# Patient Record
Sex: MALE | Race: BLACK OR AFRICAN AMERICAN | Employment: OTHER | URBAN - NONMETROPOLITAN AREA
[De-identification: names, ages, dates, MRNs, and addresses within clinical notes are randomized per-mention and may not be internally consistent; named-entity substitution may affect disease eponyms.]

---

## 2018-06-12 ENCOUNTER — HOSPITAL ENCOUNTER (INPATIENT)
Age: 63
LOS: 7 days | Discharge: NURSING, REHAB OR LONG-TERM CARE | DRG: 071 | End: 2018-06-19
Attending: FAMILY MEDICINE | Admitting: INTERNAL MEDICINE
Payer: COMMERCIAL

## 2018-06-12 ENCOUNTER — APPOINTMENT (OUTPATIENT)
Dept: CT IMAGING | Age: 63
DRG: 071 | End: 2018-06-12
Payer: COMMERCIAL

## 2018-06-12 ENCOUNTER — APPOINTMENT (OUTPATIENT)
Dept: ULTRASOUND IMAGING | Age: 63
DRG: 071 | End: 2018-06-12
Payer: COMMERCIAL

## 2018-06-12 ENCOUNTER — APPOINTMENT (OUTPATIENT)
Dept: GENERAL RADIOLOGY | Age: 63
DRG: 071 | End: 2018-06-12
Payer: COMMERCIAL

## 2018-06-12 DIAGNOSIS — E87.6 HYPOKALEMIA: ICD-10-CM

## 2018-06-12 DIAGNOSIS — E87.1 HYPONATREMIA: ICD-10-CM

## 2018-06-12 DIAGNOSIS — R41.82 ALTERED MENTAL STATUS, UNSPECIFIED ALTERED MENTAL STATUS TYPE: Primary | ICD-10-CM

## 2018-06-12 DIAGNOSIS — Q04.0: ICD-10-CM

## 2018-06-12 DIAGNOSIS — R53.81 PHYSICAL DEBILITY: ICD-10-CM

## 2018-06-12 DIAGNOSIS — E83.42 HYPOMAGNESEMIA: ICD-10-CM

## 2018-06-12 DIAGNOSIS — E46 MALNUTRITION, UNSPECIFIED TYPE (HCC): ICD-10-CM

## 2018-06-12 PROBLEM — G93.40 ENCEPHALOPATHY ACUTE: Status: ACTIVE | Noted: 2018-06-12

## 2018-06-12 LAB
ADENOVIRUS F 40 41 PCR: NOT DETECTED
ALBUMIN SERPL-MCNC: 3.6 G/DL (ref 3.5–5.1)
ALP BLD-CCNC: 49 U/L (ref 38–126)
ALT SERPL-CCNC: 61 U/L (ref 11–66)
AMMONIA: 31 UMOL/L (ref 11–60)
AMPHETAMINE+METHAMPHETAMINE URINE SCREEN: NEGATIVE
ANION GAP SERPL CALCULATED.3IONS-SCNC: 15 MEQ/L (ref 8–16)
ANION GAP SERPL CALCULATED.3IONS-SCNC: 17 MEQ/L (ref 8–16)
AST SERPL-CCNC: 134 U/L (ref 5–40)
ASTROVIRUS PCR: NOT DETECTED
BARBITURATE QUANTITATIVE URINE: NEGATIVE
BASOPHILIA: SLIGHT
BASOPHILS # BLD: 0.9 %
BASOPHILS ABSOLUTE: 0 THOU/MM3 (ref 0–0.1)
BENZODIAZEPINE QUANTITATIVE URINE: NEGATIVE
BILIRUB SERPL-MCNC: 1.3 MG/DL (ref 0.3–1.2)
BILIRUBIN DIRECT: 0.6 MG/DL (ref 0–0.3)
BILIRUBIN URINE: NEGATIVE
BLOOD, URINE: NEGATIVE
BUN BLDV-MCNC: 5 MG/DL (ref 7–22)
BUN BLDV-MCNC: 5 MG/DL (ref 7–22)
CALCIUM SERPL-MCNC: 8.7 MG/DL (ref 8.5–10.5)
CALCIUM SERPL-MCNC: 8.7 MG/DL (ref 8.5–10.5)
CAMPYLOBACTER PCR: NOT DETECTED
CANNABINOID QUANTITATIVE URINE: NEGATIVE
CHARACTER, URINE: CLEAR
CHLORIDE BLD-SCNC: 88 MEQ/L (ref 98–111)
CHLORIDE BLD-SCNC: 90 MEQ/L (ref 98–111)
CLOSTRIDIUM DIFFICILE, PCR: NOT DETECTED
CO2: 21 MEQ/L (ref 23–33)
CO2: 23 MEQ/L (ref 23–33)
COCAINE METABOLITE QUANTITATIVE URINE: POSITIVE
COLOR: YELLOW
CREAT SERPL-MCNC: 0.7 MG/DL (ref 0.4–1.2)
CREAT SERPL-MCNC: 0.7 MG/DL (ref 0.4–1.2)
CRYPTOSPORIDIUM PCR: NOT DETECTED
CYCLOSPORA CAYETANENSIS PCR: NOT DETECTED
DIFFERENTIAL TYPE: ABNORMAL
E COLI 0157 PCR: NORMAL
E COLI ENTEROAGGREGATIVE PCR: NOT DETECTED
E COLI ENTEROPATHOGENIC PCR: NOT DETECTED
E COLI ENTEROTOXIGENIC PCR: NOT DETECTED
E COLI SHIGA LIKE TOXIN PCR: NOT DETECTED
E COLI SHIGELLA/ENTEROINVASIVE PCR: NOT DETECTED
E HISTOLYTICA GI FILM ARRAY: NOT DETECTED
EKG ATRIAL RATE: 91 BPM
EKG P AXIS: 84 DEGREES
EKG P-R INTERVAL: 140 MS
EKG Q-T INTERVAL: 350 MS
EKG QRS DURATION: 84 MS
EKG QTC CALCULATION (BAZETT): 430 MS
EKG R AXIS: 67 DEGREES
EKG T AXIS: 64 DEGREES
EKG VENTRICULAR RATE: 91 BPM
EOSINOPHIL # BLD: 1.2 %
EOSINOPHILS ABSOLUTE: 0 THOU/MM3 (ref 0–0.4)
ETHYL ALCOHOL, SERUM: < 0.01 %
FERRITIN: 1973 NG/ML (ref 22–322)
FOLATE: > 20 NG/ML (ref 4.8–24.2)
GFR SERPL CREATININE-BSD FRML MDRD: > 90 ML/MIN/1.73M2
GFR SERPL CREATININE-BSD FRML MDRD: > 90 ML/MIN/1.73M2
GIARDIA LAMBLIA PCR: NOT DETECTED
GLUCOSE BLD-MCNC: 100 MG/DL (ref 70–108)
GLUCOSE BLD-MCNC: 138 MG/DL (ref 70–108)
GLUCOSE URINE: NEGATIVE MG/DL
HCT VFR BLD CALC: 30.2 % (ref 42–52)
HEMOGLOBIN: 10.3 GM/DL (ref 14–18)
HEPATITIS B CORE IGM ANTIBODY: NEGATIVE
HEPATITIS B SURFACE ANTIGEN: NEGATIVE
HEPATITIS C ANTIBODY: ABNORMAL
INR BLD: 1.19 (ref 0.85–1.13)
IRON: 121 UG/DL (ref 65–195)
KETONES, URINE: NEGATIVE
LEUKOCYTE ESTERASE, URINE: NEGATIVE
LIPASE: 75.7 U/L (ref 5.6–51.3)
LYMPHOCYTES # BLD: 48.8 %
LYMPHOCYTES ABSOLUTE: 1.2 THOU/MM3 (ref 1–4.8)
MACROCYTES: ABNORMAL
MAGNESIUM: 1.3 MG/DL (ref 1.6–2.4)
MCH RBC QN AUTO: 35.7 PG (ref 27–31)
MCHC RBC AUTO-ENTMCNC: 34 GM/DL (ref 33–37)
MCV RBC AUTO: 105.1 FL (ref 80–94)
MONOCYTES # BLD: 15.3 %
MONOCYTES ABSOLUTE: 0.4 THOU/MM3 (ref 0.4–1.3)
NITRITE, URINE: NEGATIVE
NOROVIRUS GI GII PCR: NOT DETECTED
NUCLEATED RED BLOOD CELLS: 1 /100 WBC
OPIATES, URINE: NEGATIVE
OSMOLALITY CALCULATION: 250.7 MOSMOL/KG (ref 275–300)
OSMOLALITY CALCULATION: 256.5 MOSMOL/KG (ref 275–300)
OVALOCYTES: ABNORMAL
OXYCODONE: NEGATIVE
PATHOLOGIST REVIEW: ABNORMAL
PDW BLD-RTO: 13.5 % (ref 11.5–14.5)
PH UA: 7
PHENCYCLIDINE QUANTITATIVE URINE: NEGATIVE
PLATELET # BLD: 59 THOU/MM3 (ref 130–400)
PLATELET ESTIMATE: ABNORMAL
PLESIOMONAS SHIGELLOIDES PCR: NOT DETECTED
PMV BLD AUTO: 11.3 FL (ref 7.4–10.4)
POIKILOCYTES: SLIGHT
POTASSIUM SERPL-SCNC: 3.3 MEQ/L (ref 3.5–5.2)
POTASSIUM SERPL-SCNC: 4.1 MEQ/L (ref 3.5–5.2)
PRO-BNP: 250.9 PG/ML (ref 0–900)
PROCALCITONIN: 0.14 NG/ML (ref 0.01–0.09)
PROTEIN UA: NEGATIVE
RBC # BLD: 2.87 MILL/MM3 (ref 4.7–6.1)
ROTAVIRUS A PCR: NOT DETECTED
SALMONELLA PCR: NOT DETECTED
SAPOVIRUS PCR: NOT DETECTED
SCAN OF BLOOD SMEAR: NORMAL
SEG NEUTROPHILS: 33.8 %
SEGMENTED NEUTROPHILS ABSOLUTE COUNT: 0.8 THOU/MM3 (ref 1.8–7.7)
SODIUM BLD-SCNC: 126 MEQ/L (ref 135–145)
SODIUM BLD-SCNC: 128 MEQ/L (ref 135–145)
SPECIFIC GRAVITY, URINE: 1.01 (ref 1–1.03)
TOTAL PROTEIN: 7.5 G/DL (ref 6.1–8)
TROPONIN T: < 0.01 NG/ML
TROPONIN T: < 0.01 NG/ML
TSH SERPL DL<=0.05 MIU/L-ACNC: 1.38 UIU/ML (ref 0.4–4.2)
UROBILINOGEN, URINE: 1 EU/DL
VIBRIO CHOLERAE PCR: NOT DETECTED
VIBRIO PCR: NOT DETECTED
VITAMIN B-12: 1335 PG/ML (ref 211–911)
WBC # BLD: 2.4 THOU/MM3 (ref 4.8–10.8)
YERSINIA ENTEROCOLITICA PCR: NOT DETECTED

## 2018-06-12 PROCEDURE — 76705 ECHO EXAM OF ABDOMEN: CPT

## 2018-06-12 PROCEDURE — 83735 ASSAY OF MAGNESIUM: CPT

## 2018-06-12 PROCEDURE — 82607 VITAMIN B-12: CPT

## 2018-06-12 PROCEDURE — 2580000003 HC RX 258: Performed by: INTERNAL MEDICINE

## 2018-06-12 PROCEDURE — C9113 INJ PANTOPRAZOLE SODIUM, VIA: HCPCS | Performed by: INTERNAL MEDICINE

## 2018-06-12 PROCEDURE — 87389 HIV-1 AG W/HIV-1&-2 AB AG IA: CPT

## 2018-06-12 PROCEDURE — 82248 BILIRUBIN DIRECT: CPT

## 2018-06-12 PROCEDURE — 82140 ASSAY OF AMMONIA: CPT

## 2018-06-12 PROCEDURE — 87507 IADNA-DNA/RNA PROBE TQ 12-25: CPT

## 2018-06-12 PROCEDURE — 70450 CT HEAD/BRAIN W/O DYE: CPT

## 2018-06-12 PROCEDURE — 71046 X-RAY EXAM CHEST 2 VIEWS: CPT

## 2018-06-12 PROCEDURE — 81003 URINALYSIS AUTO W/O SCOPE: CPT

## 2018-06-12 PROCEDURE — 84443 ASSAY THYROID STIM HORMONE: CPT

## 2018-06-12 PROCEDURE — 99223 1ST HOSP IP/OBS HIGH 75: CPT | Performed by: INTERNAL MEDICINE

## 2018-06-12 PROCEDURE — 93010 ELECTROCARDIOGRAM REPORT: CPT | Performed by: INTERNAL MEDICINE

## 2018-06-12 PROCEDURE — 36415 COLL VENOUS BLD VENIPUNCTURE: CPT

## 2018-06-12 PROCEDURE — 86592 SYPHILIS TEST NON-TREP QUAL: CPT

## 2018-06-12 PROCEDURE — 93005 ELECTROCARDIOGRAM TRACING: CPT | Performed by: FAMILY MEDICINE

## 2018-06-12 PROCEDURE — 82746 ASSAY OF FOLIC ACID SERUM: CPT

## 2018-06-12 PROCEDURE — 86705 HEP B CORE ANTIBODY IGM: CPT

## 2018-06-12 PROCEDURE — 80048 BASIC METABOLIC PNL TOTAL CA: CPT

## 2018-06-12 PROCEDURE — 99285 EMERGENCY DEPT VISIT HI MDM: CPT

## 2018-06-12 PROCEDURE — 80307 DRUG TEST PRSMV CHEM ANLYZR: CPT

## 2018-06-12 PROCEDURE — 96365 THER/PROPH/DIAG IV INF INIT: CPT

## 2018-06-12 PROCEDURE — 87340 HEPATITIS B SURFACE AG IA: CPT

## 2018-06-12 PROCEDURE — 6360000002 HC RX W HCPCS: Performed by: INTERNAL MEDICINE

## 2018-06-12 PROCEDURE — 6360000002 HC RX W HCPCS: Performed by: FAMILY MEDICINE

## 2018-06-12 PROCEDURE — 1200000003 HC TELEMETRY R&B

## 2018-06-12 PROCEDURE — 83690 ASSAY OF LIPASE: CPT

## 2018-06-12 PROCEDURE — 87522 HEPATITIS C REVRS TRNSCRPJ: CPT

## 2018-06-12 PROCEDURE — 85610 PROTHROMBIN TIME: CPT

## 2018-06-12 PROCEDURE — 84484 ASSAY OF TROPONIN QUANT: CPT

## 2018-06-12 PROCEDURE — 6370000000 HC RX 637 (ALT 250 FOR IP): Performed by: FAMILY MEDICINE

## 2018-06-12 PROCEDURE — 2580000003 HC RX 258: Performed by: FAMILY MEDICINE

## 2018-06-12 PROCEDURE — 80053 COMPREHEN METABOLIC PANEL: CPT

## 2018-06-12 PROCEDURE — 84145 PROCALCITONIN (PCT): CPT

## 2018-06-12 PROCEDURE — 83880 ASSAY OF NATRIURETIC PEPTIDE: CPT

## 2018-06-12 PROCEDURE — G0480 DRUG TEST DEF 1-7 CLASSES: HCPCS

## 2018-06-12 PROCEDURE — 84466 ASSAY OF TRANSFERRIN: CPT

## 2018-06-12 PROCEDURE — 82728 ASSAY OF FERRITIN: CPT

## 2018-06-12 PROCEDURE — 83540 ASSAY OF IRON: CPT

## 2018-06-12 PROCEDURE — 85025 COMPLETE CBC W/AUTO DIFF WBC: CPT

## 2018-06-12 PROCEDURE — 86803 HEPATITIS C AB TEST: CPT

## 2018-06-12 RX ORDER — POTASSIUM CHLORIDE 20 MEQ/1
20 TABLET, EXTENDED RELEASE ORAL ONCE
Status: COMPLETED | OUTPATIENT
Start: 2018-06-12 | End: 2018-06-12

## 2018-06-12 RX ORDER — AMLODIPINE BESYLATE 10 MG/1
10 TABLET ORAL DAILY
Status: ON HOLD | COMMUNITY
End: 2018-06-19 | Stop reason: HOSPADM

## 2018-06-12 RX ORDER — PANTOPRAZOLE SODIUM 40 MG/10ML
40 INJECTION, POWDER, LYOPHILIZED, FOR SOLUTION INTRAVENOUS DAILY
Status: DISCONTINUED | OUTPATIENT
Start: 2018-06-12 | End: 2018-06-13

## 2018-06-12 RX ORDER — FOLIC ACID 1 MG/1
1 TABLET ORAL DAILY
Status: DISCONTINUED | OUTPATIENT
Start: 2018-06-12 | End: 2018-06-19 | Stop reason: HOSPADM

## 2018-06-12 RX ORDER — MAGNESIUM SULFATE IN WATER 40 MG/ML
2 INJECTION, SOLUTION INTRAVENOUS ONCE
Status: COMPLETED | OUTPATIENT
Start: 2018-06-12 | End: 2018-06-12

## 2018-06-12 RX ORDER — THIAMINE MONONITRATE (VIT B1) 100 MG
100 TABLET ORAL ONCE
Status: COMPLETED | OUTPATIENT
Start: 2018-06-12 | End: 2018-06-12

## 2018-06-12 RX ORDER — THIAMINE MONONITRATE (VIT B1) 100 MG
100 TABLET ORAL DAILY
Status: DISCONTINUED | OUTPATIENT
Start: 2018-06-13 | End: 2018-06-19 | Stop reason: HOSPADM

## 2018-06-12 RX ORDER — THIAMINE MONONITRATE (VIT B1) 100 MG
100 TABLET ORAL DAILY
Status: DISCONTINUED | OUTPATIENT
Start: 2018-06-12 | End: 2018-06-12

## 2018-06-12 RX ORDER — SODIUM CHLORIDE 9 MG/ML
INJECTION, SOLUTION INTRAVENOUS CONTINUOUS
Status: DISCONTINUED | OUTPATIENT
Start: 2018-06-12 | End: 2018-06-13

## 2018-06-12 RX ORDER — 0.9 % SODIUM CHLORIDE 0.9 %
1000 INTRAVENOUS SOLUTION INTRAVENOUS ONCE
Status: COMPLETED | OUTPATIENT
Start: 2018-06-12 | End: 2018-06-12

## 2018-06-12 RX ADMIN — PANTOPRAZOLE SODIUM 40 MG: 40 INJECTION, POWDER, FOR SOLUTION INTRAVENOUS at 20:03

## 2018-06-12 RX ADMIN — MAGNESIUM SULFATE HEPTAHYDRATE 2 G: 40 INJECTION, SOLUTION INTRAVENOUS at 14:21

## 2018-06-12 RX ADMIN — SODIUM CHLORIDE: 9 INJECTION, SOLUTION INTRAVENOUS at 16:48

## 2018-06-12 RX ADMIN — FOLIC ACID 1 MG: 1 TABLET ORAL at 11:17

## 2018-06-12 RX ADMIN — Medication 100 MG: at 11:17

## 2018-06-12 RX ADMIN — SODIUM CHLORIDE 1000 ML: 9 INJECTION, SOLUTION INTRAVENOUS at 11:11

## 2018-06-12 RX ADMIN — POTASSIUM CHLORIDE 20 MEQ: 1500 TABLET, EXTENDED RELEASE ORAL at 14:21

## 2018-06-12 ASSESSMENT — PAIN SCALES - GENERAL
PAINLEVEL_OUTOF10: 8
PAINLEVEL_OUTOF10: 10

## 2018-06-12 ASSESSMENT — PAIN DESCRIPTION - ORIENTATION
ORIENTATION: LEFT
ORIENTATION: LEFT

## 2018-06-12 ASSESSMENT — ENCOUNTER SYMPTOMS
WHEEZING: 0
NAUSEA: 0
DIARRHEA: 1
SORE THROAT: 0
RHINORRHEA: 0
EYE REDNESS: 0
EYE DISCHARGE: 0
SHORTNESS OF BREATH: 0
VOMITING: 0
ABDOMINAL PAIN: 0
BACK PAIN: 0
COUGH: 0

## 2018-06-12 ASSESSMENT — PAIN DESCRIPTION - DESCRIPTORS
DESCRIPTORS: ACHING
DESCRIPTORS: ACHING

## 2018-06-12 ASSESSMENT — PAIN DESCRIPTION - LOCATION
LOCATION: SHOULDER
LOCATION: SHOULDER

## 2018-06-12 ASSESSMENT — PAIN DESCRIPTION - PAIN TYPE
TYPE: CHRONIC PAIN
TYPE: CHRONIC PAIN

## 2018-06-12 ASSESSMENT — PAIN DESCRIPTION - FREQUENCY
FREQUENCY: CONTINUOUS
FREQUENCY: CONTINUOUS

## 2018-06-12 NOTE — ED NOTES
Ambulated with pt to restroom for stool sample. Pt was unable to provide one at this time.       Damián Peralta RN  06/12/18 9778

## 2018-06-12 NOTE — H&P
Conjunctivae/corneas clear. Neck: Supple, with full range of motion. No jugular venous distention. Trachea midline. Respiratory:  Normal respiratory effort. Clear to auscultation, bilaterally without Rales/Wheezes/Rhonchi. Cardiovascular:  Regular rate and rhythm with normal S1/S2 without murmurs, rubs or gallops. Abdomen: Soft, non-tender, non-distended with normal bowel sounds. Musculoskeletal:  No clubbing, cyanosis or edema bilaterally. Full range of motion without deformity. Skin: Skin color, texture, turgor normal.  No rashes or lesions. Neurologic:  Neurovascularly intact without any focal sensory/motor deficits. Cranial nerves: II-XII intact, grossly non-focal.  Psychiatric:  Alert and oriented, thought content appropriate, normal insight  Capillary Refill: Brisk,< 3 seconds   Peripheral Pulses: +2 palpable, equal bilaterally       Labs:     Recent Labs      06/12/18   1055   WBC  2.4*   HGB  10.3*   HCT  30.2*   PLT  59*     Recent Labs      06/12/18   1055   NA  126*   K  3.3*   CL  88*   CO2  21*   BUN  5*   CREATININE  0.7   CALCIUM  8.7     Recent Labs      06/12/18   1055   AST  134*   ALT  61   BILIDIR  0.6*   BILITOT  1.3*   ALKPHOS  49     No results for input(s): INR in the last 72 hours. No results for input(s): Penelope Luis in the last 72 hours. Urinalysis:    No results found for: Nadia Nazario, BACTERIA, [facility], [address], [facility]    Radiology:     CXR: I have reviewed the CXR with the following interpretation:  Normal   EKG:  I have reviewed the EKG with the following interpretation:  Sinus duarte    CT HEAD WO CONTRAST   Final Result   1. No mass effect or acute hemorrhage. 2. Chronic periventricular small vessel ischemic changes and cerebral atrophy. **This report has been created using voice recognition software. It may contain minor errors which are inherent in voice recognition technology. **      Final report electronically signed by Dr. Jennings Fitting

## 2018-06-12 NOTE — ED PROVIDER NOTES
pain, joint swelling, myalgias and neck pain. Skin: Negative for pallor and rash. Allergic/Immunologic: Negative for environmental allergies. Neurological: Negative for dizziness, syncope, weakness, light-headedness and headaches. Hematological: Negative for adenopathy. Psychiatric/Behavioral: Positive for confusion. Negative for agitation, dysphoric mood and suicidal ideas. The patient is not nervous/anxious. PAST MEDICAL HISTORY    has a past medical history of Asthma; Cerebral artery occlusion with cerebral infarction ([facility] [address] Chronic kidney disease; Diarrhea; Hemodialysis patient ([facility] [address] and Hypertension. SURGICAL HISTORY      has a past surgical history that includes Colonoscopy. CURRENT MEDICATIONS       Current Discharge Medication List      CONTINUE these medications which have NOT CHANGED    Details   amLODIPine (NORVASC) 10 MG tablet Take 10 mg by mouth daily             ALLERGIES     is allergic to sulfa antibiotics. FAMILY HISTORY     indicated that his mother is alive. He indicated that his father is . He indicated that only one of his two brothers is alive. family history includes Cancer in his brother; Heart Disease in his brother and father; Other in his mother. SOCIAL HISTORY      reports that he has been smoking. He has a 22.00 pack-year smoking history. He has never used smokeless tobacco. He reports that he drinks about 2.4 oz of alcohol per week . He reports that he does not use drugs. PHYSICAL EXAM     INITIAL VITALS:  height is 5' 8\" (1.727 m) and weight is 122 lb 12.8 oz (55.7 kg). His oral temperature is 98.4 °F (36.9 °C). His blood pressure is 128/77 and his pulse is 72. His respiration is 18 and oxygen saturation is 99%. Physical Exam   Constitutional: He is oriented to person, place, and time. He appears well-developed and well-nourished. HENT:   Head: Normocephalic and atraumatic.    Right Ear: External ear normal.   Left Ear: External ear impression of normal sinus rhythm. RADIOLOGY: non-plain film images(s) such as CT, Ultrasound and MRI are read by the radiologist.    US LIVER   Final Result   Liver ultrasound was essentially normal. Incidental note is made of cholelithiasis and gallbladder sludge. **This report has been created using voice recognition software. It may contain minor errors which are inherent in voice recognition technology. **      Final report electronically signed by Dr. Wendi Sanchez on 6/12/2018 4:03 PM      CT HEAD WO CONTRAST   Final Result   1. No mass effect or acute hemorrhage. 2. Chronic periventricular small vessel ischemic changes and cerebral atrophy. **This report has been created using voice recognition software. It may contain minor errors which are inherent in voice recognition technology. **      Final report electronically signed by Dr. Belén Ferreira on 6/12/2018 11:15 AM      XR CHEST STANDARD (2 VW)   Final Result      No acute intrathoracic process. **This report has been created using voice recognition software. It may contain minor errors which are inherent in voice recognition technology. **      Final report electronically signed by Dr. Belén Ferreira on 6/12/2018 11:10 AM          LABS:   Labs Reviewed   CBC WITH AUTO DIFFERENTIAL - Abnormal; Notable for the following:        Result Value    WBC 2.4 (*)     RBC 2.87 (*)     Hemoglobin 10.3 (*)     Hematocrit 30.2 (*)     .1 (*)     MCH 35.7 (*)     Platelets 59 (*)     MPV 11.3 (*)     Segs Absolute 0.8 (*)     All other components within normal limits   BASIC METABOLIC PANEL - Abnormal; Notable for the following:     Sodium 126 (*)     Potassium 3.3 (*)     Chloride 88 (*)     CO2 21 (*)     BUN 5 (*)     All other components within normal limits   HEPATIC FUNCTION PANEL - Abnormal; Notable for the following:      Total Bilirubin 1.3 (*)     Bilirubin, Direct 0.6 (*)      (*)     All other components

## 2018-06-13 ENCOUNTER — APPOINTMENT (OUTPATIENT)
Dept: MRI IMAGING | Age: 63
DRG: 071 | End: 2018-06-13
Payer: COMMERCIAL

## 2018-06-13 ENCOUNTER — APPOINTMENT (OUTPATIENT)
Dept: GENERAL RADIOLOGY | Age: 63
DRG: 071 | End: 2018-06-13
Payer: COMMERCIAL

## 2018-06-13 PROBLEM — R25.1 TREMOR DUE TO SUBSTANCE ABUSE: Status: ACTIVE | Noted: 2018-06-13

## 2018-06-13 PROBLEM — G93.41 METABOLIC ENCEPHALOPATHY: Status: ACTIVE | Noted: 2018-06-13

## 2018-06-13 LAB
ANION GAP SERPL CALCULATED.3IONS-SCNC: 13 MEQ/L (ref 8–16)
ANION GAP SERPL CALCULATED.3IONS-SCNC: 14 MEQ/L (ref 8–16)
ANION GAP SERPL CALCULATED.3IONS-SCNC: 15 MEQ/L (ref 8–16)
ANION GAP SERPL CALCULATED.3IONS-SCNC: 16 MEQ/L (ref 8–16)
BUN BLDV-MCNC: 6 MG/DL (ref 7–22)
BUN BLDV-MCNC: 6 MG/DL (ref 7–22)
BUN BLDV-MCNC: 7 MG/DL (ref 7–22)
BUN BLDV-MCNC: 7 MG/DL (ref 7–22)
CALCIUM SERPL-MCNC: 8.3 MG/DL (ref 8.5–10.5)
CALCIUM SERPL-MCNC: 8.4 MG/DL (ref 8.5–10.5)
CALCIUM SERPL-MCNC: 8.4 MG/DL (ref 8.5–10.5)
CALCIUM SERPL-MCNC: 8.6 MG/DL (ref 8.5–10.5)
CHLORIDE BLD-SCNC: 96 MEQ/L (ref 98–111)
CHLORIDE BLD-SCNC: 96 MEQ/L (ref 98–111)
CHLORIDE BLD-SCNC: 97 MEQ/L (ref 98–111)
CHLORIDE BLD-SCNC: 99 MEQ/L (ref 98–111)
CHOLESTEROL, TOTAL: 91 MG/DL (ref 100–199)
CO2: 20 MEQ/L (ref 23–33)
CO2: 20 MEQ/L (ref 23–33)
CO2: 23 MEQ/L (ref 23–33)
CO2: 24 MEQ/L (ref 23–33)
CORTISOL COLLECTION INFO: NORMAL
CORTISOL: 6.23 UG/DL
CREAT SERPL-MCNC: 0.7 MG/DL (ref 0.4–1.2)
CREAT SERPL-MCNC: 0.8 MG/DL (ref 0.4–1.2)
GFR SERPL CREATININE-BSD FRML MDRD: > 90 ML/MIN/1.73M2
GLUCOSE BLD-MCNC: 124 MG/DL (ref 70–108)
GLUCOSE BLD-MCNC: 84 MG/DL (ref 70–108)
GLUCOSE BLD-MCNC: 93 MG/DL (ref 70–108)
GLUCOSE BLD-MCNC: 94 MG/DL (ref 70–108)
HCT VFR BLD CALC: 30 % (ref 42–52)
HDLC SERPL-MCNC: 33 MG/DL
HEMOGLOBIN: 10 GM/DL (ref 14–18)
LDL CHOLESTEROL CALCULATED: 45 MG/DL
LV EF: 38 %
LVEF MODALITY: NORMAL
MAGNESIUM: 1.6 MG/DL (ref 1.6–2.4)
MCH RBC QN AUTO: 35.3 PG (ref 27–31)
MCHC RBC AUTO-ENTMCNC: 33.5 GM/DL (ref 33–37)
MCV RBC AUTO: 105.3 FL (ref 80–94)
OSMOLALITY CALCULATION: 264.1 MOSMOL/KG (ref 275–300)
PDW BLD-RTO: 14.2 % (ref 11.5–14.5)
PLATELET # BLD: 69 THOU/MM3 (ref 130–400)
PMV BLD AUTO: 11.1 FL (ref 7.4–10.4)
POTASSIUM SERPL-SCNC: 3.5 MEQ/L (ref 3.5–5.2)
POTASSIUM SERPL-SCNC: 3.5 MEQ/L (ref 3.5–5.2)
POTASSIUM SERPL-SCNC: 3.9 MEQ/L (ref 3.5–5.2)
POTASSIUM SERPL-SCNC: 4.3 MEQ/L (ref 3.5–5.2)
RBC # BLD: 2.85 MILL/MM3 (ref 4.7–6.1)
RPR: NONREACTIVE
SODIUM BLD-SCNC: 132 MEQ/L (ref 135–145)
SODIUM BLD-SCNC: 133 MEQ/L (ref 135–145)
SODIUM BLD-SCNC: 134 MEQ/L (ref 135–145)
SODIUM BLD-SCNC: 134 MEQ/L (ref 135–145)
TRIGL SERPL-MCNC: 64 MG/DL (ref 0–199)
TROPONIN T: < 0.01 NG/ML
VITAMIN B-12: 1259 PG/ML (ref 211–911)
VITAMIN D 25-HYDROXY: 39 NG/ML (ref 30–100)
WBC # BLD: 2.5 THOU/MM3 (ref 4.8–10.8)

## 2018-06-13 PROCEDURE — 73630 X-RAY EXAM OF FOOT: CPT

## 2018-06-13 PROCEDURE — 99232 SBSQ HOSP IP/OBS MODERATE 35: CPT | Performed by: INTERNAL MEDICINE

## 2018-06-13 PROCEDURE — 93306 TTE W/DOPPLER COMPLETE: CPT

## 2018-06-13 PROCEDURE — 82533 TOTAL CORTISOL: CPT

## 2018-06-13 PROCEDURE — 83735 ASSAY OF MAGNESIUM: CPT

## 2018-06-13 PROCEDURE — 1200000003 HC TELEMETRY R&B

## 2018-06-13 PROCEDURE — 6360000002 HC RX W HCPCS: Performed by: FAMILY MEDICINE

## 2018-06-13 PROCEDURE — 6370000000 HC RX 637 (ALT 250 FOR IP): Performed by: INTERNAL MEDICINE

## 2018-06-13 PROCEDURE — 99222 1ST HOSP IP/OBS MODERATE 55: CPT | Performed by: PSYCHIATRY & NEUROLOGY

## 2018-06-13 PROCEDURE — 97161 PT EVAL LOW COMPLEX 20 MIN: CPT

## 2018-06-13 PROCEDURE — 80061 LIPID PANEL: CPT

## 2018-06-13 PROCEDURE — 95819 EEG AWAKE AND ASLEEP: CPT

## 2018-06-13 PROCEDURE — 84484 ASSAY OF TROPONIN QUANT: CPT

## 2018-06-13 PROCEDURE — 82306 VITAMIN D 25 HYDROXY: CPT

## 2018-06-13 PROCEDURE — G8979 MOBILITY GOAL STATUS: HCPCS

## 2018-06-13 PROCEDURE — 6360000002 HC RX W HCPCS: Performed by: INTERNAL MEDICINE

## 2018-06-13 PROCEDURE — 82607 VITAMIN B-12: CPT

## 2018-06-13 PROCEDURE — 2580000003 HC RX 258: Performed by: INTERNAL MEDICINE

## 2018-06-13 PROCEDURE — 80048 BASIC METABOLIC PNL TOTAL CA: CPT

## 2018-06-13 PROCEDURE — 86592 SYPHILIS TEST NON-TREP QUAL: CPT

## 2018-06-13 PROCEDURE — G8978 MOBILITY CURRENT STATUS: HCPCS

## 2018-06-13 PROCEDURE — 36415 COLL VENOUS BLD VENIPUNCTURE: CPT

## 2018-06-13 PROCEDURE — 85027 COMPLETE CBC AUTOMATED: CPT

## 2018-06-13 PROCEDURE — 97110 THERAPEUTIC EXERCISES: CPT

## 2018-06-13 PROCEDURE — 6370000000 HC RX 637 (ALT 250 FOR IP): Performed by: FAMILY MEDICINE

## 2018-06-13 RX ORDER — MAGNESIUM SULFATE IN WATER 40 MG/ML
2 INJECTION, SOLUTION INTRAVENOUS ONCE
Status: COMPLETED | OUTPATIENT
Start: 2018-06-13 | End: 2018-06-13

## 2018-06-13 RX ORDER — LORAZEPAM 2 MG/1
4 TABLET ORAL
Status: DISCONTINUED | OUTPATIENT
Start: 2018-06-13 | End: 2018-06-19 | Stop reason: HOSPADM

## 2018-06-13 RX ORDER — LORAZEPAM 2 MG/ML
3 INJECTION INTRAMUSCULAR
Status: DISCONTINUED | OUTPATIENT
Start: 2018-06-13 | End: 2018-06-19 | Stop reason: HOSPADM

## 2018-06-13 RX ORDER — SODIUM CHLORIDE AND POTASSIUM CHLORIDE .9; .15 G/100ML; G/100ML
SOLUTION INTRAVENOUS CONTINUOUS
Status: DISPENSED | OUTPATIENT
Start: 2018-06-13 | End: 2018-06-14

## 2018-06-13 RX ORDER — LORAZEPAM 1 MG/1
1 TABLET ORAL
Status: DISCONTINUED | OUTPATIENT
Start: 2018-06-13 | End: 2018-06-19 | Stop reason: HOSPADM

## 2018-06-13 RX ORDER — POTASSIUM CHLORIDE 20 MEQ/1
40 TABLET, EXTENDED RELEASE ORAL 2 TIMES DAILY WITH MEALS
Status: COMPLETED | OUTPATIENT
Start: 2018-06-13 | End: 2018-06-13

## 2018-06-13 RX ORDER — LORAZEPAM 2 MG/ML
4 INJECTION INTRAMUSCULAR
Status: DISCONTINUED | OUTPATIENT
Start: 2018-06-13 | End: 2018-06-19 | Stop reason: HOSPADM

## 2018-06-13 RX ORDER — MULTIVITAMIN WITH FOLIC ACID 400 MCG
1 TABLET ORAL DAILY
Status: DISCONTINUED | OUTPATIENT
Start: 2018-06-13 | End: 2018-06-19 | Stop reason: HOSPADM

## 2018-06-13 RX ORDER — LORAZEPAM 2 MG/1
2 TABLET ORAL
Status: DISCONTINUED | OUTPATIENT
Start: 2018-06-13 | End: 2018-06-19 | Stop reason: HOSPADM

## 2018-06-13 RX ORDER — LORAZEPAM 2 MG/ML
1 INJECTION INTRAMUSCULAR
Status: DISCONTINUED | OUTPATIENT
Start: 2018-06-13 | End: 2018-06-19 | Stop reason: HOSPADM

## 2018-06-13 RX ORDER — LORAZEPAM 2 MG/ML
2 INJECTION INTRAMUSCULAR
Status: DISCONTINUED | OUTPATIENT
Start: 2018-06-13 | End: 2018-06-19 | Stop reason: HOSPADM

## 2018-06-13 RX ADMIN — FOLIC ACID 1 MG: 1 TABLET ORAL at 11:34

## 2018-06-13 RX ADMIN — LORAZEPAM 4 MG: 2 INJECTION INTRAMUSCULAR; INTRAVENOUS at 22:37

## 2018-06-13 RX ADMIN — THERA TABS 1 TABLET: TAB at 11:39

## 2018-06-13 RX ADMIN — Medication 100 MG: at 11:34

## 2018-06-13 RX ADMIN — POTASSIUM CHLORIDE AND SODIUM CHLORIDE: 900; 150 INJECTION, SOLUTION INTRAVENOUS at 11:39

## 2018-06-13 RX ADMIN — SODIUM CHLORIDE: 9 INJECTION, SOLUTION INTRAVENOUS at 03:01

## 2018-06-13 RX ADMIN — POTASSIUM CHLORIDE 40 MEQ: 20 TABLET, EXTENDED RELEASE ORAL at 16:59

## 2018-06-13 RX ADMIN — POTASSIUM CHLORIDE 40 MEQ: 20 TABLET, EXTENDED RELEASE ORAL at 11:50

## 2018-06-13 RX ADMIN — MAGNESIUM SULFATE HEPTAHYDRATE 2 G: 40 INJECTION, SOLUTION INTRAVENOUS at 11:41

## 2018-06-13 NOTE — PROGRESS NOTES
[address] [address]  INPATIENT PHYSICAL THERAPY  EVALUATION  [facility] ICU STEPDOWN TELEMETRY 4K - 4K-08/008-A    Time In: 0830  Time Out: 0812  Timed Code Treatment Minutes: 9 Minutes  Minutes: 24          Date: 2018  Patient Name: Justin Johnson,  Gender:  male        MRN: [id number]  : 1955  (57 y.o.)      Referring Practitioner: Kurtis Rao MD  Diagnosis: Encephalopathy acute  Additional Pertinent Hx: Per ED note, pt is a 57 y.o. male who presents to the Emergency Department for the evaluation of altered mental status. Patient reports that he has been falling a lot lately and unable to keep his balance. Patient states to have fallen approximately one week ago when he landed on his left shoulder and reports to be been experiencing since his fall. He denies to have experienced any head trauma, headache, or additional neurological deficit due to his fall. In addition, he reports to have been experiencing diarrhea for the past few days. He denies experiencing nausea, emesis, chest pain, SOB, abdominal pain or urinary complaints. Per brother, patient has not been eating lately. Patient reports that he drinks 4-12 oz. beers a day and smokes 1 pack of cigarettes every two weeks. He denies experiencing alcohol withdraws or seizures from alcohol in the past. He denies any IV drug use. Past Medical History:   Diagnosis Date    Asthma     [facility]bral [facility] with [facility] ([facility] [address]     Chronic kidney disease     Diarrhea     Hemodialysis patient ([facility] [address]     previous    Hypertension      Past Surgical History:   Procedure Laterality Date    COLONOSCOPY         Restrictions/Precautions:  General Precautions, Fall Risk           Subjective:  Chart Reviewed: Yes  Patient assessed for rehabilitation services?: Yes  Subjective: Pt in bed and wants to get to the bathroom.     General:  Overall Orientation Status: Within Functional Limits    Vision: Impaired    Hearing: Within functional Decision Making: Moderate Complexity based on patient assessment and decision making process of determining plan of care and establishing reasonable expectations for measurable functional outcomes    REQUIRES PT FOLLOW UP: Yes  Discharge Recommendations: Continue to assess pending progress, Patient would benefit from continued therapy after discharge    Patient Education:  Patient Education: plan of care    Equipment Recommendations: Other: monitor for needs    Safety:  Type of devices: All fall risk precautions in place, Gait belt, Bed alarm in place, Call light within reach, Left in bed, Patient at risk for falls, Nurse notified    Plan:  Times per week: 5x GM  Times per day: Daily  Specific instructions for Next Treatment: ther ex, ther act, gait trng, balance  Current Treatment Recommendations: Strengthening, Functional Mobility Training, Transfer Training, Balance Training, Endurance Training, Stair training, Gait Training, Safety Education & Training, Patient/Caregiver Education & Training, Equipment Evaluation, Education, & procurement    Goals:  Patient goals : go home  Short term goals  Time Frame for Short term goals: 3 days  Short term goal 1: Pt to be Mod I for supine <> sit to get in/out of bed  Short term goal 2: Pt to be Supervison for sit <> stand to get up to ambulate  Short term goal 3: Pt to ambulate > 50 ft with RW with Supervision for household distances  Short term goal 4: Pt to negotiate 1 step with 1 rail with Supervision for home access  Long term goals  Time Frame for Long term goals : not set due to short ELOS    Evaluation Complexity: Based on the findings of patient history, examination, clinical presentation, and decision making during this evaluation, the evaluation of [employer] is of low complexity. PT G-Codes  Functional Limitation: Mobility: Walking and moving around  Mobility: Walking and Moving Around Current Status ():  At least 40 percent but less than 60

## 2018-06-13 NOTE — CONSULTS
Tremor due to substance abuse  Resolved Problems:    * No resolved hospital problems. *  This is a 43-year-old male with history of polysubstance abuse, urine toxicology was positive for cocaine on admission, with daily alcohol consumption. He presents with confusion, and falls. 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 He experiences dizziness in all positions. The patient will need to undergo workup from a neurologic standpoint:    Recommendations:                                              1. MRI brain without contrast.  2. EEG. 3. Correct metabolic derangements. 4. Delirium tremens precautions. 5. Thiamine. 6. B12, folate. 7. DVT prophylaxis. 8. Physical therapy. 9. Occupational therapy. 10. Case was discussed with primary service. 11. All questions were answered. It was my pleasure to evaluate [facility] today. Please call with questions.       Electronically signed by Colby Meyer MD on 6/13/2018 at 10:50 AM

## 2018-06-14 LAB
AFP-TUMOR MARKER: 9.2 UG/L
ALBUMIN SERPL-MCNC: 3.7 G/DL (ref 3.5–5.1)
ALP BLD-CCNC: 51 U/L (ref 38–126)
ALT SERPL-CCNC: 48 U/L (ref 11–66)
ANION GAP SERPL CALCULATED.3IONS-SCNC: 14 MEQ/L (ref 8–16)
ANION GAP SERPL CALCULATED.3IONS-SCNC: 15 MEQ/L (ref 8–16)
ANISOCYTOSIS: ABNORMAL
AST SERPL-CCNC: 83 U/L (ref 5–40)
BASOPHILS # BLD: 0.2 %
BASOPHILS ABSOLUTE: 0 THOU/MM3 (ref 0–0.1)
BILIRUB SERPL-MCNC: 1.2 MG/DL (ref 0.3–1.2)
BILIRUBIN DIRECT: 0.6 MG/DL (ref 0–0.3)
BUN BLDV-MCNC: 5 MG/DL (ref 7–22)
BUN BLDV-MCNC: 6 MG/DL (ref 7–22)
CALCIUM SERPL-MCNC: 8.5 MG/DL (ref 8.5–10.5)
CALCIUM SERPL-MCNC: 8.7 MG/DL (ref 8.5–10.5)
CHLORIDE BLD-SCNC: 93 MEQ/L (ref 98–111)
CHLORIDE BLD-SCNC: 93 MEQ/L (ref 98–111)
CO2: 21 MEQ/L (ref 23–33)
CO2: 23 MEQ/L (ref 23–33)
CREAT SERPL-MCNC: 0.5 MG/DL (ref 0.4–1.2)
CREAT SERPL-MCNC: 0.6 MG/DL (ref 0.4–1.2)
EOSINOPHIL # BLD: 0.8 %
EOSINOPHILS ABSOLUTE: 0 THOU/MM3 (ref 0–0.4)
GAMMA GLUTAMYL TRANSFERASE: 493 U/L (ref 8–69)
GFR SERPL CREATININE-BSD FRML MDRD: > 90 ML/MIN/1.73M2
GFR SERPL CREATININE-BSD FRML MDRD: > 90 ML/MIN/1.73M2
GLUCOSE BLD-MCNC: 125 MG/DL (ref 70–108)
GLUCOSE BLD-MCNC: 99 MG/DL (ref 70–108)
HCT VFR BLD CALC: 31.2 % (ref 42–52)
HEMOGLOBIN: 10.6 GM/DL (ref 14–18)
INR BLD: 1.2 (ref 0.85–1.13)
LYMPHOCYTES # BLD: 39.7 %
LYMPHOCYTES ABSOLUTE: 1.7 THOU/MM3 (ref 1–4.8)
MACROCYTES: ABNORMAL
MAGNESIUM: 1.5 MG/DL (ref 1.6–2.4)
MCH RBC QN AUTO: 35.2 PG (ref 27–31)
MCHC RBC AUTO-ENTMCNC: 34.1 GM/DL (ref 33–37)
MCV RBC AUTO: 103.2 FL (ref 80–94)
MONOCYTES # BLD: 19.7 %
MONOCYTES ABSOLUTE: 0.8 THOU/MM3 (ref 0.4–1.3)
NUCLEATED RED BLOOD CELLS: 0 /100 WBC
PDW BLD-RTO: 14.6 % (ref 11.5–14.5)
PLATELET # BLD: 70 THOU/MM3 (ref 130–400)
PMV BLD AUTO: 8.9 FL (ref 7.4–10.4)
POTASSIUM SERPL-SCNC: 3.4 MEQ/L (ref 3.5–5.2)
POTASSIUM SERPL-SCNC: 4.1 MEQ/L (ref 3.5–5.2)
RBC # BLD: 3.02 MILL/MM3 (ref 4.7–6.1)
SEG NEUTROPHILS: 39.6 %
SEGMENTED NEUTROPHILS ABSOLUTE COUNT: 1.7 THOU/MM3 (ref 1.8–7.7)
SODIUM BLD-SCNC: 129 MEQ/L (ref 135–145)
SODIUM BLD-SCNC: 130 MEQ/L (ref 135–145)
TOTAL PROTEIN: 7.5 G/DL (ref 6.1–8)
TRANSFERRIN: 191 MG/DL (ref 200–400)
WBC # BLD: 4.2 THOU/MM3 (ref 4.8–10.8)

## 2018-06-14 PROCEDURE — 99233 SBSQ HOSP IP/OBS HIGH 50: CPT | Performed by: INTERNAL MEDICINE

## 2018-06-14 PROCEDURE — 80053 COMPREHEN METABOLIC PANEL: CPT

## 2018-06-14 PROCEDURE — 85025 COMPLETE CBC W/AUTO DIFF WBC: CPT

## 2018-06-14 PROCEDURE — 82977 ASSAY OF GGT: CPT

## 2018-06-14 PROCEDURE — 85610 PROTHROMBIN TIME: CPT

## 2018-06-14 PROCEDURE — 6370000000 HC RX 637 (ALT 250 FOR IP): Performed by: FAMILY MEDICINE

## 2018-06-14 PROCEDURE — 6370000000 HC RX 637 (ALT 250 FOR IP): Performed by: INTERNAL MEDICINE

## 2018-06-14 PROCEDURE — APPSS180 APP SPLIT SHARED TIME > 60 MINUTES: Performed by: PHYSICIAN ASSISTANT

## 2018-06-14 PROCEDURE — 36415 COLL VENOUS BLD VENIPUNCTURE: CPT

## 2018-06-14 PROCEDURE — 82105 ALPHA-FETOPROTEIN SERUM: CPT

## 2018-06-14 PROCEDURE — 1200000003 HC TELEMETRY R&B

## 2018-06-14 PROCEDURE — 83735 ASSAY OF MAGNESIUM: CPT

## 2018-06-14 PROCEDURE — 6360000002 HC RX W HCPCS: Performed by: INTERNAL MEDICINE

## 2018-06-14 PROCEDURE — 80048 BASIC METABOLIC PNL TOTAL CA: CPT

## 2018-06-14 PROCEDURE — 6360000002 HC RX W HCPCS: Performed by: FAMILY MEDICINE

## 2018-06-14 PROCEDURE — 82248 BILIRUBIN DIRECT: CPT

## 2018-06-14 RX ORDER — POTASSIUM CHLORIDE 20 MEQ/1
40 TABLET, EXTENDED RELEASE ORAL 2 TIMES DAILY WITH MEALS
Status: COMPLETED | OUTPATIENT
Start: 2018-06-14 | End: 2018-06-14

## 2018-06-14 RX ORDER — ZIPRASIDONE MESYLATE 20 MG/ML
10 INJECTION, POWDER, LYOPHILIZED, FOR SOLUTION INTRAMUSCULAR ONCE
Status: COMPLETED | OUTPATIENT
Start: 2018-06-14 | End: 2018-06-14

## 2018-06-14 RX ORDER — HALOPERIDOL 5 MG/ML
2 INJECTION INTRAMUSCULAR 2 TIMES DAILY PRN
Status: DISCONTINUED | OUTPATIENT
Start: 2018-06-14 | End: 2018-06-19

## 2018-06-14 RX ORDER — MAGNESIUM SULFATE IN WATER 40 MG/ML
4 INJECTION, SOLUTION INTRAVENOUS ONCE
Status: COMPLETED | OUTPATIENT
Start: 2018-06-14 | End: 2018-06-14

## 2018-06-14 RX ADMIN — POTASSIUM CHLORIDE 40 MEQ: 20 TABLET, EXTENDED RELEASE ORAL at 10:46

## 2018-06-14 RX ADMIN — LORAZEPAM 1 MG: 2 INJECTION INTRAMUSCULAR; INTRAVENOUS at 10:30

## 2018-06-14 RX ADMIN — LORAZEPAM 3 MG: 2 INJECTION INTRAMUSCULAR; INTRAVENOUS at 03:41

## 2018-06-14 RX ADMIN — POTASSIUM CHLORIDE 40 MEQ: 20 TABLET, EXTENDED RELEASE ORAL at 17:26

## 2018-06-14 RX ADMIN — LORAZEPAM 2 MG: 2 TABLET ORAL at 17:25

## 2018-06-14 RX ADMIN — LORAZEPAM 4 MG: 2 INJECTION INTRAMUSCULAR; INTRAVENOUS at 01:59

## 2018-06-14 RX ADMIN — MAGNESIUM SULFATE HEPTAHYDRATE 4 G: 40 INJECTION, SOLUTION INTRAVENOUS at 10:31

## 2018-06-14 RX ADMIN — LORAZEPAM 3 MG: 2 INJECTION INTRAMUSCULAR; INTRAVENOUS at 06:43

## 2018-06-14 RX ADMIN — ZIPRASIDONE MESYLATE 10 MG: 20 INJECTION, POWDER, LYOPHILIZED, FOR SOLUTION INTRAMUSCULAR at 01:11

## 2018-06-14 ASSESSMENT — PAIN SCALES - GENERAL
PAINLEVEL_OUTOF10: 0

## 2018-06-14 ASSESSMENT — PAIN SCALES - WONG BAKER: WONGBAKER_NUMERICALRESPONSE: 0

## 2018-06-14 NOTE — CONSULTS
no rhonchi. HEART:  S1, S2. No S3, S4, or murmur. ABDOMEN:  Soft. No organomegaly. EXTREMITIES:  No clubbing, no cyanosis, no edema. NEURO:  The patient is heavily sedated. Neurologic exam could not be done. LABORATORY DATA:  Sodium 130, potassium 3.4, chloride 93, creatinine 0.5,  BUN 5.  Liver profile: Albumin 3.7, alk phos 51, ALT 48, ammonia elevated  to 83. . CBC, as mentioned above. Ferritin elevated at 1973. His  B12 is elevated at 1335. IMPRESSION:  This is a 15-year-old gentleman who is currently in alcohol  withdrawal.  He has delirium. He is heavily sedated. He has pancytopenia  secondary to his liver disease. The patient does need any transfusion at  the time being. I will watch _____ the patient. Thank you for the consult.         Markus Wilkinson M.D.    D: 06/14/2018 14:37:33       T: 06/14/2018 15:36:58     [email or web address]  Job#: [id number]     Doc#: [id number]    CC:

## 2018-06-14 NOTE — PROGRESS NOTES
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 Subjective:   Patient drowsy, unable to answer questions, opening eyes and falling back asleep, remove restraints    Medications:  Reviewed    Infusion Medications    0.9% NaCl with KCl 20 mEq 50 mL/hr at 06/13/18 1139     Scheduled Medications    sterile water  1.2 mL Intramuscular Once    potassium chloride  40 mEq Oral BID WC    magnesium sulfate  4 g Intravenous Once    multivitamin  1 tablet Oral Daily    folic acid  1 mg Oral Daily    thiamine  100 mg Oral Daily     PRN Meds:       Intake/Output Summary (Last 24 hours) at 06/14/18 0735  Last data filed at 06/14/18 0326   Gross per 24 hour   Intake          1562.47 ml   Output              400 ml   Net          1162.47 ml       Diet:  DIET GENERAL;    Exam:  /68   Pulse 98   Temp 98.7 °F (37.1 °C) (Oral)   Resp 18   Ht 5' 8\" (1.727 m)   Wt 113 lb 3 oz (51.3 kg)   SpO2 92%   BMI 17.21 kg/m²     Physical Examination:   General appearance - drowsy, in no distress  HEENT: Normocephalic, Atraumatic, pupils reactive, mucous membranes moist  Chest - moving equally to respiration,symmetric air entry, clear to auscultation  Heart - normal rate, regular rhythm, normal S1, S2, no murmurs, rubs, clicks or gallops  Abdomen - soft, nontender, nondistended, no masses or organomegaly, BS+  Neurological - drowsy, did not talk much, able to move extremities , mild tremors present  Extremities - peripheral pulses normal, no pedal edema,  Capillary refill less than 3 sec  Skin - normal coloration and turgor, no rashes ?  Mild clubbing   bottom of feet- ? ostemos         Labs:   Recent Labs      06/12/18   1052 10:52 AM      US LIVER   Final Result   Liver ultrasound was essentially normal. Incidental note is made of cholelithiasis and gallbladder sludge. **This report has been created using voice recognition software. It may contain minor errors which are inherent in voice recognition technology. **      Final report electronically signed by Dr. Wendi Sanchez on 6/12/2018 4:03 PM      CT HEAD WO CONTRAST   Final Result   1. No mass effect or acute hemorrhage. 2. Chronic periventricular small vessel ischemic changes and cerebral atrophy. **This report has been created using voice recognition software. It may contain minor errors which are inherent in voice recognition technology. **      Final report electronically signed by Dr. Belén Ferreira on 6/12/2018 11:15 AM      XR CHEST STANDARD (2 VW)   Final Result      No acute intrathoracic process. **This report has been created using voice recognition software. It may contain minor errors which are inherent in voice recognition technology. **      Final report electronically signed by Dr. Belén Ferreira on 6/12/2018 11:10 AM      MRI Brain W WO Contrast    (Results Pending)       Diet: DIET GENERAL;    DVT prophylaxis: [] Lovenox                                 [x] SCDs                                 [] SQ Heparin                                 [] Encourage ambulation           [] Already on Anticoagulation     Disposition:    [x] Home       [] TCU       [] Rehab       [] Psych       [] SNF       [] [place]       [] Other-    Code Status: Full Code    PT/OT Eval Status: ordered pt    Assessment/Plan:    Anticipated Discharge in : 2-3 days    Suspicion for alcohol withdrawal with delirium: CIWA scale and ativan  Psychiatry also consulted      Acute  encephalopathy: substance abuse  presumed to be from substance abuse, cocaine positive but patient denies using cocaine and states he smoked weed?  Laced ,and interestingly

## 2018-06-14 NOTE — PLAN OF CARE
Problem: Falls - Risk of:  Goal: Will remain free from falls  Will remain free from falls   Outcome: Ongoing  Bed alarm on at all times, Pt does not follow commands at this time and is combative and restless when awake. Problem: Restraint Use - Nonviolent/Non-Self-Destructive Behavior:  Goal: Absence of restraint indications  Absence of restraint indications   Outcome: Ongoing  Soft wrist and ankle restraints on per protocol and policy.  Pt is restless and combative and pulls on equiptment when awake  Goal: Absence of restraint-related injury  Absence of restraint-related injury   Outcome: Ongoing  Pt monitored every hour as per policy    Comments: Care plan reviewed with patient and family Patient and family   verbalize understanding of the plan of care and contribute to goal setti

## 2018-06-15 ENCOUNTER — APPOINTMENT (OUTPATIENT)
Dept: MRI IMAGING | Age: 63
DRG: 071 | End: 2018-06-15
Payer: COMMERCIAL

## 2018-06-15 LAB
ANION GAP SERPL CALCULATED.3IONS-SCNC: 14 MEQ/L (ref 8–16)
BUN BLDV-MCNC: 5 MG/DL (ref 7–22)
CALCIUM SERPL-MCNC: 8.5 MG/DL (ref 8.5–10.5)
CHLORIDE BLD-SCNC: 95 MEQ/L (ref 98–111)
CO2: 22 MEQ/L (ref 23–33)
CREAT SERPL-MCNC: 0.6 MG/DL (ref 0.4–1.2)
EER HCV RNA QNT PCR: ABNORMAL
GFR SERPL CREATININE-BSD FRML MDRD: > 90 ML/MIN/1.73M2
GLUCOSE BLD-MCNC: 106 MG/DL (ref 70–108)
HCV RNA QNT REAL-TIME PCR INTERP: DETECTED
HCV RNA QUANT BDNA IU: 5.1 LOG IU
HCV RNA QUANT PCR IU/ML: ABNORMAL IU/ML
HIV-2 AB: NEGATIVE
MAGNESIUM: 2.3 MG/DL (ref 1.6–2.4)
OSMOLALITY URINE: 542 MOSMOL/KG (ref 250–750)
OSMOLALITY: 271 MOSMOL/KG (ref 275–295)
POTASSIUM SERPL-SCNC: 4.8 MEQ/L (ref 3.5–5.2)
SODIUM BLD-SCNC: 131 MEQ/L (ref 135–145)
SODIUM URINE: 34 MEQ/L
URIC ACID: 3.6 MG/DL (ref 3.7–7)

## 2018-06-15 PROCEDURE — 36415 COLL VENOUS BLD VENIPUNCTURE: CPT

## 2018-06-15 PROCEDURE — 84550 ASSAY OF BLOOD/URIC ACID: CPT

## 2018-06-15 PROCEDURE — 6360000002 HC RX W HCPCS: Performed by: FAMILY MEDICINE

## 2018-06-15 PROCEDURE — 99232 SBSQ HOSP IP/OBS MODERATE 35: CPT | Performed by: NURSE PRACTITIONER

## 2018-06-15 PROCEDURE — 99222 1ST HOSP IP/OBS MODERATE 55: CPT | Performed by: NEUROLOGICAL SURGERY

## 2018-06-15 PROCEDURE — 6370000000 HC RX 637 (ALT 250 FOR IP): Performed by: INTERNAL MEDICINE

## 2018-06-15 PROCEDURE — 6370000000 HC RX 637 (ALT 250 FOR IP): Performed by: NURSE PRACTITIONER

## 2018-06-15 PROCEDURE — 6360000002 HC RX W HCPCS: Performed by: PSYCHIATRY & NEUROLOGY

## 2018-06-15 PROCEDURE — 84153 ASSAY OF PSA TOTAL: CPT

## 2018-06-15 PROCEDURE — 1200000003 HC TELEMETRY R&B

## 2018-06-15 PROCEDURE — 6370000000 HC RX 637 (ALT 250 FOR IP): Performed by: PSYCHIATRY & NEUROLOGY

## 2018-06-15 PROCEDURE — 83735 ASSAY OF MAGNESIUM: CPT

## 2018-06-15 PROCEDURE — 6370000000 HC RX 637 (ALT 250 FOR IP): Performed by: FAMILY MEDICINE

## 2018-06-15 PROCEDURE — 99223 1ST HOSP IP/OBS HIGH 75: CPT | Performed by: INTERNAL MEDICINE

## 2018-06-15 PROCEDURE — 83935 ASSAY OF URINE OSMOLALITY: CPT

## 2018-06-15 PROCEDURE — 99232 SBSQ HOSP IP/OBS MODERATE 35: CPT | Performed by: INTERNAL MEDICINE

## 2018-06-15 PROCEDURE — 84300 ASSAY OF URINE SODIUM: CPT

## 2018-06-15 PROCEDURE — 80048 BASIC METABOLIC PNL TOTAL CA: CPT

## 2018-06-15 PROCEDURE — 84154 ASSAY OF PSA FREE: CPT

## 2018-06-15 PROCEDURE — 51798 US URINE CAPACITY MEASURE: CPT

## 2018-06-15 PROCEDURE — 83930 ASSAY OF BLOOD OSMOLALITY: CPT

## 2018-06-15 PROCEDURE — 70551 MRI BRAIN STEM W/O DYE: CPT

## 2018-06-15 RX ORDER — CARVEDILOL 3.12 MG/1
3.12 TABLET ORAL 2 TIMES DAILY
Status: DISCONTINUED | OUTPATIENT
Start: 2018-06-15 | End: 2018-06-17

## 2018-06-15 RX ORDER — PANTOPRAZOLE SODIUM 40 MG/1
40 TABLET, DELAYED RELEASE ORAL
Status: DISCONTINUED | OUTPATIENT
Start: 2018-06-16 | End: 2018-06-19 | Stop reason: HOSPADM

## 2018-06-15 RX ORDER — ASPIRIN 81 MG/1
81 TABLET, CHEWABLE ORAL DAILY
Status: DISCONTINUED | OUTPATIENT
Start: 2018-06-15 | End: 2018-06-19 | Stop reason: HOSPADM

## 2018-06-15 RX ADMIN — LORAZEPAM 2 MG: 2 INJECTION INTRAMUSCULAR; INTRAVENOUS at 21:15

## 2018-06-15 RX ADMIN — LORAZEPAM 2 MG: 2 TABLET ORAL at 05:39

## 2018-06-15 RX ADMIN — FOLIC ACID 1 MG: 1 TABLET ORAL at 12:50

## 2018-06-15 RX ADMIN — THERA TABS 1 TABLET: TAB at 12:50

## 2018-06-15 RX ADMIN — CARVEDILOL 3.12 MG: 3.12 TABLET, FILM COATED ORAL at 20:04

## 2018-06-15 RX ADMIN — LORAZEPAM 2 MG: 2 INJECTION INTRAMUSCULAR; INTRAVENOUS at 18:53

## 2018-06-15 RX ADMIN — CARVEDILOL 3.12 MG: 3.12 TABLET, FILM COATED ORAL at 15:15

## 2018-06-15 RX ADMIN — LORAZEPAM 2 MG: 2 INJECTION INTRAMUSCULAR; INTRAVENOUS at 20:03

## 2018-06-15 RX ADMIN — HALOPERIDOL LACTATE 2 MG: 5 INJECTION, SOLUTION INTRAMUSCULAR at 23:11

## 2018-06-15 RX ADMIN — Medication 100 MG: at 15:15

## 2018-06-15 RX ADMIN — ASPIRIN 81 MG 81 MG: 81 TABLET ORAL at 20:04

## 2018-06-15 ASSESSMENT — ENCOUNTER SYMPTOMS
CHEST TIGHTNESS: 0
TROUBLE SWALLOWING: 0
ABDOMINAL PAIN: 0
BACK PAIN: 0

## 2018-06-15 ASSESSMENT — PAIN SCALES - GENERAL
PAINLEVEL_OUTOF10: 0

## 2018-06-15 NOTE — PROGRESS NOTES
Cardiology Progress Note      Patient:  Valery Gamez  YOB: 1955  MRN: [id number]   Acct: [de-identified]  Admit Date:  6/12/2018  Primary Cardiologist: none  Seen by Dr. Anurag Ballard     Per prior cardiology consult note-  CHIEF COMPLAINT: CHF        HPI: This is a pleasant 57 y.o. male presents with dyspnea. Initial presentation involves altered mental status, combative, and dizziness, and inability to perform his ADLs. Hx of HTN only. CT head with chronic periventricular small vessel ischemic changes. Cr 0.5. TTE showing EF 35-40%, moderate global hypokinesis of the LV. ECG shows NSR. Neurology consulted. + smoker. + pancytopenia with Hematology following. He is very sleep and does communicate is not in pain, but otherwise the history is difficult to obtain.     Noted ETOH abuse and canbinoids in tox screen     Subjective (Events in last 24 hours):     He is alert today  - oriented to self only - he does know his sister whom is in the room     He denies any chest pains or SOB PTA  No family Hx CAD - only CVA      Objective:   BP 96/60   Pulse 87   Temp 97.9 °F (36.6 °C) (Oral)   Resp 16   Ht 5' 8\" (1.727 m)   Wt 113 lb 12.8 oz (51.6 kg)   SpO2 100%   BMI 17.30 kg/m²        TELEMETRY: SR    Physical Exam:  General Appearance: alert and oriented to person - in no acute distress  Cardiovascular: normal rate, regular rhythm, normal S1 and S2, no murmurs, rubs, clicks, or gallops, distal pulses intact,  Pulmonary/Chest: clear to auscultation bilaterally- no wheezes, rales or rhonchi, normal air movement, no respiratory distress  Abdomen: soft, non-tender, non-distended, normal bowel sounds, no masses Extremities: no cyanosis, clubbing or edema, radial and pedal pulses present  Skin: warm and dry, no rash or erythema  Head: normocephalic and atraumatic   Musculoskeletal: normal range of motion, no joint swelling, deformity or tenderness  Neurological: alert, oriented to self, normal speech, no 45 06/13/2018       TSH:    Lab Results   Component Value Date    TSH 1.380 06/12/2018         Assessment:  AMS - improving    CDMP with EF 35-40%    Elevated LFT - improving    hyponatremia  ETOH abuse  Cocaine abuse    Pancytopenia    HTN    Hx CVA        Plan:    Try stress in am     Try coreg 3.125mg po BID   No ACE /ARB with low BP    No statin with elevated LFT  No ASA with pancytopenia    No need for LV at this time        Electronically signed by MORRIS Chaudhary CNP on 6/15/2018 at 1:17 PM

## 2018-06-15 NOTE — CONSULTS
Current Facility-Administered Medications   Medication Dose Route Frequency Provider Last Rate Last Dose    carvedilol (COREG) tablet 3.125 mg  3.125 mg Oral BID Ruslan Lombardi APRN - CNP        sterile water injection 1.2 mL  1.2 mL Intramuscular Once Kanwal Udeagbala, DO        haloperidol lactate (HALDOL) injection 2 mg  2 mg Intravenous BID PRN Shruthi Armendariz MD        multivitamin 1 tablet  1 tablet Oral Daily Mariza Lewis MD   1 tablet at 06/15/18 1250    LORazepam (ATIVAN) tablet 1 mg  1 mg Oral Q1H PRN Kanwal Udeagbala, DO        Or    LORazepam (ATIVAN) injection 1 mg  1 mg Intravenous Q1H PRN Kanwal Udeagbala, DO   1 mg at 06/14/18 1030    Or    LORazepam (ATIVAN) tablet 2 mg  2 mg Oral Q1H PRN Kanwal Udeagbala, DO   2 mg at 06/15/18 0539    Or    LORazepam (ATIVAN) injection 2 mg  2 mg Intravenous Q1H PRN Kanwal Udeagbala, DO        Or    LORazepam (ATIVAN) tablet 3 mg  3 mg Oral Q1H PRN Kanwal Udeagbala, DO        Or    LORazepam (ATIVAN) injection 3 mg  3 mg Intravenous Q1H PRN Kanwal Udeagbala, DO   3 mg at 06/14/18 2556    Or    LORazepam (ATIVAN) tablet 4 mg  4 mg Oral Q1H PRN Kanwal Udeagbala, DO        Or    LORazepam (ATIVAN) injection 4 mg  4 mg Intravenous Q1H PRN Kanwal Udeagbala, DO   4 mg at 93/14/62 5006    folic acid (FOLVITE) tablet 1 mg  1 mg Oral Daily Agustin Locke MD   1 mg at 06/15/18 1250    vitamin B-1 (THIAMINE) tablet 100 mg  100 mg Oral Daily Zuleyma Swann MD   100 mg at 06/13/18 1134          haloperidol lactate 2 mg BID PRN   LORazepam 1 mg Q1H PRN   Or     LORazepam 1 mg Q1H PRN   Or     LORazepam 2 mg Q1H PRN   Or     LORazepam 2 mg Q1H PRN   Or     LORazepam 3 mg Q1H PRN   Or     LORazepam 3 mg Q1H PRN   Or     LORazepam 4 mg Q1H PRN   Or     LORazepam 4 mg Q1H PRN             ALLERGIES:   Sulfa antibiotics    PAST MEDICAL  HISTORY:    has a past medical history of Asthma; Cerebral artery occlusion with

## 2018-06-15 NOTE — PROGRESS NOTES
Gastroenterology  Progress Note    6/15/2018 5:40 PM  Subjective:   Admit Date: 6/12/2018    Interval History:  Labs reviewed, electrolytes improving. No new LFTs, ammonia today. Pt reports epigastric discomfort \"manageable. \"  Pt N/V. Denies GERD symptoms. Reports he had EGD and colonoscopy \"5 years ago in [place]. \"  He reports one polyp at that time. Diet: DIET GENERAL;  Diet NPO Time Specified    Patient Active Problem List:     Encephalopathy acute     Tremor due to substance abuse     Metabolic encephalopathy     Altered mental status     Alcohol withdrawal delirium (HCC)     Cocaine abuse     Pancytopenia (HCC)     Hepatitis C antibody positive in blood     Alcohol abuse     Current smoker     Dehydration     Hyponatremia     Hypokalemia     Hypomagnesemia     Elevated ferritin        Medications:   Scheduled Meds:   carvedilol  3.125 mg Oral BID    sterile water  1.2 mL Intramuscular Once    multivitamin  1 tablet Oral Daily    folic acid  1 mg Oral Daily    thiamine  100 mg Oral Daily     Continuous Infusions:  CBC:   Recent Labs      06/13/18   0449  06/14/18   0544   WBC  2.5*  4.2*   HGB  10.0*  10.6*   PLT  69*  70*     BMP:    Recent Labs      06/14/18   0000  06/14/18   0544  06/15/18   0324   NA  129*  130*  131*   K  4.1  3.4*  4.8   CL  93*  93*  95*   CO2  21*  23  22*   BUN  6*  5*  5*   CREATININE  0.6  0.5  0.6   GLUCOSE  125*  99  106     Hepatic:   Recent Labs      06/14/18   0544   AST  83*   ALT  48   BILITOT  1.2   ALKPHOS  51     INR:   Recent Labs      06/14/18   0544   INR  1.20*   Results for Katty Grade (MRN [id number]) as of 6/14/2018 16:31   Ref. Range 6/12/2018 11:16   Ammonia Latest Ref Range: 11 - 60 umol/L 31   Results for Katty Grade (MRN [id number]) as of 6/14/2018 16:31   Ref. Range 6/12/2018 10:55   Lipase Latest Ref Range: 5.6 - 51.3 U/L 75.7 (H)   Results for Katty Grade (MRN [id number]) as of 6/14/2018 16:31   Ref.  Range 6/14/2018

## 2018-06-15 NOTE — CONSULTS
[facility]    Patient's Name/Date of Birth: Makayla Mckeon / 1955 (4 y.o.)    Date: June 14, 2018     Referring Provider: Sky Small MD    CHIEF COMPLAINT: CHF      HPI: This is a pleasant 57 y.o. male presents with dyspnea. Initial presentation involves altered mental status, combative, and dizziness, and inability to perform his ADLs. Hx of HTN only. CT head with chronic periventricular small vessel ischemic changes. Cr 0.5. TTE showing EF 35-40%, moderate global hypokinesis of the LV. ECG shows NSR. Neurology consulted. + smoker. + pancytopenia with Hematology following. He is very sleep and does communicate is not in pain, but otherwise the history is difficult to obtain. Echo:   Results for orders placed during the hospital encounter of 06/12/18   Echocardiogram 2D/ M-Mode/ Colorflow/ Do    Narrative Transthoracic Echocardiography Report (TTE)     Demographics      Patient Name   Noel Fields       Gender              Male                  [address]      MR #           [id number]        Race                Black                                      Ethnicity      Account #      [de-identified]        Room Number         [address]      [id number]        Date of Study       06/13/2018   Number      Date of Birth  1955       Referring Physician Enio Cano MD      Age            57 year(s)       34 [facility],                                                       T                                      Interpreting        Echo reader of the                                   Physician           emilee Sow MD     Procedure    Type of Study      TTE procedure:ECHOCARDIOGRAM COMPLETE 2D W DOPPLER W COLOR.      Procedure Date  Date: 06/13/2018 Start: 10:32 AM    Study Location: Bedside  Technical Quality: Adequate visualization    Indications:Evaluate left Kanwal Udeagbala, DO        Or    LORazepam (ATIVAN) injection 1 mg  1 mg Intravenous Q1H PRN Kanwal Udeagbala, DO   1 mg at 06/14/18 1030    Or    LORazepam (ATIVAN) tablet 2 mg  2 mg Oral Q1H PRN Kanwal Udeagbala, DO   2 mg at 06/14/18 1725    Or    LORazepam (ATIVAN) injection 2 mg  2 mg Intravenous Q1H PRN Kanwal Udeagbala, DO        Or    LORazepam (ATIVAN) tablet 3 mg  3 mg Oral Q1H PRN Kanwal Udeagbala, DO        Or    LORazepam (ATIVAN) injection 3 mg  3 mg Intravenous Q1H PRN Aknwal Udeagbala, DO   3 mg at 06/14/18 9707    Or    LORazepam (ATIVAN) tablet 4 mg  4 mg Oral Q1H PRN Kanwal Udeagbala, DO        Or    LORazepam (ATIVAN) injection 4 mg  4 mg Intravenous Q1H PRN Kanwal Udeagbala, DO   4 mg at 57/79/65 5224    folic acid (FOLVITE) tablet 1 mg  1 mg Oral Daily Jose Boss MD   1 mg at 06/13/18 1134    vitamin B-1 (THIAMINE) tablet 100 mg  100 mg Oral Daily Mitra Eckert MD   100 mg at 06/13/18 1134     Prior to Admission medications    Medication Sig Start Date End Date Taking? Authorizing Provider   amLODIPine (NORVASC) 10 MG tablet Take 10 mg by mouth daily   Yes Historical Provider, MD   Scheduled Meds:   sterile water  1.2 mL Intramuscular Once    multivitamin  1 tablet Oral Daily    folic acid  1 mg Oral Daily    thiamine  100 mg Oral Daily     Continuous Infusions:  PRN Meds:.haloperidol lactate, LORazepam **OR** LORazepam **OR** LORazepam **OR** LORazepam **OR** LORazepam **OR** LORazepam **OR** LORazepam **OR** LORazepam    Allergies   Allergen Reactions    Sulfa Antibiotics Anaphylaxis     Family History   Problem Relation Age of Onset    Other Mother      alzheimers    Heart Disease Father     Cancer Brother      colon    Heart Disease Brother      Social History     Social History    Marital status:      Spouse name: N/A    Number of children: 0    Years of education: N/A     Occupational History    Not on file.      Social 06/12/2018    GLUCOSEU NEGATIVE 06/12/2018         Physical Exam:  Vitals:    06/14/18 2001   BP: 115/86   Pulse: 92   Resp: 18   Temp: 99.4 °F (37.4 °C)   SpO2: 98%      Intake/Output Summary (Last 24 hours) at 06/14/18 2101  Last data filed at 06/14/18 2016   Gross per 24 hour   Intake          1014.95 ml   Output              300 ml   Net           714.95 ml      General:  No acute distress  Neck: Supple, no JVD, no carotid bruits  Heart: Regular rhythm normal S1 and S2, no rubs, murmurs or gallops  Lungs: clear to ascultation no rales, wheezes, or rhonchi  Abdomen: positive bowel sounds, soft, non-tender, non-distended, no bruits, no masses  Extremities:no clubbing, cyanosis or edema  Neurologic: Confused, moving all extremities  Skin: No rashes    Assessment:  Metabolic encephalopathy - alcohol withdrawal  Cardiomyopathy - EF 35-40% - ddx: alcohol, ischemic, drugs, viral, Takotsubo, combination of the above  + Smoker  + Cocaine  Pancytopenia  Hyponatremia  Hypokalemia  Hepatitis C Ab +    Plan:  1. CIWA protocol  2. Needs cardiomyopathy work-up, but need to stabilize from withdrawal of alcohol and cocaine  3. K > 4, Mg > 2  4. If and when stable, consider Lexiscan (optimally would prefer [facility], however, pancytopenia places the patient at high risk for bleeding complications, especially if he is combative and non-compliant) to help gauge the amount, if any, of ischemia/infarction  5. Nicotine cessation  6. Once cocaine is clear, consider starting Metoprolol 25 mg XL, Entresto as outpatient  7. Add ASA if Hematology does not have issues due to pancytopenia  8. Avoid statin for now given concerns for Hepatitis  9. Further recommendations based on results and clinical course    Thank you for allowing us to participate in the care of this patient. Please do not hesitate to call us with questions.     Electronically signed by Miguel Angel Acevedo MD on 6/14/2018 at 9:01 PM

## 2018-06-15 NOTE — PROGRESS NOTES
Final report electronically signed by Dr. Dangelo Neff on 6/13/2018 11:15 AM      XR FOOT LEFT (MIN 3 VIEWS)   Final Result   1. There is irregularity of the soft tissues along the plantar aspect of the foot which could represent the palpable area of clinical concern and could possibly be related to plantar fibromatosis. MRI of the foot without and with intravenous contrast    could be performed for additional evaluation. **This report has been created using voice recognition software. It may contain minor errors which are inherent in voice recognition technology. **      Final report electronically signed by Dr. Dangelo Neff on 6/13/2018 10:52 AM      US LIVER   Final Result   Liver ultrasound was essentially normal. Incidental note is made of cholelithiasis and gallbladder sludge. **This report has been created using voice recognition software. It may contain minor errors which are inherent in voice recognition technology. **      Final report electronically signed by Dr. Julieta Wen on 6/12/2018 4:03 PM      CT HEAD WO CONTRAST   Final Result   1. No mass effect or acute hemorrhage. 2. Chronic periventricular small vessel ischemic changes and cerebral atrophy. **This report has been created using voice recognition software. It may contain minor errors which are inherent in voice recognition technology. **      Final report electronically signed by Dr. Elian Gutierrez on 6/12/2018 11:15 AM      XR CHEST STANDARD (2 VW)   Final Result      No acute intrathoracic process. **This report has been created using voice recognition software. It may contain minor errors which are inherent in voice recognition technology. **      Final report electronically signed by Dr. Elian Gutierrez on 6/12/2018 11:10 AM      MRI Brain W WO Contrast    (Results Pending)       Diet: DIET GENERAL;    DVT prophylaxis: [] Lovenox                                 [x] SCDs consulted GI to see if the patient will need liver biopsy to rule out hemochromatosis, her ultrasound does not show any cirrhosis  - follow with GI    Cholelithiasis : incidental finding     Essential hypertension, currently not taking any medications apparently was on Norvasc at one point, will hold medications and monitor    History of stroke, no residual deficits      Electronically signed by Edita Rausch MD on 6/15/2018 at 7:20 AM

## 2018-06-15 NOTE — PROGRESS NOTES
A Bladder scan was performed at 0438 . The patient's last void was at unsure . The residual amount was measured to be 261 ML. Report of results was given to [facility].

## 2018-06-16 LAB
ANION GAP SERPL CALCULATED.3IONS-SCNC: 13 MEQ/L (ref 8–16)
ANISOCYTOSIS: ABNORMAL
BASOPHILS # BLD: 0.8 %
BASOPHILS ABSOLUTE: 0 THOU/MM3 (ref 0–0.1)
BUN BLDV-MCNC: 7 MG/DL (ref 7–22)
CALCIUM SERPL-MCNC: 8.8 MG/DL (ref 8.5–10.5)
CHLORIDE BLD-SCNC: 92 MEQ/L (ref 98–111)
CO2: 21 MEQ/L (ref 23–33)
CREAT SERPL-MCNC: 0.6 MG/DL (ref 0.4–1.2)
EOSINOPHIL # BLD: 2.8 %
EOSINOPHILS ABSOLUTE: 0.1 THOU/MM3 (ref 0–0.4)
GFR SERPL CREATININE-BSD FRML MDRD: > 90 ML/MIN/1.73M2
GLUCOSE BLD-MCNC: 88 MG/DL (ref 70–108)
HCT VFR BLD CALC: 30.9 % (ref 42–52)
HEMOGLOBIN: 10.6 GM/DL (ref 14–18)
LYMPHOCYTES # BLD: 50.3 %
LYMPHOCYTES ABSOLUTE: 1.8 THOU/MM3 (ref 1–4.8)
MACROCYTES: ABNORMAL
MCH RBC QN AUTO: 35.7 PG (ref 27–31)
MCHC RBC AUTO-ENTMCNC: 34.4 GM/DL (ref 33–37)
MCV RBC AUTO: 103.8 FL (ref 80–94)
MONOCYTES # BLD: 19.2 %
MONOCYTES ABSOLUTE: 0.7 THOU/MM3 (ref 0.4–1.3)
NUCLEATED RED BLOOD CELLS: 0 /100 WBC
PDW BLD-RTO: 15.1 % (ref 11.5–14.5)
PLATELET # BLD: 104 THOU/MM3 (ref 130–400)
PMV BLD AUTO: 9.2 FL (ref 7.4–10.4)
POTASSIUM SERPL-SCNC: 4.2 MEQ/L (ref 3.5–5.2)
PROSTATE SPECIFIC ANTIGEN FREE: NORMAL
RBC # BLD: 2.98 MILL/MM3 (ref 4.7–6.1)
SEG NEUTROPHILS: 26.9 %
SEGMENTED NEUTROPHILS ABSOLUTE COUNT: 0.9 THOU/MM3 (ref 1.8–7.7)
SODIUM BLD-SCNC: 126 MEQ/L (ref 135–145)
WBC # BLD: 3.5 THOU/MM3 (ref 4.8–10.8)

## 2018-06-16 PROCEDURE — 80048 BASIC METABOLIC PNL TOTAL CA: CPT

## 2018-06-16 PROCEDURE — 6370000000 HC RX 637 (ALT 250 FOR IP): Performed by: NURSE PRACTITIONER

## 2018-06-16 PROCEDURE — 36415 COLL VENOUS BLD VENIPUNCTURE: CPT

## 2018-06-16 PROCEDURE — 99232 SBSQ HOSP IP/OBS MODERATE 35: CPT | Performed by: INTERNAL MEDICINE

## 2018-06-16 PROCEDURE — 6370000000 HC RX 637 (ALT 250 FOR IP): Performed by: INTERNAL MEDICINE

## 2018-06-16 PROCEDURE — 6370000000 HC RX 637 (ALT 250 FOR IP): Performed by: PSYCHIATRY & NEUROLOGY

## 2018-06-16 PROCEDURE — 6370000000 HC RX 637 (ALT 250 FOR IP): Performed by: FAMILY MEDICINE

## 2018-06-16 PROCEDURE — 99231 SBSQ HOSP IP/OBS SF/LOW 25: CPT | Performed by: NURSE PRACTITIONER

## 2018-06-16 PROCEDURE — 1200000003 HC TELEMETRY R&B

## 2018-06-16 PROCEDURE — 85025 COMPLETE CBC W/AUTO DIFF WBC: CPT

## 2018-06-16 PROCEDURE — 6360000002 HC RX W HCPCS: Performed by: FAMILY MEDICINE

## 2018-06-16 RX ADMIN — THERA TABS 1 TABLET: TAB at 11:39

## 2018-06-16 RX ADMIN — CARVEDILOL 3.12 MG: 3.12 TABLET, FILM COATED ORAL at 20:29

## 2018-06-16 RX ADMIN — PANTOPRAZOLE SODIUM 40 MG: 40 TABLET, DELAYED RELEASE ORAL at 11:40

## 2018-06-16 RX ADMIN — PANTOPRAZOLE SODIUM 40 MG: 40 TABLET, DELAYED RELEASE ORAL at 17:30

## 2018-06-16 RX ADMIN — ASPIRIN 81 MG 81 MG: 81 TABLET ORAL at 11:39

## 2018-06-16 RX ADMIN — LORAZEPAM 1 MG: 1 TABLET ORAL at 20:29

## 2018-06-16 RX ADMIN — LORAZEPAM 2 MG: 2 INJECTION INTRAMUSCULAR; INTRAVENOUS at 23:05

## 2018-06-16 RX ADMIN — Medication 100 MG: at 11:39

## 2018-06-16 RX ADMIN — LORAZEPAM 2 MG: 2 TABLET ORAL at 21:43

## 2018-06-16 RX ADMIN — FOLIC ACID 1 MG: 1 TABLET ORAL at 11:39

## 2018-06-16 ASSESSMENT — PAIN SCALES - GENERAL
PAINLEVEL_OUTOF10: 0

## 2018-06-16 NOTE — PROGRESS NOTES
murmur, click, rub or gallop  Abdomen: soft, non-tender; bowel sounds normal; no masses,  no organomegaly  Extremities: extremities normal, atraumatic, no cyanosis or edema    Assessment and Plan:   1. Altered mental status - ammonia level normal, likely not hepatic encephalopathy. EEG showed encephalopathy. 2. Elevated LFTs - d/t chronic alcohol abuse and (+) Hep C. Liver US showed no evidence of cirrhosis. LFTs improving since admission. Continue to monitor. Repeat levels in am.  3. Acute alcohol withdrawal - CIWA protocol continues. 4. Colonoscopy to be consider, we have to confirm the last time colonoscopy  Was done, patient tilling me few months only ,  history of polyps as it , in the past .   5. Epigastric discomfort - start PPI BID. If no improvement, consider EGD.       Follow up in GI Clinic after discharge in  week(s)    Manuel Manriquez MD  6/16/2018  1:24 PM

## 2018-06-16 NOTE — PROGRESS NOTES
MRI brain performed yesterday was reviewed and discussed with radiology via phone on 6/15/2018, will need to repeat study in 2-4 weeks as outpatient. He was not cooperative to undergo the MRI brain sooner. Suspect findings are related to ETOH abuse.

## 2018-06-17 LAB
ANION GAP SERPL CALCULATED.3IONS-SCNC: 16 MEQ/L (ref 8–16)
BUN BLDV-MCNC: 7 MG/DL (ref 7–22)
CALCIUM SERPL-MCNC: 8.8 MG/DL (ref 8.5–10.5)
CHLORIDE BLD-SCNC: 89 MEQ/L (ref 98–111)
CO2: 21 MEQ/L (ref 23–33)
CREAT SERPL-MCNC: 0.6 MG/DL (ref 0.4–1.2)
GFR SERPL CREATININE-BSD FRML MDRD: > 90 ML/MIN/1.73M2
GLUCOSE BLD-MCNC: 107 MG/DL (ref 70–108)
POTASSIUM SERPL-SCNC: 3.8 MEQ/L (ref 3.5–5.2)
SODIUM BLD-SCNC: 126 MEQ/L (ref 135–145)

## 2018-06-17 PROCEDURE — 36415 COLL VENOUS BLD VENIPUNCTURE: CPT

## 2018-06-17 PROCEDURE — 6370000000 HC RX 637 (ALT 250 FOR IP): Performed by: PSYCHIATRY & NEUROLOGY

## 2018-06-17 PROCEDURE — 6360000002 HC RX W HCPCS: Performed by: FAMILY MEDICINE

## 2018-06-17 PROCEDURE — 6370000000 HC RX 637 (ALT 250 FOR IP): Performed by: NURSE PRACTITIONER

## 2018-06-17 PROCEDURE — 6370000000 HC RX 637 (ALT 250 FOR IP): Performed by: INTERNAL MEDICINE

## 2018-06-17 PROCEDURE — 1200000003 HC TELEMETRY R&B

## 2018-06-17 PROCEDURE — 93017 CV STRESS TEST TRACING ONLY: CPT | Performed by: INTERNAL MEDICINE

## 2018-06-17 PROCEDURE — 6370000000 HC RX 637 (ALT 250 FOR IP): Performed by: FAMILY MEDICINE

## 2018-06-17 PROCEDURE — 78452 HT MUSCLE IMAGE SPECT MULT: CPT | Performed by: INTERNAL MEDICINE

## 2018-06-17 PROCEDURE — A9500 TC99M SESTAMIBI: HCPCS | Performed by: NURSE PRACTITIONER

## 2018-06-17 PROCEDURE — 99232 SBSQ HOSP IP/OBS MODERATE 35: CPT | Performed by: INTERNAL MEDICINE

## 2018-06-17 PROCEDURE — 6360000002 HC RX W HCPCS

## 2018-06-17 PROCEDURE — 3430000000 HC RX DIAGNOSTIC RADIOPHARMACEUTICAL: Performed by: NURSE PRACTITIONER

## 2018-06-17 PROCEDURE — 80048 BASIC METABOLIC PNL TOTAL CA: CPT

## 2018-06-17 RX ORDER — CARVEDILOL 6.25 MG/1
6.25 TABLET ORAL 2 TIMES DAILY WITH MEALS
Status: DISCONTINUED | OUTPATIENT
Start: 2018-06-17 | End: 2018-06-18

## 2018-06-17 RX ORDER — SODIUM CHLORIDE 1000 MG
1 TABLET, SOLUBLE MISCELLANEOUS
Status: DISCONTINUED | OUTPATIENT
Start: 2018-06-17 | End: 2018-06-19 | Stop reason: HOSPADM

## 2018-06-17 RX ORDER — LISINOPRIL 2.5 MG/1
2.5 TABLET ORAL DAILY
Status: DISCONTINUED | OUTPATIENT
Start: 2018-06-17 | End: 2018-06-19 | Stop reason: HOSPADM

## 2018-06-17 RX ADMIN — SODIUM CHLORIDE TAB 1 GM 1 G: 1 TAB at 14:36

## 2018-06-17 RX ADMIN — Medication 100 MG: at 10:51

## 2018-06-17 RX ADMIN — Medication 34.6 MILLICURIE: at 09:47

## 2018-06-17 RX ADMIN — LISINOPRIL 2.5 MG: 2.5 TABLET ORAL at 14:36

## 2018-06-17 RX ADMIN — Medication 10.8 MILLICURIE: at 08:20

## 2018-06-17 RX ADMIN — ASPIRIN 81 MG 81 MG: 81 TABLET ORAL at 10:51

## 2018-06-17 RX ADMIN — LORAZEPAM 2 MG: 2 INJECTION INTRAMUSCULAR; INTRAVENOUS at 01:59

## 2018-06-17 RX ADMIN — PANTOPRAZOLE SODIUM 40 MG: 40 TABLET, DELAYED RELEASE ORAL at 10:51

## 2018-06-17 RX ADMIN — FOLIC ACID 1 MG: 1 TABLET ORAL at 10:51

## 2018-06-17 RX ADMIN — THERA TABS 1 TABLET: TAB at 10:51

## 2018-06-17 RX ADMIN — CARVEDILOL 3.12 MG: 3.12 TABLET, FILM COATED ORAL at 10:51

## 2018-06-17 ASSESSMENT — PAIN SCALES - GENERAL
PAINLEVEL_OUTOF10: 0
PAINLEVEL_OUTOF10: 0

## 2018-06-18 LAB
ALBUMIN SERPL-MCNC: 3.3 G/DL (ref 3.5–5.1)
ALP BLD-CCNC: 49 U/L (ref 38–126)
ALT SERPL-CCNC: 31 U/L (ref 11–66)
ANION GAP SERPL CALCULATED.3IONS-SCNC: 15 MEQ/L (ref 8–16)
AST SERPL-CCNC: 45 U/L (ref 5–40)
BILIRUB SERPL-MCNC: 1.3 MG/DL (ref 0.3–1.2)
BILIRUBIN DIRECT: 0.6 MG/DL (ref 0–0.3)
BUN BLDV-MCNC: 7 MG/DL (ref 7–22)
CALCIUM SERPL-MCNC: 9.2 MG/DL (ref 8.5–10.5)
CHLORIDE BLD-SCNC: 91 MEQ/L (ref 98–111)
CO2: 21 MEQ/L (ref 23–33)
CREAT SERPL-MCNC: 0.5 MG/DL (ref 0.4–1.2)
FERRITIN: 1522 NG/ML (ref 22–322)
GFR SERPL CREATININE-BSD FRML MDRD: > 90 ML/MIN/1.73M2
GLUCOSE BLD-MCNC: 90 MG/DL (ref 70–108)
HCT VFR BLD CALC: 31.6 % (ref 42–52)
HEMOGLOBIN: 10.7 GM/DL (ref 14–18)
INR BLD: 1.3 (ref 0.85–1.13)
IRON: 67 UG/DL (ref 65–195)
LIPASE: 49.6 U/L (ref 5.6–51.3)
MCH RBC QN AUTO: 35.2 PG (ref 27–31)
MCHC RBC AUTO-ENTMCNC: 33.8 GM/DL (ref 33–37)
MCV RBC AUTO: 104.1 FL (ref 80–94)
PDW BLD-RTO: 13.8 % (ref 11.5–14.5)
PLATELET # BLD: 132 THOU/MM3 (ref 130–400)
PMV BLD AUTO: 9.3 FL (ref 7.4–10.4)
POTASSIUM SERPL-SCNC: 3.8 MEQ/L (ref 3.5–5.2)
RBC # BLD: 3.03 MILL/MM3 (ref 4.7–6.1)
SODIUM BLD-SCNC: 127 MEQ/L (ref 135–145)
TOTAL PROTEIN: 7.2 G/DL (ref 6.1–8)
WBC # BLD: 3 THOU/MM3 (ref 4.8–10.8)

## 2018-06-18 PROCEDURE — 83690 ASSAY OF LIPASE: CPT

## 2018-06-18 PROCEDURE — 6370000000 HC RX 637 (ALT 250 FOR IP): Performed by: INTERNAL MEDICINE

## 2018-06-18 PROCEDURE — 83540 ASSAY OF IRON: CPT

## 2018-06-18 PROCEDURE — 82728 ASSAY OF FERRITIN: CPT

## 2018-06-18 PROCEDURE — 6370000000 HC RX 637 (ALT 250 FOR IP): Performed by: NURSE PRACTITIONER

## 2018-06-18 PROCEDURE — 6370000000 HC RX 637 (ALT 250 FOR IP): Performed by: PSYCHIATRY & NEUROLOGY

## 2018-06-18 PROCEDURE — 82248 BILIRUBIN DIRECT: CPT

## 2018-06-18 PROCEDURE — 36415 COLL VENOUS BLD VENIPUNCTURE: CPT

## 2018-06-18 PROCEDURE — 80053 COMPREHEN METABOLIC PANEL: CPT

## 2018-06-18 PROCEDURE — 97116 GAIT TRAINING THERAPY: CPT

## 2018-06-18 PROCEDURE — 99231 SBSQ HOSP IP/OBS SF/LOW 25: CPT | Performed by: NURSE PRACTITIONER

## 2018-06-18 PROCEDURE — 1200000003 HC TELEMETRY R&B

## 2018-06-18 PROCEDURE — 85610 PROTHROMBIN TIME: CPT

## 2018-06-18 PROCEDURE — 85027 COMPLETE CBC AUTOMATED: CPT

## 2018-06-18 PROCEDURE — 6370000000 HC RX 637 (ALT 250 FOR IP): Performed by: FAMILY MEDICINE

## 2018-06-18 PROCEDURE — 99232 SBSQ HOSP IP/OBS MODERATE 35: CPT | Performed by: INTERNAL MEDICINE

## 2018-06-18 PROCEDURE — 97530 THERAPEUTIC ACTIVITIES: CPT

## 2018-06-18 RX ORDER — CARVEDILOL 6.25 MG/1
6.25 TABLET ORAL 2 TIMES DAILY WITH MEALS
Status: DISCONTINUED | OUTPATIENT
Start: 2018-06-18 | End: 2018-06-19 | Stop reason: HOSPADM

## 2018-06-18 RX ADMIN — ASPIRIN 81 MG 81 MG: 81 TABLET ORAL at 09:21

## 2018-06-18 RX ADMIN — CARVEDILOL 6.25 MG: 6.25 TABLET, FILM COATED ORAL at 09:20

## 2018-06-18 RX ADMIN — LISINOPRIL 2.5 MG: 2.5 TABLET ORAL at 09:20

## 2018-06-18 RX ADMIN — PANTOPRAZOLE SODIUM 40 MG: 40 TABLET, DELAYED RELEASE ORAL at 06:58

## 2018-06-18 RX ADMIN — CARVEDILOL 6.25 MG: 6.25 TABLET, FILM COATED ORAL at 17:28

## 2018-06-18 RX ADMIN — THERA TABS 1 TABLET: TAB at 09:20

## 2018-06-18 RX ADMIN — SODIUM CHLORIDE TAB 1 GM 1 G: 1 TAB at 11:36

## 2018-06-18 RX ADMIN — PANTOPRAZOLE SODIUM 40 MG: 40 TABLET, DELAYED RELEASE ORAL at 17:28

## 2018-06-18 RX ADMIN — Medication 100 MG: at 09:20

## 2018-06-18 RX ADMIN — SODIUM CHLORIDE TAB 1 GM 1 G: 1 TAB at 17:28

## 2018-06-18 RX ADMIN — SODIUM CHLORIDE TAB 1 GM 1 G: 1 TAB at 09:20

## 2018-06-18 RX ADMIN — FOLIC ACID 1 MG: 1 TABLET ORAL at 09:22

## 2018-06-18 ASSESSMENT — PAIN SCALES - GENERAL: PAINLEVEL_OUTOF10: 0

## 2018-06-18 NOTE — PROGRESS NOTES
Na Davis is a 57 y.o. male patient.     Current Facility-Administered Medications   Medication Dose Route Frequency Provider Last Rate Last Dose    sodium chloride tablet 1 g  1 g Oral TID  Mariza Maharaj MD   1 g at 06/17/18 1436    carvedilol (COREG) tablet 6.25 mg  6.25 mg Oral BID  Mariza Maharaj MD        lisinopril (PRINIVIL;ZESTRIL) tablet 2.5 mg  2.5 mg Oral Daily Mariza Maharaj MD   2.5 mg at 06/17/18 1436    pantoprazole (PROTONIX) tablet 40 mg  40 mg Oral BID AC Edith Glaze, APRN - CNP   40 mg at 06/18/18 2628    aspirin chewable tablet 81 mg  81 mg Oral Daily Colby eMyer MD   81 mg at 06/17/18 1051    sterile water injection 1.2 mL  1.2 mL Intramuscular Once Kanwal Teenagbala, DO        haloperidol lactate (HALDOL) injection 2 mg  2 mg Intravenous BID PRN Vipul Kong MD   2 mg at 06/15/18 2311    multivitamin 1 tablet  1 tablet Oral Daily Mariza Maharaj MD   1 tablet at 06/17/18 1051    LORazepam (ATIVAN) tablet 1 mg  1 mg Oral Q1H PRN Kanwal Udeagbala, DO   1 mg at 06/16/18 2029    Or    LORazepam (ATIVAN) injection 1 mg  1 mg Intravenous Q1H PRN Kanwal Udeagbala, DO   1 mg at 06/14/18 1030    Or    LORazepam (ATIVAN) tablet 2 mg  2 mg Oral Q1H PRN Kanwal Udeagbala, DO   2 mg at 06/16/18 2143    Or    LORazepam (ATIVAN) injection 2 mg  2 mg Intravenous Q1H PRN Kanwal Udeagbala, DO   2 mg at 06/17/18 0159    Or    LORazepam (ATIVAN) tablet 3 mg  3 mg Oral Q1H PRN Kanwal Udeagbala, DO        Or    LORazepam (ATIVAN) injection 3 mg  3 mg Intravenous Q1H PRN Kanwal Udeagbala, DO   3 mg at 06/14/18 0643    Or    LORazepam (ATIVAN) tablet 4 mg  4 mg Oral Q1H PRN Kanwal Prema DO        Or    LORazepam (ATIVAN) injection 4 mg  4 mg Intravenous Q1H PRN Kanwal Prema, DO   4 mg at 16/40/67 9086    folic acid (FOLVITE) tablet 1 mg  1 mg Oral Daily Jeoffrey Brunner, MD   1 mg at 06/17/18 1051    vitamin B-1 (THIAMINE) tablet 100 mg  100 mg Oral Daily Osorio Phoenix MD   100 mg at 06/17/18 1051     Allergies   Allergen Reactions    Sulfa Antibiotics Anaphylaxis     Principal Problem:    Metabolic encephalopathy  Active Problems:    Encephalopathy acute    Tremor due to substance abuse    Altered mental status    Alcohol withdrawal delirium (HCC)    Cocaine abuse    Pancytopenia (HCC)    Hepatitis C antibody positive in blood    Alcohol abuse    Current smoker    Dehydration    Hyponatremia    Hypokalemia    Hypomagnesemia    Elevated ferritin    Cardiomyopathy (HCC)    Chronic systolic CHF (congestive heart failure) (HCC)    Physical debility    Ataxic gait    Elevated LFTs    Hypotension    SIADH (syndrome of inappropriate ADH production) ([address]  Resolved Problems:    * No resolved hospital problems. *    Blood pressure 110/75, pulse 77, temperature 96.2 °F (35.7 °C), temperature source Axillary, resp. rate 16, height 5' 8\" (1.727 m), weight 113 lb 6.4 oz (51.4 kg), SpO2 100 %. Subjective:  Symptoms:  Improved. He reports weakness and anxiety. Diet:  Adequate intake. Activity level: Returning to normal.    Pain:  He reports no pain. Objective:  General Appearance:  Comfortable and in no acute distress. Vital signs: (most recent): Blood pressure 110/75, pulse 77, temperature 96.2 °F (35.7 °C), temperature source Axillary, resp. rate 16, height 5' 8\" (1.727 m), weight 113 lb 6.4 oz (51.4 kg), SpO2 100 %. Vital signs are normal.    Output: Producing urine and producing stool. HEENT: Normal HEENT exam.    Lungs:  Normal effort and normal respiratory rate. Breath sounds clear to auscultation. Heart: Normal rate. Regular rhythm. S1 normal and S2 normal.    Abdomen: Abdomen is soft. Bowel sounds are normal.     Neurological: Patient is alert and oriented to person, place and time. Normal strength. Skin:  Warm. Assessment:  (Pancytopenia secondary ro hypersplenism.   CBC in am  Alcohol

## 2018-06-18 NOTE — PROGRESS NOTES
Gastroenterology  Progress Note    6/18/2018 6:29 PM  Subjective:   Admit Date: 6/12/2018    Interval History:  Labs reviewed, stable. Continues to have intermittent confusion, worse in the evening per the RN. No GI complaints today. Diet: DIET CARDIAC; Daily Fluid Restriction: 1200 ml    Patient Active Problem List:     Encephalopathy acute     Tremor due to substance abuse     Metabolic encephalopathy     Altered mental status     Alcohol withdrawal delirium (HCC)     Cocaine abuse     Pancytopenia (HCC)     Hepatitis C antibody positive in blood     Alcohol abuse     Current smoker     Dehydration     Hyponatremia     Hypokalemia     Hypomagnesemia     Elevated ferritin        Medications:   Scheduled Meds:   carvedilol  6.25 mg Oral BID WC    sodium chloride  1 g Oral TID WC    lisinopril  2.5 mg Oral Daily    pantoprazole  40 mg Oral BID AC    aspirin  81 mg Oral Daily    sterile water  1.2 mL Intramuscular Once    multivitamin  1 tablet Oral Daily    folic acid  1 mg Oral Daily    thiamine  100 mg Oral Daily     Continuous Infusions:  CBC:   Recent Labs      06/16/18   0501  06/18/18   0402   WBC  3.5*  3.0*   HGB  10.6*  10.7*   PLT  104*  132     BMP:    Recent Labs      06/16/18   0501  06/17/18   0412  06/18/18   0402   NA  126*  126*  127*   K  4.2  3.8  3.8   CL  92*  89*  91*   CO2  21*  21*  21*   BUN  7  7  7   CREATININE  0.6  0.6  0.5   GLUCOSE  88  107  90     Hepatic:   Recent Labs      06/18/18   0402   AST  45*   ALT  31   BILITOT  1.3*   ALKPHOS  49     INR:   Recent Labs      06/18/18   0402   INR  1.30*   Results for Dillan Stone (MRN [id number]) as of 6/14/2018 16:31   Ref. Range 6/12/2018 11:16   Ammonia Latest Ref Range: 11 - 60 umol/L 31   Results for Dillan Stone (MRN [id number]) as of 6/14/2018 16:31   Ref. Range 6/12/2018 10:55   Lipase Latest Ref Range: 5.6 - 51.3 U/L 75.7 (H)   Results for Dillan Stone (MRN [id number]) as of 6/14/2018 16:31   Ref.

## 2018-06-18 NOTE — PLAN OF CARE
Problem: Neurological  Goal: Maximum potential motor/sensory/cognitive function  Outcome: Ongoing  Alert to name only     Problem: Cardiovascular  Goal: No DVT, peripheral vascular complications  Outcome: Ongoing  No signs of DVT. No redness/warmth/swelling/pain. Will continue to monitor   Refused SCDS    Goal: Hemodynamic stability  Outcome: Ongoing  Vitals WNL     Problem: Nutrition  Goal: Optimal nutrition therapy  Outcome: Ongoing  Cardiac diet   Fluid restriction     Problem: Skin Integrity/Risk  Goal: No skin breakdown during hospitalization  Outcome: Ongoing  Turning patient every 2 hours and PRN   Bony prominences elevated with pillows  Low air loss alternating pressure relief mattress   Heels elevated with pillows   Waffle cushion     Problem: Musculor/Skeletal Functional Status  Goal: Highest potential functional level  Outcome: Ongoing  All extremities active x4    Goal: Absence of falls  Outcome: Ongoing  No falls this shift. Bed is locked and in lowest position. Pt was instructed on how to use call light and oriented to room. Call light and overhead table within reach. Will continue to monitor   PT OT     Problem: DISCHARGE BARRIERS  Goal: Patient's continuum of care needs are met  Outcome: Ongoing  precert started to [facility]     Problem: Pain Control  Goal: Maintain pain level at or below patient's acceptable level (or 5 if patient is unable to determine acceptable level)  Outcome: Ongoing  Pain Assessment: 0-10  Pain Level: 0   Pain goal:  0  Is pain goal met at this time? Yes  Pain Intervention(s): Repositioned  Additional interventions to be implemented: position change and rest      Comments: Care plan reviewed with patient and family. Patient and family verbalize understanding of the plan of care and contribute to goal setting.

## 2018-06-18 NOTE — PROGRESS NOTES
more alert and oriented today except to the year    Subjective:     Alert and awake and denies any complaints of chest pain or shortness of breath or palpitations, tolerating food    Medications:  Reviewed    Infusion Medications     Scheduled Medications    sodium chloride  1 g Oral TID WC    carvedilol  6.25 mg Oral BID WC    lisinopril  2.5 mg Oral Daily    pantoprazole  40 mg Oral BID AC    aspirin  81 mg Oral Daily    sterile water  1.2 mL Intramuscular Once    multivitamin  1 tablet Oral Daily    folic acid  1 mg Oral Daily    thiamine  100 mg Oral Daily     PRN Meds:       Intake/Output Summary (Last 24 hours) at 06/18/18 0808  Last data filed at 06/18/18 0749   Gross per 24 hour   Intake              400 ml   Output              100 ml   Net              300 ml       Diet:  DIET CARDIAC; Daily Fluid Restriction: 1200 ml    Exam:  /75   Pulse 77   Temp 96.2 °F (35.7 °C) (Axillary)   Resp 16   Ht 5' 8\" (1.727 m)   Wt 113 lb 6.4 oz (51.4 kg)   SpO2 100%   BMI 17.24 kg/m²     Physical Examination:   General appearance - alert and awake, pleasantly confused about the year   HEENT: Normocephalic, Atraumatic, pupils reactive, mucous membranes moist  Chest - moving equally to respiration,symmetric air entry, clear to auscultation  Heart - normal rate, regular rhythm, normal S1, S2, no murmur  Abdomen - soft, nontender, nondistended, no masses or organomegaly, BS+  Neurological - more awake and confused about  Time, able to move extremities ,very mild tremors still present, generalized weakness  Extremities - peripheral pulses normal, no pedal edema,  Capillary refill less than 3 sec  Skin - normal coloration and turgor, no rashes   bottom of feet-  fibromoas         Labs:   Recent Labs      06/16/18   0501  06/18/18   0402   WBC  3.5*  3.0*   HGB  10.6*  10.7*   HCT  30.9*  31.6*   PLT  104*  132     Recent Labs      06/16/18   0501  06/17/18   0412  06/18/18   0402   NA  126*  126*  127*   K report electronically signed by Dr. Aurora Hidalgo on 6/13/2018 11:15 AM      XR FOOT LEFT (MIN 3 VIEWS)   Final Result   1. There is irregularity of the soft tissues along the plantar aspect of the foot which could represent the palpable area of clinical concern and could possibly be related to plantar fibromatosis. MRI of the foot without and with intravenous contrast    could be performed for additional evaluation. **This report has been created using voice recognition software. It may contain minor errors which are inherent in voice recognition technology. **      Final report electronically signed by Dr. Aurora Hidalgo on 6/13/2018 10:52 AM      US LIVER   Final Result   Liver ultrasound was essentially normal. Incidental note is made of cholelithiasis and gallbladder sludge. **This report has been created using voice recognition software. It may contain minor errors which are inherent in voice recognition technology. **      Final report electronically signed by Dr. Georgie Bradley on 6/12/2018 4:03 PM      CT HEAD WO CONTRAST   Final Result   1. No mass effect or acute hemorrhage. 2. Chronic periventricular small vessel ischemic changes and cerebral atrophy. **This report has been created using voice recognition software. It may contain minor errors which are inherent in voice recognition technology. **      Final report electronically signed by Dr. Rebecca Andrews on 6/12/2018 11:15 AM      XR CHEST STANDARD (2 VW)   Final Result      No acute intrathoracic process. **This report has been created using voice recognition software. It may contain minor errors which are inherent in voice recognition technology. **      Final report electronically signed by Dr. Rebecca Andrews on 6/12/2018 11:10 AM          Diet: DIET CARDIAC; Daily Fluid Restriction: 1200 ml    DVT prophylaxis: [] Lovenox                                 [x] SCDs                                 [] SQ

## 2018-06-18 NOTE — PROGRESS NOTES
goals  Time Frame for Long term goals : not set due to short ELOS            AM-PAC Inpatient Mobility without Stair Climbing Raw Score : 16  AM-PAC Inpatient without Stair Climbing T-Scale Score : 45.54  Mobility Inpatient CMS 0-100% Score: 40.64  Mobility Inpatient without Stair CMS G-Code Modifier : CK

## 2018-06-19 VITALS
TEMPERATURE: 98.6 F | RESPIRATION RATE: 16 BRPM | HEART RATE: 80 BPM | WEIGHT: 120.9 LBS | OXYGEN SATURATION: 99 % | SYSTOLIC BLOOD PRESSURE: 123 MMHG | BODY MASS INDEX: 18.32 KG/M2 | HEIGHT: 68 IN | DIASTOLIC BLOOD PRESSURE: 74 MMHG

## 2018-06-19 LAB
ANION GAP SERPL CALCULATED.3IONS-SCNC: 14 MEQ/L (ref 8–16)
BUN BLDV-MCNC: 11 MG/DL (ref 7–22)
CALCIUM SERPL-MCNC: 8.8 MG/DL (ref 8.5–10.5)
CHLORIDE BLD-SCNC: 95 MEQ/L (ref 98–111)
CO2: 21 MEQ/L (ref 23–33)
CREAT SERPL-MCNC: 0.7 MG/DL (ref 0.4–1.2)
GFR SERPL CREATININE-BSD FRML MDRD: > 90 ML/MIN/1.73M2
GLUCOSE BLD-MCNC: 140 MG/DL (ref 70–108)
OSMOLALITY URINE: 661 MOSMOL/KG (ref 250–750)
OSMOLALITY: 272 MOSMOL/KG (ref 275–295)
POTASSIUM SERPL-SCNC: 3.9 MEQ/L (ref 3.5–5.2)
SODIUM BLD-SCNC: 130 MEQ/L (ref 135–145)
SODIUM URINE: 34 MEQ/L

## 2018-06-19 PROCEDURE — 80048 BASIC METABOLIC PNL TOTAL CA: CPT

## 2018-06-19 PROCEDURE — 99239 HOSP IP/OBS DSCHRG MGMT >30: CPT | Performed by: INTERNAL MEDICINE

## 2018-06-19 PROCEDURE — 6370000000 HC RX 637 (ALT 250 FOR IP): Performed by: INTERNAL MEDICINE

## 2018-06-19 PROCEDURE — 97530 THERAPEUTIC ACTIVITIES: CPT

## 2018-06-19 PROCEDURE — G8988 SELF CARE GOAL STATUS: HCPCS

## 2018-06-19 PROCEDURE — 6370000000 HC RX 637 (ALT 250 FOR IP): Performed by: PSYCHIATRY & NEUROLOGY

## 2018-06-19 PROCEDURE — 36415 COLL VENOUS BLD VENIPUNCTURE: CPT

## 2018-06-19 PROCEDURE — 83930 ASSAY OF BLOOD OSMOLALITY: CPT

## 2018-06-19 PROCEDURE — 6370000000 HC RX 637 (ALT 250 FOR IP): Performed by: NURSE PRACTITIONER

## 2018-06-19 PROCEDURE — 97166 OT EVAL MOD COMPLEX 45 MIN: CPT

## 2018-06-19 PROCEDURE — 84300 ASSAY OF URINE SODIUM: CPT

## 2018-06-19 PROCEDURE — 83935 ASSAY OF URINE OSMOLALITY: CPT

## 2018-06-19 PROCEDURE — G8987 SELF CARE CURRENT STATUS: HCPCS

## 2018-06-19 PROCEDURE — 99231 SBSQ HOSP IP/OBS SF/LOW 25: CPT | Performed by: PSYCHIATRY & NEUROLOGY

## 2018-06-19 PROCEDURE — 6370000000 HC RX 637 (ALT 250 FOR IP): Performed by: FAMILY MEDICINE

## 2018-06-19 RX ORDER — LANOLIN ALCOHOL/MO/W.PET/CERES
100 CREAM (GRAM) TOPICAL DAILY
Qty: 30 TABLET | Refills: 3 | DISCHARGE
Start: 2018-06-20

## 2018-06-19 RX ORDER — SODIUM CHLORIDE 1000 MG
1 TABLET, SOLUBLE MISCELLANEOUS
Qty: 21 TABLET | Refills: 0 | DISCHARGE
Start: 2018-06-20 | End: 2018-06-27

## 2018-06-19 RX ORDER — ASPIRIN 81 MG/1
81 TABLET, CHEWABLE ORAL DAILY
Qty: 30 TABLET | Refills: 3 | DISCHARGE
Start: 2018-06-20

## 2018-06-19 RX ORDER — MULTIVITAMIN WITH FOLIC ACID 400 MCG
1 TABLET ORAL DAILY
Refills: 0 | DISCHARGE
Start: 2018-06-20

## 2018-06-19 RX ORDER — FOLIC ACID 1 MG/1
1 TABLET ORAL DAILY
Qty: 30 TABLET | Refills: 3 | DISCHARGE
Start: 2018-06-20

## 2018-06-19 RX ORDER — LISINOPRIL 2.5 MG/1
2.5 TABLET ORAL DAILY
Qty: 30 TABLET | Refills: 3 | DISCHARGE
Start: 2018-06-20

## 2018-06-19 RX ORDER — PANTOPRAZOLE SODIUM 40 MG/1
40 TABLET, DELAYED RELEASE ORAL
Qty: 30 TABLET | Refills: 3 | DISCHARGE
Start: 2018-06-20

## 2018-06-19 RX ORDER — CARVEDILOL 6.25 MG/1
6.25 TABLET ORAL 2 TIMES DAILY WITH MEALS
Qty: 60 TABLET | Refills: 3 | DISCHARGE
Start: 2018-06-20

## 2018-06-19 RX ADMIN — PANTOPRAZOLE SODIUM 40 MG: 40 TABLET, DELAYED RELEASE ORAL at 09:53

## 2018-06-19 RX ADMIN — ASPIRIN 81 MG 81 MG: 81 TABLET ORAL at 09:55

## 2018-06-19 RX ADMIN — Medication 100 MG: at 09:53

## 2018-06-19 RX ADMIN — FOLIC ACID 1 MG: 1 TABLET ORAL at 09:53

## 2018-06-19 RX ADMIN — CARVEDILOL 6.25 MG: 6.25 TABLET, FILM COATED ORAL at 09:53

## 2018-06-19 RX ADMIN — SODIUM CHLORIDE TAB 1 GM 1 G: 1 TAB at 16:32

## 2018-06-19 RX ADMIN — SODIUM CHLORIDE TAB 1 GM 1 G: 1 TAB at 09:53

## 2018-06-19 RX ADMIN — CARVEDILOL 6.25 MG: 6.25 TABLET, FILM COATED ORAL at 16:32

## 2018-06-19 RX ADMIN — THERA TABS 1 TABLET: TAB at 09:53

## 2018-06-19 RX ADMIN — PANTOPRAZOLE SODIUM 40 MG: 40 TABLET, DELAYED RELEASE ORAL at 16:34

## 2018-06-19 RX ADMIN — SODIUM CHLORIDE TAB 1 GM 1 G: 1 TAB at 13:07

## 2018-06-19 NOTE — PROGRESS NOTES
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 6/15-MRI report reviewed-ventricular system more prominent, recommended to rule out normal pressure hydrocephalus And also restricted diffusion noted along the genuine splenium of the corpus callosum which may correspond to callosal infarct versus demyelination versus Marchiafava-Bignami disease - with chronic alcohol abuse  - still very tremulous, mental status little better today, able to talk , confused about dates, most likely going through alcohol withdrawal,  - appreciate cardiology input, recommended ischemic workup, once more stable, CIWA scale and ativan,   -will workup for hyponatremia and sodium is persistently low, serum osmolality, urine osmolality, uric acid and urine sodium, potassium and magnesium is better    6/16- sodium dropping to 126, SIADH, will place on fluid restriction, still confused, little better than yesterday, tremulous, improving, appreciate neurosurgery input, plan to workup as outpatient once the alcohol withdrawal episode resolves, cardiology planning for stress test today, nothing by mouth currently    6/17- increase Coreg dose, add low-dose lisinopril, add 1 gram salt tablet TID, stress test done today, more alert, tremors improved, confused about time, encourage activity ? Home vs SNF    6/18- plan for ECF, sodium improving slowly, discussed with the patient's sister at the bedside, stated that the other brother does cocaine and the patient probably did cocaine too.   She has difficulty at home as she is taking care of her mother, plan for ECF, counseled the patient about need to abstain from drinking and doing drugs and smoking, on 6/15/2018 9:38 AM      XR FOOT RIGHT (MIN 3 VIEWS)   Final Result   1. There is no acute process. 2. Chronic findings as described in the body the report. 3. MRI of the foot with intravenous contrast could be performed to further evaluate for a soft tissue mass such as plantar fibromatosis if this is a continued clinical concern. **This report has been created using voice recognition software. It may contain minor errors which are inherent in voice recognition technology. **      Final report electronically signed by Dr. Yanira Lees on 6/13/2018 11:15 AM      XR FOOT LEFT (MIN 3 VIEWS)   Final Result   1. There is irregularity of the soft tissues along the plantar aspect of the foot which could represent the palpable area of clinical concern and could possibly be related to plantar fibromatosis. MRI of the foot without and with intravenous contrast    could be performed for additional evaluation. **This report has been created using voice recognition software. It may contain minor errors which are inherent in voice recognition technology. **      Final report electronically signed by Dr. Yanira Lees on 6/13/2018 10:52 AM      US LIVER   Final Result   Liver ultrasound was essentially normal. Incidental note is made of cholelithiasis and gallbladder sludge. **This report has been created using voice recognition software. It may contain minor errors which are inherent in voice recognition technology. **      Final report electronically signed by Dr. Mohini Stacy on 6/12/2018 4:03 PM      CT HEAD WO CONTRAST   Final Result   1. No mass effect or acute hemorrhage. 2. Chronic periventricular small vessel ischemic changes and cerebral atrophy. **This report has been created using voice recognition software. It may contain minor errors which are inherent in voice recognition technology. **      Final report electronically signed by Dr. Tessa Harris on 6/12/2018 11:15

## 2018-06-19 NOTE — DISCHARGE SUMMARY
1.3, lipase 75, WBC 2.4, hemoglobin 10.3, platelets 59, neutophils- 33.8%   Admitted to the hospital and was noticed to be dehydrated and was given IV fluids and electrolytes were replaced as the potassium. Urine drug screen was positive for cocaine. Admits to drinking 4 beers almost on a daily basis, smokes half a pack cigarettes per day and admits to smoking. Patient denied cocaine use initially. Ferritin was significantly elevated around 1900 and GI was consulted to rule out hemochromatosis. Hepatitis C antibodies were positive. Patient initially had pancytopenia and hematology was consulted and it was felt to be most likely from alcohol and alcohol liver disease. Patient was also having abnormal gait from the bony tumors at the bottom of the feet and x-rays were done to confirm tumors or growths/ for further evaluation and MRI was recommended and I recommended the patient to follow with PCP and get further workup. Patient also admitted to having stroke in the past and was also taking medications but ran out of them and has not been on them for months. There is high clinical suspicion for COPD. Overnight while in the hospital he became very combative and confused and was trying to burn the IV tubing with a lighter( not sure how he got it). He was also very tremulous and was suspected to have alcohol withdrawal and was started on Ativan CIWA scale. With hydration sodium was dropping  To 126, after initial improvement. 2-D echo showed ejection fraction of 35-40% and cardiology was consulted. Over the next 3 days patient continued to have withdrawal symptoms and subsequently mental status improved little along with her tremulousness. Psychiatrist was also consulted for his behavior.   He had MRI of the brain which showed prominent ventricles and recommended to rule out normal pressure hydrocephalus and And also restricted diffusion noted along the genuine splenium of the corpus callosum which may

## 2018-06-19 NOTE — PROGRESS NOTES
continued therapy after discharge    Clinical Decision Making: Clinical Decision making was of Moderate Complexity as the result of analysis of data from a detailed assessment, a consideration of several treatment options, the presence of comorbidities affecting the plan of care and the need for minimal to moderate modifications or assistance required to complete the evaluation. Patient Education:  Patient Education: OT POC, OT Role, Transfer safety, Walker safety, Re-orientation, d/c recommendation. Barriers to Learning: Confusion     Equipment Recommendations:  Equipment Needed: No  Other: Defer to next level of care. Safety:  Safety Devices in place: Yes  Type of devices: All fall risk precautions in place, Call light within reach, Bed alarm in place, Gait belt, Patient at risk for falls, Left in bed, Nurse notified (Telesitter present )    Plan:  Times per week: 3-5x  Current Treatment Recommendations: Strengthening, Functional Mobility Training, Cognitive Reorientation, Patient/Caregiver Education & Training, Equipment Evaluation, Education, & procurement, Home Management Training, Safety Education & Training, Self-Care / ADL, Endurance Training    Goals:  Patient goals : Go Home    Short term goals  Time Frame for Short term goals: 2 weeks   Short term goal 1: Pt will complete BUE light resistive exercises with min vc for technique to Increase indep and endurance with all self cares and transfers. Short term goal 2: Pt will complete standing tolerance x 4 minutes with CGA and 1 UE release to increase indep and endurance with groomninbg tasks. Short term goal 3: Pt will complete functional mobility to/from BR and HH distances with CGA and min vc for safety to increase indep with toileting. Short term goal 4: Pt will complete LB dressing with min A and min vc for safety to increase indep within home environment.    Long term goals  Time Frame for Long term goals : No LTGs d/t short estimated length

## 2018-06-19 NOTE — PLAN OF CARE
Problem: Neurological  Goal: Maximum potential motor/sensory/cognitive function  Outcome: Ongoing  Alert to name only   Confused at times     Problem: Cardiovascular  Goal: No DVT, peripheral vascular complications  Outcome: Ongoing    Goal: Hemodynamic stability  Outcome: Ongoing  Vitals stable     Problem: Nutrition  Goal: Optimal nutrition therapy  Outcome: Ongoing  Cardiac diet 1500 ml fluid restriction     Problem: Skin Integrity/Risk  Goal: No skin breakdown during hospitalization  Outcome: Ongoing  Patient repositions self often   Bony prominences elevated with pillows  Low air loss alternating pressure relief mattress elevated  Heels elevated with pillows       Problem: Musculor/Skeletal Functional Status  Goal: Highest potential functional level  Outcome: Ongoing  Active x4   Goal: Absence of falls  Outcome: Ongoing  No falls this shift. Bed is locked and in lowest position. Pt was instructed on how to use call light and oriented to room. Call light and overhead table within reach. Will continue to monitor  Bed alarm on     Problem: DISCHARGE BARRIERS  Goal: Patient's continuum of care needs are met  Outcome: Ongoing  Plans to discharge to [facility]     Problem: Pain Control  Goal: Maintain pain level at or below patient's acceptable level (or 5 if patient is unable to determine acceptable level)  Outcome: Ongoing  Pain Assessment: 0-10  Pain Level: 0   Pain goal:  0  Is pain goal met at this time? Yes  Pain Intervention(s): Repositioned  Additional interventions to be implemented: position change and rest      Problem:   Goal: Adequate urinary output  Outcome: Ongoing  Inputs and outputs q8hrs     Comments: Care plan reviewed with patient and family. Patient and family verbalize understanding of the plan of care and contribute to goal setting.

## 2018-06-19 NOTE — CARE COORDINATION
6/18/18, 1:53 PM    DISCHARGE BARRIERS      SW received a call from his sister, Ashvin Mcfadden. She states she can't care for her brother as she now takes care of her mother due to her Alzheimer's and has not been working since 2014 when she came from [place] to do this. She states her brother, Madai Nino, can't take care of the patient, does not have the patience for this. She suggested [facility] where her family works. As far as she knows, no on is his POA but she suggested his girlfriend may be but she is [place] and sent him to here to be with his family. SW made this referral, spoke to [facility].   They will review and let SW know, he is a pre-cert
6/19/18, 8:12 AM    DISCHARGE BARRIERS      Pasrr completed.
he was feeling/thinking. SW told patient that he could not return to his brother's home and he states SW was told a lie. He states he has some money and that he would get his own place. SW advised that if he is unable to get up and around on his own this was not a safe plan. He states he has a wheelchair. SW questioned if he brought this with him on the plane and he states that this was left behind. He made mention that he has had a lot of troubles thru his life. Collabrative List of ECF/HH were provided: not at this time    Teach Back Method used with patient regarding care plan and needs  Patient verbalize understanding of the plan of care and contribute to goal setting. Anticipated Needs/Discharge Plan:  Patient indicated that he will be able to go home to his brother's home although the ED note states his brother can't care for him as he takes care of their mother. Patient states, when questioned, that his mother lives around the corner from his brother. He states his brother is retired from [employer]. SW will continue to work with patient on his plan, will speak to brother and him together when he comes to visit. Await therapy notes.     Electronically signed by AARON Esparza on 6/13/2018 at 10:21 AM

## 2018-06-19 NOTE — CONSULTS
Department of Psychiatry  Consult Service: Follow up   Psychiatric Assessment      Thank you very much for allowing us to participate in the care of this patient. Reason for Consult:  Bizarre behavior    HISTORY OF PRESENT ILLNESS:  The patient is a 57 y.o. male without any known psychiatric history who is admitted medically with AMS. He was confused on admission assessment per MD, poor historian. He did acknowledge this, attributed to short term memory loss and dizziness. He was hyponatremic at 126meq/L, K+ has been fluctuating, head CT = chronic small vessel ischemic changes and cerebral atrophy. Other findings include hyperbilirubinemia (? h/o HepC), pancytopenia, hypomagnesmia. Tox +cocaine, BAL negative. His brother was present and provided history. He re reported that pt was confused last Friday when he picked him up from the airport (flew in from [facility]). Over the last 3 days he has become more combative and barely moving out of bed, brother cannot care for him like this. No reports of head trauma, focal weaknesses, incontinence. Denies any past mental health treatment. Sheryle Pitcher continues with confusion while on medical unit; earlier tried to burn his IV tubing with a lighter that he had found. He has been getting Ativan per CIWA, most recent reading at 10:30 was a 9, received 1mg Ativan for this. MAR indicates that, since 2237 yesterday, he has received 15mg total of Ativan as well as  Geodon 10mg IM for agitation   He is somnolent on exam, unable to remain awake long enough to provide any meaningful history. He orients to self, location is \"school\". He maintains that he drinks \"the standard amount\" of alcohol, \"a beer and a cigarette in the morning to start the day\", then 3 more beers throughout the day +/- whiskey. Further history is pending subsequent contact with brother. Progress:  Patient was seen and discussed with the RN on the unit.   He is reported to be doing well

## 2018-06-19 NOTE — PROGRESS NOTES
Harsh Llanos is a 57 y.o. male patient.     Current Facility-Administered Medications   Medication Dose Route Frequency Provider Last Rate Last Dose    carvedilol (COREG) tablet 6.25 mg  6.25 mg Oral BID  Lia MORRIS Shirley CNP   6.25 mg at 06/18/18 1728    sodium chloride tablet 1 g  1 g Oral TID  Mariza Bhardwaj MD   1 g at 06/18/18 1728    lisinopril (PRINIVIL;ZESTRIL) tablet 2.5 mg  2.5 mg Oral Daily Mariza Bhardwaj MD   2.5 mg at 06/18/18 0920    pantoprazole (PROTONIX) tablet 40 mg  40 mg Oral BID  MORRIS Mathis CNP   40 mg at 06/18/18 1728    aspirin chewable tablet 81 mg  81 mg Oral Daily Melanie Sultana MD   81 mg at 06/18/18 1661    sterile water injection 1.2 mL  1.2 mL Intramuscular Once Kanwal Udadalgisagbala, DO        haloperidol lactate (HALDOL) injection 2 mg  2 mg Intravenous BID PRN Avtar Mcelroy MD   2 mg at 06/15/18 2311    multivitamin 1 tablet  1 tablet Oral Daily Mariza Bhardwaj MD   1 tablet at 06/18/18 0920    LORazepam (ATIVAN) tablet 1 mg  1 mg Oral Q1H PRN Kanwal Udeagbala, DO   1 mg at 06/16/18 2029    Or    LORazepam (ATIVAN) injection 1 mg  1 mg Intravenous Q1H PRN Kanwal Udeagbala, DO   1 mg at 06/14/18 1030    Or    LORazepam (ATIVAN) tablet 2 mg  2 mg Oral Q1H PRN Kanwal Udeagbala, DO   2 mg at 06/16/18 2143    Or    LORazepam (ATIVAN) injection 2 mg  2 mg Intravenous Q1H PRN Kanwal Udeagbala, DO   2 mg at 06/17/18 0159    Or    LORazepam (ATIVAN) tablet 3 mg  3 mg Oral Q1H PRN Kanwal Udeagbala, DO        Or    LORazepam (ATIVAN) injection 3 mg  3 mg Intravenous Q1H PRN Kanwal Udeagbala, DO   3 mg at 06/14/18 0643    Or    LORazepam (ATIVAN) tablet 4 mg  4 mg Oral Q1H PRN Kanwal Udeagbala, DO        Or    LORazepam (ATIVAN) injection 4 mg  4 mg Intravenous Q1H PRN Kanwal Lloyd DO   4 mg at 41/43/99 4027    folic acid (FOLVITE) tablet 1 mg  1 mg Oral Daily Lety Schuler MD   1 mg at 06/18/18

## 2018-06-19 NOTE — PROGRESS NOTES
1937: Patient discharge to [facility]. [facility] transported patient and all his belonging.

## 2018-07-17 ENCOUNTER — OFFICE VISIT (OUTPATIENT)
Dept: NEUROSURGERY | Age: 63
End: 2018-07-17
Payer: MEDICARE

## 2018-07-17 VITALS
WEIGHT: 134.6 LBS | DIASTOLIC BLOOD PRESSURE: 74 MMHG | HEIGHT: 68 IN | BODY MASS INDEX: 20.4 KG/M2 | SYSTOLIC BLOOD PRESSURE: 109 MMHG | HEART RATE: 78 BPM

## 2018-07-17 DIAGNOSIS — R41.3 MEMORY DEFICIT: ICD-10-CM

## 2018-07-17 DIAGNOSIS — R26.9 GAIT ABNORMALITY: ICD-10-CM

## 2018-07-17 DIAGNOSIS — G91.9 HYDROCEPHALUS (HCC): Primary | ICD-10-CM

## 2018-07-17 DIAGNOSIS — R41.89 COGNITIVE CHANGE: ICD-10-CM

## 2018-07-17 PROCEDURE — 99213 OFFICE O/P EST LOW 20 MIN: CPT | Performed by: NEUROLOGICAL SURGERY

## 2018-07-17 PROCEDURE — 1111F DSCHRG MED/CURRENT MED MERGE: CPT | Performed by: NEUROLOGICAL SURGERY

## 2018-07-17 PROCEDURE — 4004F PT TOBACCO SCREEN RCVD TLK: CPT | Performed by: NEUROLOGICAL SURGERY

## 2018-07-17 PROCEDURE — G8427 DOCREV CUR MEDS BY ELIG CLIN: HCPCS | Performed by: NEUROLOGICAL SURGERY

## 2018-07-17 PROCEDURE — 3017F COLORECTAL CA SCREEN DOC REV: CPT | Performed by: NEUROLOGICAL SURGERY

## 2018-07-17 PROCEDURE — G8420 CALC BMI NORM PARAMETERS: HCPCS | Performed by: NEUROLOGICAL SURGERY

## 2018-07-17 NOTE — PROGRESS NOTES
Patient presented today in a follow up visit. I saw him initially as on in patient consultation. Patient was admitted initially on 6/12/2018 after he had presented to the Emergency Department for the evaluation of altered mental status and confusion. Patient has a strong history of alcoholism  and at that time his current medical picture was  more suggestive of alcohol withdrawal  Syndrome. However, because  patient stated  that he has developed  a balance problem recently about 1 year ago and had several  falls recently. Last fall was about 10 days ago one week ago. Also, he had brain MRI that shows  findings suggestive of normal pressure hydrocephalus. For that reason,  neurosurgery team was consulted. At that time, I recommended that patient acute alcohol withdrawal syndrome to be solved first and to follow up with neurosurgery clinic as an out patient for further evaluation of possible nirav; pressure hydrencephalus. In today visit:    Pateint came in without any family members. Patient has obvious memory issue and difficulty in concentration. It was difficult to get concert answerers from patient regarding his congnative, balance ( has slightly wide step gait in today exam) and urine control functions. - Despite patient's history of alcoholism may contribute to his current symptoms ( however,  NPH should also be kept in mind based on his brain imaging studies' Findings). However, and before considering him for NPH lumbar drain, I will request form him PT, OT and speech evaluation according our normal pressure hydrocephalus protocol. To obtain or objective data regarding  cognitive and balance impairment. After the above evaluation is competed, I will see patient again to discuss the possible next step in his treatment plan. * Time spent with the patient was 15  minutes. More than 50% was spent counseling/coordinating the patient's care.

## 2018-07-20 ENCOUNTER — TELEPHONE (OUTPATIENT)
Dept: NEUROSURGERY | Age: 63
End: 2018-07-20

## 2018-08-14 ENCOUNTER — OFFICE VISIT (OUTPATIENT)
Dept: NEUROSURGERY | Age: 63
End: 2018-08-14
Payer: COMMERCIAL

## 2018-08-14 VITALS
BODY MASS INDEX: 22.37 KG/M2 | DIASTOLIC BLOOD PRESSURE: 73 MMHG | WEIGHT: 147.6 LBS | SYSTOLIC BLOOD PRESSURE: 104 MMHG | HEART RATE: 82 BPM | HEIGHT: 68 IN

## 2018-08-14 DIAGNOSIS — R41.89 COGNITIVE DECLINE: ICD-10-CM

## 2018-08-14 DIAGNOSIS — R90.89 ABNORMAL BRAIN MRI: ICD-10-CM

## 2018-08-14 DIAGNOSIS — R26.89 BALANCE DISORDER: Primary | ICD-10-CM

## 2018-08-14 PROCEDURE — G8420 CALC BMI NORM PARAMETERS: HCPCS | Performed by: NEUROLOGICAL SURGERY

## 2018-08-14 PROCEDURE — G8427 DOCREV CUR MEDS BY ELIG CLIN: HCPCS | Performed by: NEUROLOGICAL SURGERY

## 2018-08-14 PROCEDURE — 3017F COLORECTAL CA SCREEN DOC REV: CPT | Performed by: NEUROLOGICAL SURGERY

## 2018-08-14 PROCEDURE — 99211 OFF/OP EST MAY X REQ PHY/QHP: CPT | Performed by: NEUROLOGICAL SURGERY

## 2018-08-14 RX ORDER — LORAZEPAM 1 MG/1
1 TABLET ORAL EVERY 4 HOURS PRN
COMMUNITY

## 2018-08-27 ENCOUNTER — OFFICE VISIT (OUTPATIENT)
Dept: CARDIOLOGY CLINIC | Age: 63
End: 2018-08-27
Payer: MEDICARE

## 2018-08-27 VITALS
WEIGHT: 145 LBS | BODY MASS INDEX: 21.98 KG/M2 | HEIGHT: 68 IN | DIASTOLIC BLOOD PRESSURE: 68 MMHG | SYSTOLIC BLOOD PRESSURE: 112 MMHG | HEART RATE: 72 BPM

## 2018-08-27 DIAGNOSIS — I42.0 DILATED CARDIOMYOPATHY (HCC): Primary | ICD-10-CM

## 2018-08-27 DIAGNOSIS — F14.11 HISTORY OF COCAINE ABUSE (HCC): ICD-10-CM

## 2018-08-27 DIAGNOSIS — F10.11 HISTORY OF ALCOHOL ABUSE: ICD-10-CM

## 2018-08-27 DIAGNOSIS — I50.22 CHRONIC SYSTOLIC CHF (CONGESTIVE HEART FAILURE) (HCC): ICD-10-CM

## 2018-08-27 PROCEDURE — 4004F PT TOBACCO SCREEN RCVD TLK: CPT | Performed by: PHYSICIAN ASSISTANT

## 2018-08-27 PROCEDURE — G8420 CALC BMI NORM PARAMETERS: HCPCS | Performed by: PHYSICIAN ASSISTANT

## 2018-08-27 PROCEDURE — G8427 DOCREV CUR MEDS BY ELIG CLIN: HCPCS | Performed by: PHYSICIAN ASSISTANT

## 2018-08-27 PROCEDURE — 99213 OFFICE O/P EST LOW 20 MIN: CPT | Performed by: PHYSICIAN ASSISTANT

## 2018-08-27 PROCEDURE — 3017F COLORECTAL CA SCREEN DOC REV: CPT | Performed by: PHYSICIAN ASSISTANT

## 2018-08-27 RX ORDER — POLYETHYLENE GLYCOL 3350 17 G/17G
17 POWDER ORAL DAILY
COMMUNITY

## 2018-08-27 RX ORDER — ACETAMINOPHEN 325 MG/1
650 TABLET ORAL EVERY 6 HOURS PRN
COMMUNITY

## 2018-08-27 RX ORDER — LOPERAMIDE HYDROCHLORIDE 2 MG/1
2 TABLET ORAL 4 TIMES DAILY PRN
COMMUNITY

## 2018-09-06 ENCOUNTER — TELEPHONE (OUTPATIENT)
Dept: NEUROSURGERY | Age: 63
End: 2018-09-06

## 2018-09-07 ENCOUNTER — HOSPITAL ENCOUNTER (OUTPATIENT)
Dept: MRI IMAGING | Age: 63
Discharge: HOME | End: 2018-09-07
Payer: MEDICARE

## 2018-09-07 DIAGNOSIS — R41.82 ALTERED MENTAL STATUS, UNSPECIFIED ALTERED MENTAL STATUS TYPE: ICD-10-CM

## 2018-09-07 LAB — POC CREATININE WHOLE BLOOD: 0.8 MG/DL (ref 0.5–1.2)

## 2018-09-07 PROCEDURE — 70553 MRI BRAIN STEM W/O & W/DYE: CPT

## 2018-09-07 PROCEDURE — A9579 GAD-BASE MR CONTRAST NOS,1ML: HCPCS | Performed by: PSYCHIATRY & NEUROLOGY

## 2018-09-07 PROCEDURE — 82565 ASSAY OF CREATININE: CPT

## 2018-09-07 PROCEDURE — 6360000004 HC RX CONTRAST MEDICATION: Performed by: PSYCHIATRY & NEUROLOGY

## 2018-09-07 RX ADMIN — GADOTERIDOL 10 ML: 279.3 INJECTION, SOLUTION INTRAVENOUS at 07:56

## 2018-09-08 ENCOUNTER — TELEPHONE (OUTPATIENT)
Dept: NEUROLOGY | Age: 63
End: 2018-09-08

## 2018-09-10 ENCOUNTER — OFFICE VISIT (OUTPATIENT)
Dept: NEUROLOGY | Age: 63
End: 2018-09-10
Payer: MEDICARE

## 2018-09-10 VITALS
DIASTOLIC BLOOD PRESSURE: 82 MMHG | HEIGHT: 68 IN | SYSTOLIC BLOOD PRESSURE: 120 MMHG | HEART RATE: 76 BPM | BODY MASS INDEX: 22.58 KG/M2 | WEIGHT: 149 LBS

## 2018-09-10 DIAGNOSIS — R90.82 WHITE MATTER ABNORMALITY ON MRI OF BRAIN: ICD-10-CM

## 2018-09-10 DIAGNOSIS — G93.41 METABOLIC ENCEPHALOPATHY: Primary | ICD-10-CM

## 2018-09-10 PROCEDURE — 4004F PT TOBACCO SCREEN RCVD TLK: CPT | Performed by: PSYCHIATRY & NEUROLOGY

## 2018-09-10 PROCEDURE — 3017F COLORECTAL CA SCREEN DOC REV: CPT | Performed by: PSYCHIATRY & NEUROLOGY

## 2018-09-10 PROCEDURE — G8420 CALC BMI NORM PARAMETERS: HCPCS | Performed by: PSYCHIATRY & NEUROLOGY

## 2018-09-10 PROCEDURE — 99213 OFFICE O/P EST LOW 20 MIN: CPT | Performed by: PSYCHIATRY & NEUROLOGY

## 2018-09-10 PROCEDURE — G8427 DOCREV CUR MEDS BY ELIG CLIN: HCPCS | Performed by: PSYCHIATRY & NEUROLOGY

## 2018-09-10 NOTE — PROGRESS NOTES
NEUROLOGY OUT PATIENT FOLLOW UP NOTE:  6/00/71656:61 PM    Francisco Nicholas is here for follow up for hospital follow-up for metabolic encephalopathy, acute confusional state, abnormal MRI brain. The patient reports he has been doing very well since hospital discharge. He has been on multivitamins, is being supervised with his medication intake. Denies any headache, no falls. He states he feels fine. He denies new symptoms. Repeat MRI brain was stable with diffusion abnormality on the study that can be seen with nutritional deficiency, or with NPH. He is here to go over these results. ROS:  Respiratory : no cough, no shortness of breath  Cardiac: no chest pain. No palpitations. Renal : no flank pain, no hematuria    Skin: no rash      Allergies   Allergen Reactions    Sulfa Antibiotics Anaphylaxis       Current Outpatient Prescriptions:     loperamide (IMODIUM A-D) 2 MG tablet, Take 2 mg by mouth 4 times daily as needed for Diarrhea, Disp: , Rfl:     polyethylene glycol (MIRALAX) POWD powder, Take 17 g by mouth daily, Disp: , Rfl:     acetaminophen (TYLENOL) 325 MG tablet, Take 650 mg by mouth every 6 hours as needed for Pain, Disp: , Rfl:     LORazepam (ATIVAN) 1 MG tablet, Take 1 mg by mouth every 4 hours as needed for Anxiety. ., Disp: , Rfl:     aspirin 81 MG chewable tablet, Take 1 tablet by mouth daily, Disp: 30 tablet, Rfl: 3    lisinopril (PRINIVIL;ZESTRIL) 2.5 MG tablet, Take 1 tablet by mouth daily . hold if SBP less than 100 mm hg, Disp: 30 tablet, Rfl: 3    carvedilol (COREG) 6.25 MG tablet, Take 1 tablet by mouth 2 times daily (with meals) . hold if SBP less than 90 mm hg or HR less than 50, Disp: 60 tablet, Rfl: 3    folic acid (FOLVITE) 1 MG tablet, Take 1 tablet by mouth daily, Disp: 30 tablet, Rfl: 3    Multiple Vitamin (MULTIVITAMIN) tablet, Take 1 tablet by mouth daily, Disp: , Rfl: 0    pantoprazole (PROTONIX) 40 MG tablet, Take 1 tablet by mouth 2 times daily (before meals), and ventricular system secondary to generalized, age-related parenchymal volume loss. Hyperintense T2 signal is noted diffusely involving the corpus callosum and periventricular white matter. There is also   restricted diffusion involving the genu and splenium of the corpus callosum. Susceptibility in the bilateral basal ganglia likely corresponds to age-related mineralization. The ventricles are midline however, prominent in size compared to the degree of   parenchymal volume loss. No mass, mass effect or extra-axial fluid collection is identified. The basal cisterns and visualized vascular flow voids are patent. There is absence of the native lens on the left and suggestion of a scleral buckle surrounding the left globe. The visualized temporal bone structures are unremarkable. There is a mucous retention cyst within the left maxillary sinus. Impression    There is prominence of the ventricular system which may be slightly out of proportion compared to the degree of parenchymal volume loss. Subtle hyperintense T2 signal is also noted within the periventricular white matter and clinical correlation to rule   out normal pressure hydrocephalus would be beneficial. Restricted diffusion is also noted within the genu and splenium of the corpus callosum which may correspond to callosal infarcts versus demyelination. This can also be seen in the setting of   Marchiafava-Bignami disease if there is a history of chronic alcohol abuse. Repeat exam with contrast in one month may be beneficial for further evaluation. **This report has been created using voice recognition software. It may contain minor errors which are inherent in voice recognition technology. **    Final report electronically signed by Dr. Naina Griffin on 6/15/2018 9:38 AM     Results for orders placed during the hospital encounter of 09/07/18   MRI BRAIN W WO CONTRAST    Narrative PROCEDURE: MRI BRAIN W WO CONTRAST    CLINICAL INFORMATION: involving the genu and splenium of the corpus callosum. This is nonspecific however, may correspond to demyelination versus callosal infarcts versus Marchiafava-Bignami disease if there is a history of chronic   alcohol abuse. Given the lack of enhancement, lymphoma or glioma is considered less likely. 2. There is a small, round lesion inferior to the cisternal segments of the 7th and 8th cranial nerve complexes on the left. The imaging findings are nonspecific and further evaluation with a dedicated MRI utilizing the IAC protocol is recommended to   rule out a mass lesion. 3. The ventricles are stable in size and morphology and are slightly out of proportion compared to the degree of parenchymal volume loss. Further clinical correlation to rule out normal pressure hydrocephalus would be beneficial.          **This report has been created using voice recognition software. It may contain minor errors which are inherent in voice recognition technology. **      Final report electronically signed by Dr. Eden Jackson on 9/7/2018 1:15 PM       Results for orders placed during the hospital encounter of 06/12/18   CT HEAD WO CONTRAST    Narrative PROCEDURE: CT HEAD WO CONTRAST    CLINICAL INFORMATION: Altered mental status    TECHNIQUE: CT scan of the head was performed from the vertex to the skull base without use of intravenous contrast.    All CT scans at this facility use dose modulation, iterative reconstruction, and/or weight-based dosing when appropriate to reduce radiation dose to as low as reasonably achievable. COMPARISON: None. FINDINGS: There is no mass effect, midline shift or acute hemorrhage. Ventricles and sulci are prominent. There is diffuse periventricular white matter hypoattenuation. There are surgical changes in the left orbit. Visualized paranasal sinuses and   mastoid air cells are unremarkable. Impression 1. No mass effect or acute hemorrhage.   2. Chronic periventricular small

## 2019-01-23 ENCOUNTER — HOSPITAL ENCOUNTER (OUTPATIENT)
Age: 64
Setting detail: SPECIMEN
Discharge: HOME | End: 2019-01-23
Payer: COMMERCIAL

## 2019-01-23 LAB
ABSOLUTE EOS #: 0.08 K/UL (ref 0–0.44)
ABSOLUTE IMMATURE GRANULOCYTE: <0.03 K/UL (ref 0–0.3)
ABSOLUTE LYMPH #: 2.42 K/UL (ref 1.1–3.7)
ABSOLUTE MONO #: 0.6 K/UL (ref 0.1–1.2)
ALBUMIN SERPL-MCNC: 4 G/DL (ref 3.5–5.2)
ALBUMIN/GLOBULIN RATIO: 1.1 (ref 1–2.5)
ALP BLD-CCNC: 80 U/L (ref 40–129)
ALT SERPL-CCNC: 21 U/L (ref 5–41)
ANION GAP SERPL CALCULATED.3IONS-SCNC: 18 MMOL/L (ref 9–17)
AST SERPL-CCNC: 35 U/L
BASOPHILS # BLD: 1 % (ref 0–2)
BASOPHILS ABSOLUTE: 0.03 K/UL (ref 0–0.2)
BILIRUB SERPL-MCNC: 0.75 MG/DL (ref 0.3–1.2)
BUN BLDV-MCNC: 10 MG/DL (ref 8–23)
BUN/CREAT BLD: ABNORMAL (ref 9–20)
CALCIUM SERPL-MCNC: 9.3 MG/DL (ref 8.6–10.4)
CHLORIDE BLD-SCNC: 97 MMOL/L (ref 98–107)
CHOLESTEROL/HDL RATIO: 2.9
CHOLESTEROL: 112 MG/DL
CO2: 25 MMOL/L (ref 20–31)
CREAT SERPL-MCNC: 0.82 MG/DL (ref 0.7–1.2)
DIFFERENTIAL TYPE: ABNORMAL
EOSINOPHILS RELATIVE PERCENT: 2 % (ref 1–4)
GFR AFRICAN AMERICAN: >60 ML/MIN
GFR NON-AFRICAN AMERICAN: >60 ML/MIN
GFR SERPL CREATININE-BSD FRML MDRD: ABNORMAL ML/MIN/{1.73_M2}
GFR SERPL CREATININE-BSD FRML MDRD: ABNORMAL ML/MIN/{1.73_M2}
GLUCOSE BLD-MCNC: 90 MG/DL (ref 70–99)
HCT VFR BLD CALC: 42.7 % (ref 40.7–50.3)
HDLC SERPL-MCNC: 39 MG/DL
HEMOGLOBIN: 13.9 G/DL (ref 13–17)
IMMATURE GRANULOCYTES: 0 %
IRON: 119 UG/DL (ref 59–158)
LDL CHOLESTEROL: 61 MG/DL (ref 0–130)
LYMPHOCYTES # BLD: 54 % (ref 24–43)
MCH RBC QN AUTO: 35.1 PG (ref 25.2–33.5)
MCHC RBC AUTO-ENTMCNC: 32.6 G/DL (ref 28.4–34.8)
MCV RBC AUTO: 107.8 FL (ref 82.6–102.9)
MONOCYTES # BLD: 14 % (ref 3–12)
NRBC AUTOMATED: 0 PER 100 WBC
PDW BLD-RTO: 13.3 % (ref 11.8–14.4)
PLATELET # BLD: 166 K/UL (ref 138–453)
PLATELET ESTIMATE: ABNORMAL
PMV BLD AUTO: 12.4 FL (ref 8.1–13.5)
POTASSIUM SERPL-SCNC: 4.5 MMOL/L (ref 3.7–5.3)
PROSTATE SPECIFIC ANTIGEN: 1.14 UG/L
RBC # BLD: 3.96 M/UL (ref 4.21–5.77)
RBC # BLD: ABNORMAL 10*6/UL
SEG NEUTROPHILS: 29 % (ref 36–65)
SEGMENTED NEUTROPHILS ABSOLUTE COUNT: 1.27 K/UL (ref 1.5–8.1)
SODIUM BLD-SCNC: 140 MMOL/L (ref 135–144)
TOTAL PROTEIN: 7.6 G/DL (ref 6.4–8.3)
TRIGL SERPL-MCNC: 60 MG/DL
TSH SERPL DL<=0.05 MIU/L-ACNC: 1.4 MIU/L (ref 0.3–5)
VLDLC SERPL CALC-MCNC: ABNORMAL MG/DL (ref 1–30)
WBC # BLD: 4.4 K/UL (ref 3.5–11.3)
WBC # BLD: ABNORMAL 10*3/UL

## 2022-12-13 ENCOUNTER — HOSPITAL ENCOUNTER (EMERGENCY)
Age: 67
Discharge: HOME | End: 2022-12-13
Attending: FAMILY MEDICINE
Payer: MEDICARE

## 2022-12-13 ENCOUNTER — APPOINTMENT (OUTPATIENT)
Dept: GENERAL RADIOLOGY | Age: 67
End: 2022-12-13
Payer: MEDICARE

## 2022-12-13 VITALS
SYSTOLIC BLOOD PRESSURE: 146 MMHG | WEIGHT: 150 LBS | DIASTOLIC BLOOD PRESSURE: 92 MMHG | HEART RATE: 91 BPM | BODY MASS INDEX: 22.81 KG/M2 | TEMPERATURE: 99.5 F | RESPIRATION RATE: 16 BRPM | OXYGEN SATURATION: 98 %

## 2022-12-13 DIAGNOSIS — U07.1 COVID-19: Primary | ICD-10-CM

## 2022-12-13 LAB
INFLUENZA A: NOT DETECTED
INFLUENZA B: NOT DETECTED
SARS-COV-2 RNA, RT PCR: DETECTED

## 2022-12-13 PROCEDURE — 6360000002 HC RX W HCPCS: Performed by: STUDENT IN AN ORGANIZED HEALTH CARE EDUCATION/TRAINING PROGRAM

## 2022-12-13 PROCEDURE — 71046 X-RAY EXAM CHEST 2 VIEWS: CPT

## 2022-12-13 PROCEDURE — 6370000000 HC RX 637 (ALT 250 FOR IP): Performed by: STUDENT IN AN ORGANIZED HEALTH CARE EDUCATION/TRAINING PROGRAM

## 2022-12-13 PROCEDURE — 96372 THER/PROPH/DIAG INJ SC/IM: CPT

## 2022-12-13 PROCEDURE — 99285 EMERGENCY DEPT VISIT HI MDM: CPT

## 2022-12-13 PROCEDURE — 87636 SARSCOV2 & INF A&B AMP PRB: CPT

## 2022-12-13 PROCEDURE — 93005 ELECTROCARDIOGRAM TRACING: CPT | Performed by: FAMILY MEDICINE

## 2022-12-13 RX ORDER — KETOROLAC TROMETHAMINE 30 MG/ML
15 INJECTION, SOLUTION INTRAMUSCULAR; INTRAVENOUS ONCE
Status: COMPLETED | OUTPATIENT
Start: 2022-12-13 | End: 2022-12-13

## 2022-12-13 RX ORDER — ACETAMINOPHEN 325 MG/1
650 TABLET ORAL ONCE
Status: COMPLETED | OUTPATIENT
Start: 2022-12-13 | End: 2022-12-13

## 2022-12-13 RX ADMIN — ACETAMINOPHEN 650 MG: 325 TABLET ORAL at 15:19

## 2022-12-13 RX ADMIN — KETOROLAC TROMETHAMINE 15 MG: 30 INJECTION, SOLUTION INTRAMUSCULAR; INTRAVENOUS at 15:19

## 2022-12-13 ASSESSMENT — PAIN DESCRIPTION - FREQUENCY: FREQUENCY: CONTINUOUS

## 2022-12-13 ASSESSMENT — ENCOUNTER SYMPTOMS
NAUSEA: 0
ABDOMINAL PAIN: 0
BACK PAIN: 0
TROUBLE SWALLOWING: 0
DIARRHEA: 0
COUGH: 1
SHORTNESS OF BREATH: 0
PHOTOPHOBIA: 0
SORE THROAT: 0
VOMITING: 0

## 2022-12-13 ASSESSMENT — PAIN - FUNCTIONAL ASSESSMENT: PAIN_FUNCTIONAL_ASSESSMENT: 0-10

## 2022-12-13 ASSESSMENT — PAIN DESCRIPTION - DESCRIPTORS: DESCRIPTORS: ACHING

## 2022-12-13 ASSESSMENT — PAIN DESCRIPTION - PAIN TYPE: TYPE: ACUTE PAIN

## 2022-12-13 ASSESSMENT — PAIN DESCRIPTION - LOCATION: LOCATION: GENERALIZED

## 2022-12-13 ASSESSMENT — PAIN SCALES - GENERAL: PAINLEVEL_OUTOF10: 9

## 2022-12-13 NOTE — ED PROVIDER NOTES
[place] ENCOUNTER          Pt Name: Roro Montemayor  MRN: [id number]  [place] 1955  Date of evaluation: 12/13/2022  Treating Resident Physician: Velma Jacobson MD  Supervising Physician: Ilan Angeles MD      [address] [place] [address]       Chief Complaint   Patient presents with    Generalized Body Aches    Nasal Congestion     History obtained from the patient. HISTORY OF PRESENT ILLNESS    HPI  Roro Montemayor is a 74 y.o. male with PMHx of asthma here CVA and CKD who presents to the emergency department for evaluation of cough, shortness of breath and chills X 2 days. Reports nonproductive cough. Denies chest pain, leg swelling, abdominal pain. Patient denies exacerbating or alleviating factors. States that he is homeless. The patient has no other acute complaints at this time. REVIEW OF SYSTEMS   Review of Systems   Constitutional:  Positive for chills and fatigue. Negative for fever. HENT:  Negative for sore throat and trouble swallowing. Eyes:  Negative for photophobia and visual disturbance. Respiratory:  Positive for cough. Negative for shortness of breath. Cardiovascular:  Negative for chest pain, palpitations and leg swelling. Gastrointestinal:  Negative for abdominal pain, diarrhea, nausea and vomiting. Genitourinary:  Negative for difficulty urinating and flank pain. Musculoskeletal:  Positive for myalgias. Negative for arthralgias, back pain and neck pain. Skin:  Negative for rash. Neurological:  Negative for seizures, syncope, weakness and headaches.        PAST MEDICAL AND SURGICAL HISTORY     Past Medical History:   Diagnosis Date    Asthma     Cerebral artery occlusion with cerebral infarction ([facility] [address]     Chronic kidney disease     Diarrhea     Hemodialysis patient [facility])     previous    Hypertension      Past Surgical History:   Procedure Laterality Date    COLONOSCOPY           MEDICATIONS   No current facility-administered medications for this encounter. Current Outpatient Medications:     nirmatrelvir/ritonavir (PAXLOVID) 20 x 150 MG & 10 x 100MG TBPK, Take 3 tablets (two 150 mg nirmatrelvir and one 100 mg ritonavir tablets) by mouth every 12 hours for 5 days. , Disp: 30 tablet, Rfl: 0    loperamide (IMODIUM A-D) 2 MG tablet, Take 2 mg by mouth 4 times daily as needed for Diarrhea, Disp: , Rfl:     polyethylene glycol (MIRALAX) POWD powder, Take 17 g by mouth daily, Disp: , Rfl:     acetaminophen (TYLENOL) 325 MG tablet, Take 650 mg by mouth every 6 hours as needed for Pain, Disp: , Rfl:     LORazepam (ATIVAN) 1 MG tablet, Take 1 mg by mouth every 4 hours as needed for Anxiety. ., Disp: , Rfl:     aspirin 81 MG chewable tablet, Take 1 tablet by mouth daily, Disp: 30 tablet, Rfl: 3    lisinopril (PRINIVIL;ZESTRIL) 2.5 MG tablet, Take 1 tablet by mouth daily . hold if SBP less than 100 mm hg, Disp: 30 tablet, Rfl: 3    carvedilol (COREG) 6.25 MG tablet, Take 1 tablet by mouth 2 times daily (with meals) . hold if SBP less than 90 mm hg or HR less than 50, Disp: 60 tablet, Rfl: 3    folic acid (FOLVITE) 1 MG tablet, Take 1 tablet by mouth daily, Disp: 30 tablet, Rfl: 3    sodium chloride 1 g tablet, Take 1 tablet by mouth 3 times daily (with meals) for 7 days, Disp: 21 tablet, Rfl: 0    Multiple Vitamin (MULTIVITAMIN) tablet, Take 1 tablet by mouth daily, Disp: , Rfl: 0    pantoprazole (PROTONIX) 40 MG tablet, Take 1 tablet by mouth 2 times daily (before meals), Disp: 30 tablet, Rfl: 3    vitamin B-1 100 MG tablet, Take 1 tablet by mouth daily, Disp: 30 tablet, Rfl: 3      SOCIAL HISTORY     Social History     Social History Narrative    Not on file     Social History     Tobacco Use    Smoking status: Every Day     Packs/day: 0.50     Years: 44.00     Pack years: 22.00     Types: Cigarettes    Smokeless tobacco: Never   Vaping Use    Vaping Use: Never used   Substance Use Topics    Alcohol use: No     Alcohol/week: 4.0 standard drinks     Types: 4 Cans of beer per week    Drug use: No         ALLERGIES     Allergies   Allergen Reactions    Sulfa Antibiotics Anaphylaxis         FAMILY HISTORY     Family History   Problem Relation Age of Onset    Other Mother         alzheimers    Heart Disease Father     Cancer Brother         colon    Heart Disease Brother          PREVIOUS RECORDS   Previous records reviewed: I reviewed the patient's past medical records including relevant labs, imaging and procedures. PHYSICAL EXAM     ED Triage Vitals [12/13/22 1324]   BP Temp Temp Source Heart Rate Resp SpO2 Height Weight   (!) 133/97 99.5 °F (37.5 °C) Oral (!) 106 18 97 % -- 150 lb (68 kg)     Initial vital signs and nursing assessment reviewed and normal. Body mass index is 22.81 kg/m². Pulsoximetry is normal per my interpretation. Additional Vital Signs:  Vitals:    12/13/22 1520   BP: (!) 146/92   Pulse: 94   Resp: 17   Temp:    SpO2: 97%       Physical Exam  Vitals and nursing note reviewed. Constitutional:       General: He is not in acute distress. Appearance: Normal appearance. He is normal weight. He is not toxic-appearing. HENT:      Head: Normocephalic and atraumatic. Right Ear: Tympanic membrane normal.      Left Ear: Tympanic membrane normal.      Nose: Nose normal.      Mouth/Throat:      Mouth: Mucous membranes are moist.      Pharynx: Oropharynx is clear. Eyes:      General: No scleral icterus. Extraocular Movements: Extraocular movements intact. Conjunctiva/sclera: Conjunctivae normal.      Pupils: Pupils are equal, round, and reactive to light. Cardiovascular:      Rate and Rhythm: Regular rhythm. Tachycardia present. Pulses: Normal pulses. Heart sounds: Normal heart sounds. No murmur heard. No friction rub. No gallop. Pulmonary:      Effort: Pulmonary effort is normal.      Breath sounds: Normal breath sounds. No wheezing or rales.    Abdominal:      Palpations: Abdomen is soft. Tenderness: There is no abdominal tenderness. There is no guarding or rebound. Musculoskeletal:         General: Normal range of motion. Cervical back: Normal range of motion and neck supple. Right lower leg: No edema. Left lower leg: No edema. Skin:     General: Skin is warm and dry. Capillary Refill: Capillary refill takes less than 2 seconds. Neurological:      General: No focal deficit present. Mental Status: He is alert and oriented to person, place, and time. Cranial Nerves: No cranial nerve deficit. Sensory: No sensory deficit. Motor: No weakness. Coordination: Coordination normal.           ED RESULTS   Laboratory results:  Labs Reviewed   COVID-19 & INFLUENZA COMBO - Abnormal; Notable for the following components:       Result Value    SARS-CoV-2 RNA, RT PCR DETECTED (*)     All other components within normal limits       Radiologic studies results:  XR CHEST (2 VW)   Final Result   1. No acute cardiopulmonary finding. **This report has been created using voice recognition software. It may contain minor errors which are inherent in voice recognition technology. **      Final report electronically signed by Dr Pamela Rogers on 12/13/2022 2:56 PM          ED Medications administered this visit:   Medications   ketorolac (TORADOL) injection 15 mg (15 mg IntraMUSCular Given 12/13/22 1519)   acetaminophen (TYLENOL) tablet 650 mg (650 mg Oral Given 12/13/22 1519)         ED COURSE     ED Course as of 12/13/22 1521   Tue Dec 13, 2022   1407 SARS-CoV-2 RNA, RT PCR(!!): DETECTED [TM]   1431 SIRS 1/4 [TM]   1505 XR CHEST (2 VW)  IMPRESSION:  1.  No acute cardiopulmonary finding. [TM]      ED Course User Index  [TM] Romelia Toth MD           MEDICAL DECISION MAKING   Given the patient's above chief complaint and findings on history and physical examination, I thought it was appropriate to consider the following emergency medical conditions:  COVID, influenza, acute bronchitis, unspecified viral URTI, asthma exacerbation, arrhythmia  Although some of these diagnoses are unlikely they were considered in my medical decision making. Patient presents with cough generalized body aches. COVID test was positive. Wrote patient paxlovid prescription. No signs of respiratory distress in the emergency department. Saturations remained above 97%. Myalgias improved with Toradol and Tylenol. Chest x-ray unremarkable. EKG NSR without any acute ischemic changes        Strict return precautions and follow up instructions were discussed with the patient prior to discharge, with which the patient agrees. MEDICATION CHANGES     New Prescriptions    NIRMATRELVIR/RITONAVIR (PAXLOVID) 20 X 150 MG & 10 X 100MG TBPK    Take 3 tablets (two 150 mg nirmatrelvir and one 100 mg ritonavir tablets) by mouth every 12 hours for 5 days. FINAL DISPOSITION     Final diagnoses:   COVID-19     Condition: condition: stable  Dispo: Discharge to home      This transcription was electronically signed. Parts of this transcriptions may have been dictated by use of voice recognition software and electronically transcribed, and parts may have been transcribed with the assistance of an ED scribe. The transcription may contain errors not detected in proofreading. Please refer to my supervising physician's documentation if my documentation differs.     Electronically Signed: Maci Gonsales MD, 12/13/22, 3:21 PM         Romelia Toth MD  Resident  12/13/22 9794       Romelia Toth MD  Resident  12/13/22 0126

## 2022-12-13 NOTE — ED NOTES
Dr. Major Elders in to speak with patient and family again about discharge plan at this time.      Candi Vora RN  12/13/22 9662

## 2022-12-13 NOTE — ED NOTES
Pt presents to ED with a host of complaints. Pt is homeless and is unable to care for self. Pt reports symptoms started approx 2-3 months ago and sister was finally able to convince pt to be seen. Pt reports having a heart defect as well. Upon initital assessment, pt is A&Ox4, resps slightly labored. Pt c/o chest pressure, shortness of breath, generalized body aches and cough. EKG completed. Pt swabbed for flu/covid and sent to lab. Pt reports taking Tylenol last evening for symptoms. Nothing since.       Jairon Wallace, RN  12/13/22 0156

## 2022-12-14 LAB
EKG ATRIAL RATE: 106 BPM
EKG P AXIS: 77 DEGREES
EKG P-R INTERVAL: 132 MS
EKG Q-T INTERVAL: 340 MS
EKG QRS DURATION: 76 MS
EKG QTC CALCULATION (BAZETT): 451 MS
EKG R AXIS: 74 DEGREES
EKG T AXIS: 67 DEGREES
EKG VENTRICULAR RATE: 106 BPM

## 2022-12-14 PROCEDURE — 93010 ELECTROCARDIOGRAM REPORT: CPT | Performed by: INTERNAL MEDICINE

## 2023-09-19 ENCOUNTER — OUTSIDE SERVICES (OUTPATIENT)
Dept: INTERNAL MEDICINE CLINIC | Age: 68
End: 2023-09-19

## 2023-09-19 VITALS — HEART RATE: 67 BPM | SYSTOLIC BLOOD PRESSURE: 138 MMHG | OXYGEN SATURATION: 99 % | DIASTOLIC BLOOD PRESSURE: 72 MMHG

## 2023-09-19 DIAGNOSIS — Z87.898 HISTORY OF ALCOHOL USE DISORDER: ICD-10-CM

## 2023-09-19 DIAGNOSIS — I50.20 HFREF (HEART FAILURE WITH REDUCED EJECTION FRACTION) (HCC): ICD-10-CM

## 2023-09-19 DIAGNOSIS — F17.200 CURRENT EVERY DAY SMOKER: ICD-10-CM

## 2023-09-19 DIAGNOSIS — I10 PRIMARY HYPERTENSION: ICD-10-CM

## 2023-09-19 DIAGNOSIS — F14.91 HISTORY OF COCAINE USE: ICD-10-CM

## 2023-09-19 DIAGNOSIS — Z91.89 AT RISK FOR IMPAIRED HEALTH MAINTENANCE: ICD-10-CM

## 2023-09-19 DIAGNOSIS — B18.2 CHRONIC HEPATITIS C WITHOUT HEPATIC COMA (HCC): ICD-10-CM

## 2023-09-19 DIAGNOSIS — Z86.73 HISTORY OF STROKE: ICD-10-CM

## 2023-09-19 DIAGNOSIS — Z76.89 ENCOUNTER TO ESTABLISH CARE: Primary | ICD-10-CM

## 2023-09-19 LAB
AVERAGE GLUCOSE: NORMAL
HBA1C MFR BLD: 5 %

## 2023-09-19 SDOH — ECONOMIC STABILITY: INCOME INSECURITY: HOW HARD IS IT FOR YOU TO PAY FOR THE VERY BASICS LIKE FOOD, HOUSING, MEDICAL CARE, AND HEATING?: SOMEWHAT HARD

## 2023-09-19 SDOH — ECONOMIC STABILITY: FOOD INSECURITY: WITHIN THE PAST 12 MONTHS, YOU WORRIED THAT YOUR FOOD WOULD RUN OUT BEFORE YOU GOT MONEY TO BUY MORE.: NEVER TRUE

## 2023-09-19 SDOH — ECONOMIC STABILITY: INCOME INSECURITY: IN THE LAST 12 MONTHS, WAS THERE A TIME WHEN YOU WERE NOT ABLE TO PAY THE MORTGAGE OR RENT ON TIME?: YES

## 2023-09-19 SDOH — HEALTH STABILITY: PHYSICAL HEALTH: ON AVERAGE, HOW MANY DAYS PER WEEK DO YOU ENGAGE IN MODERATE TO STRENUOUS EXERCISE (LIKE A BRISK WALK)?: 7 DAYS

## 2023-09-19 SDOH — ECONOMIC STABILITY: FOOD INSECURITY: WITHIN THE PAST 12 MONTHS, THE FOOD YOU BOUGHT JUST DIDN'T LAST AND YOU DIDN'T HAVE MONEY TO GET MORE.: SOMETIMES TRUE

## 2023-09-19 SDOH — ECONOMIC STABILITY: HOUSING INSECURITY
IN THE LAST 12 MONTHS, WAS THERE A TIME WHEN YOU DID NOT HAVE A STEADY PLACE TO SLEEP OR SLEPT IN A SHELTER (INCLUDING NOW)?: YES

## 2023-09-19 SDOH — HEALTH STABILITY: PHYSICAL HEALTH: ON AVERAGE, HOW MANY MINUTES DO YOU ENGAGE IN EXERCISE AT THIS LEVEL?: 120 MIN

## 2023-09-19 SDOH — ECONOMIC STABILITY: INCOME INSECURITY: HOW HARD IS IT FOR YOU TO PAY FOR THE VERY BASICS LIKE FOOD, HOUSING, MEDICAL CARE, AND HEATING?: VERY HARD

## 2023-09-19 ASSESSMENT — ANXIETY QUESTIONNAIRES
3. WORRYING TOO MUCH ABOUT DIFFERENT THINGS: 0
4. TROUBLE RELAXING: 1
6. BECOMING EASILY ANNOYED OR IRRITABLE: 0
GAD7 TOTAL SCORE: 4
5. BEING SO RESTLESS THAT IT IS HARD TO SIT STILL: 3
IF YOU CHECKED OFF ANY PROBLEMS ON THIS QUESTIONNAIRE, HOW DIFFICULT HAVE THESE PROBLEMS MADE IT FOR YOU TO DO YOUR WORK, TAKE CARE OF THINGS AT HOME, OR GET ALONG WITH OTHER PEOPLE: NOT DIFFICULT AT ALL
2. NOT BEING ABLE TO STOP OR CONTROL WORRYING: 0
7. FEELING AFRAID AS IF SOMETHING AWFUL MIGHT HAPPEN: 0
1. FEELING NERVOUS, ANXIOUS, OR ON EDGE: 0

## 2023-09-19 ASSESSMENT — PATIENT HEALTH QUESTIONNAIRE - PHQ9
SUM OF ALL RESPONSES TO PHQ QUESTIONS 1-9: 0
SUM OF ALL RESPONSES TO PHQ QUESTIONS 1-9: 0
1. LITTLE INTEREST OR PLEASURE IN DOING THINGS: 0
SUM OF ALL RESPONSES TO PHQ QUESTIONS 1-9: 0
SUM OF ALL RESPONSES TO PHQ QUESTIONS 1-9: 0
SUM OF ALL RESPONSES TO PHQ9 QUESTIONS 1 & 2: 0
2. FEELING DOWN, DEPRESSED OR HOPELESS: 0

## 2023-09-19 NOTE — PROGRESS NOTES
Behavior normal.         Labs Reviewed:    Lab Results   Component Value Date    WBC 4.4 01/23/2019    HGB 13.9 01/23/2019    HCT 42.7 01/23/2019     01/23/2019    CHOL 112 01/23/2019    TRIG 60 01/23/2019    HDL 39 (L) 01/23/2019    ALT 21 01/23/2019    AST 35 01/23/2019     01/23/2019    K 4.5 01/23/2019    CL 97 (L) 01/23/2019    CREATININE 0.82 01/23/2019    BUN 10 01/23/2019    CO2 25 01/23/2019    TSH 1.40 01/23/2019    PSA 1.14 01/23/2019    INR 1.30 (H) 06/18/2018    LABA1C 5.0 09/19/2023       The patient is in agreement with and verbalizes understanding of the plan of care today and has no additional questions or complaints. The patient was instructed to Return in about 20 days (around 10/9/2023) for Establishment in [facility]. He was instructed to contact our office sooner if problems should arise. Laboratory studies will be completed prior to next visit if ordered. Case discussed with Dr. Abisai Aquino MD via secured [facility] messaging system. Electronically signed by Kat Small MD on 9/19/2023 at 5:17 PM    (Please note that portions of this note were completed with a voice recognition program and electronically transcribed. Efforts were made to edit the dictations but occasionally words are mis-transcribed. This transcription may contain errors not detected in proofreading.  This transcription was electronically signed.)

## 2023-09-19 NOTE — ASSESSMENT & PLAN NOTE
Well-controlled. Unclear etiology but suspect it is from alcohol or cocaine use. No LHC so ICM has not completely been ruled out. No s/s of decompensation.   Plan:  -Can suggest initiation of GDMT as tolerated  -Can suggest evaluation and establishment of care with cardiology  -Can suggest TTE however no current indications other than to check whether cardiac function has recovered

## 2023-09-19 NOTE — ASSESSMENT & PLAN NOTE
Patient presents today after moving from [place] to live with his sister. He would like to establish care here at [facility].

## 2023-09-19 NOTE — ASSESSMENT & PLAN NOTE
Uncontrolled. Pt is not on any medications for this.    Plan:  -Recommend discussing initiation of statin / ASA for stroke prevention  -Fasting lipid panel ordered

## 2023-09-19 NOTE — ASSESSMENT & PLAN NOTE
Uncontrolled. Pt smokes daily. He is interested in quitting.   Plan:  -Referral to smoking cessation clinic  -Recommend LDCT to screen for lung cancer

## 2023-09-19 NOTE — ASSESSMENT & PLAN NOTE
Unclear control. 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 Unclear whether he has received treatment.  No clinical s/s of active hepatitis C infection  Plan:  -Can consider HCV PCR however pt has no clinical s/s to suspect active infection  -Can consider treatment if indicated

## 2023-09-20 ENCOUNTER — TELEPHONE (OUTPATIENT)
Dept: PHARMACY | Age: 68
End: 2023-09-20

## 2023-09-20 NOTE — TELEPHONE ENCOUNTER
Referral to [facility] received from Dr. Bonnie Addison for Smoking Cessation Patient Education/Counseling. I attempted to call pt. There is no number listed for pt so I tried the emergency contact numbers listed for brother Kylah Venegas-  I tried both numbers listed and both said the numbers are no longer in service. Pt does not have my chart so unable to send message that way. I will forwarded this note to ordering Dr as we have no way to contact pt. Referral closed since we can not contact pt. I notified ordering dr that if they have way to contact pt to let us know and if pt is interested we will get new referral for smoking cessation.

## 2023-10-09 ENCOUNTER — APPOINTMENT (OUTPATIENT)
Dept: GENERAL RADIOLOGY | Age: 68
End: 2023-10-09
Payer: MEDICARE

## 2023-10-09 ENCOUNTER — HOSPITAL ENCOUNTER (OUTPATIENT)
Age: 68
Setting detail: OBSERVATION
Discharge: HOME | End: 2023-10-11
Attending: STUDENT IN AN ORGANIZED HEALTH CARE EDUCATION/TRAINING PROGRAM | Admitting: PHYSICIAN ASSISTANT
Payer: MEDICARE

## 2023-10-09 ENCOUNTER — APPOINTMENT (OUTPATIENT)
Dept: CT IMAGING | Age: 68
End: 2023-10-09
Payer: MEDICARE

## 2023-10-09 DIAGNOSIS — D72.819 LEUKOPENIA, UNSPECIFIED TYPE: ICD-10-CM

## 2023-10-09 DIAGNOSIS — R55 SYNCOPE AND COLLAPSE: Primary | ICD-10-CM

## 2023-10-09 DIAGNOSIS — R53.1 GENERALIZED WEAKNESS: ICD-10-CM

## 2023-10-09 LAB
AMPHETAMINES UR QL SCN: NEGATIVE
ANION GAP SERPL CALC-SCNC: 13 MEQ/L (ref 8–16)
BACTERIA: ABNORMAL
BARBITURATES UR QL SCN: NEGATIVE
BASOPHILS ABSOLUTE: 0 THOU/MM3 (ref 0–0.1)
BASOPHILS NFR BLD AUTO: 0.8 %
BENZODIAZ UR QL SCN: NEGATIVE
BILIRUB UR QL STRIP: NEGATIVE
BUN SERPL-MCNC: 15 MG/DL (ref 7–22)
BZE UR QL SCN: POSITIVE
CALCIUM SERPL-MCNC: 8.9 MG/DL (ref 8.5–10.5)
CANNABINOIDS UR QL SCN: NEGATIVE
CASTS #/AREA URNS LPF: ABNORMAL /LPF
CASTS #/AREA URNS LPF: ABNORMAL /LPF
CHARACTER UR: CLEAR
CHARCOAL URNS QL MICRO: ABNORMAL
CHLORIDE SERPL-SCNC: 93 MEQ/L (ref 98–111)
CO2 SERPL-SCNC: 25 MEQ/L (ref 23–33)
COLOR UR: YELLOW
CREAT SERPL-MCNC: 1.3 MG/DL (ref 0.4–1.2)
CREAT UR-MCNC: 129.1 MG/DL
CRYSTALS URNS QL MICRO: ABNORMAL
D DIMER PPP IA.FEU-MCNC: 320 NG/ML FEU (ref 0–500)
DEPRECATED RDW RBC AUTO: 49.1 FL (ref 35–45)
EKG ATRIAL RATE: 91 BPM
EKG P AXIS: 83 DEGREES
EKG P-R INTERVAL: 146 MS
EKG Q-T INTERVAL: 370 MS
EKG QRS DURATION: 84 MS
EKG QTC CALCULATION (BAZETT): 455 MS
EKG R AXIS: 78 DEGREES
EKG T AXIS: 69 DEGREES
EKG VENTRICULAR RATE: 91 BPM
EOSINOPHIL NFR BLD AUTO: 1.1 %
EOSINOPHILS ABSOLUTE: 0 THOU/MM3 (ref 0–0.4)
EPITHELIAL CELLS, UA: ABNORMAL /HPF
ERYTHROCYTE [DISTWIDTH] IN BLOOD BY AUTOMATED COUNT: 12.9 % (ref 11.5–14.5)
ETHANOL SERPL-MCNC: < 0.01 %
FENTANYL: NEGATIVE
FLUAV RNA RESP QL NAA+PROBE: NOT DETECTED
FLUBV RNA RESP QL NAA+PROBE: NOT DETECTED
GFR SERPL CREATININE-BSD FRML MDRD: 59 ML/MIN/1.73M2
GLUCOSE BLD STRIP.AUTO-MCNC: 77 MG/DL (ref 70–108)
GLUCOSE SERPL-MCNC: 69 MG/DL (ref 70–108)
GLUCOSE UR QL STRIP.AUTO: NEGATIVE MG/DL
HCT VFR BLD AUTO: 41.3 % (ref 42–52)
HGB BLD-MCNC: 14 GM/DL (ref 14–18)
HGB UR QL STRIP.AUTO: NEGATIVE
IMM GRANULOCYTES # BLD AUTO: 0.01 THOU/MM3 (ref 0–0.07)
IMM GRANULOCYTES NFR BLD AUTO: 0.4 %
KETONES UR QL STRIP.AUTO: ABNORMAL
LEUKOCYTE ESTERASE UR QL STRIP.AUTO: NEGATIVE
LYMPHOCYTES ABSOLUTE: 1 THOU/MM3 (ref 1–4.8)
LYMPHOCYTES NFR BLD AUTO: 38.5 %
MAGNESIUM SERPL-MCNC: 2.2 MG/DL (ref 1.6–2.4)
MCH RBC QN AUTO: 35 PG (ref 26–33)
MCHC RBC AUTO-ENTMCNC: 33.9 GM/DL (ref 32.2–35.5)
MCV RBC AUTO: 103.3 FL (ref 80–94)
MONOCYTES ABSOLUTE: 0.4 THOU/MM3 (ref 0.4–1.3)
MONOCYTES NFR BLD AUTO: 16.2 %
NEUTROPHILS NFR BLD AUTO: 43 %
NITRITE UR QL STRIP.AUTO: NEGATIVE
NRBC BLD AUTO-RTO: 0 /100 WBC
OPIATES UR QL SCN: NEGATIVE
OSMOLALITY SERPL CALC.SUM OF ELEC: 261.9 MOSMOL/KG (ref 275–300)
OXYCODONE: NEGATIVE
PCP UR QL SCN: NEGATIVE
PH UR STRIP.AUTO: 6.5 [PH] (ref 5–9)
PLATELET # BLD AUTO: 183 THOU/MM3 (ref 130–400)
PMV BLD AUTO: 10.2 FL (ref 9.4–12.4)
POTASSIUM SERPL-SCNC: 4.3 MEQ/L (ref 3.5–5.2)
PROT UR STRIP.AUTO-MCNC: NEGATIVE MG/DL
RBC # BLD AUTO: 4 MILL/MM3 (ref 4.7–6.1)
RBC #/AREA URNS HPF: ABNORMAL /HPF
RENAL EPI CELLS #/AREA URNS HPF: ABNORMAL /[HPF]
SARS-COV-2 RNA RESP QL NAA+PROBE: NOT DETECTED
SEGMENTED NEUTROPHILS ABSOLUTE COUNT: 1.2 THOU/MM3 (ref 1.8–7.7)
SODIUM SERPL-SCNC: 131 MEQ/L (ref 135–145)
SODIUM UR-SCNC: 27 MEQ/L
SPECIFIC GRAVITY UA: 1.01 (ref 1–1.03)
TROPONIN, HIGH SENSITIVITY: 11 NG/L (ref 0–12)
UROBILINOGEN, URINE: 1 EU/DL (ref 0–1)
WBC # BLD AUTO: 2.7 THOU/MM3 (ref 4.8–10.8)
WBC #/AREA URNS HPF: ABNORMAL /HPF
YEAST LIKE FUNGI URNS QL MICRO: ABNORMAL

## 2023-10-09 PROCEDURE — 85379 FIBRIN DEGRADATION QUANT: CPT

## 2023-10-09 PROCEDURE — 84300 ASSAY OF URINE SODIUM: CPT

## 2023-10-09 PROCEDURE — 87636 SARSCOV2 & INF A&B AMP PRB: CPT

## 2023-10-09 PROCEDURE — 72125 CT NECK SPINE W/O DYE: CPT

## 2023-10-09 PROCEDURE — 82570 ASSAY OF URINE CREATININE: CPT

## 2023-10-09 PROCEDURE — 96361 HYDRATE IV INFUSION ADD-ON: CPT

## 2023-10-09 PROCEDURE — 93010 ELECTROCARDIOGRAM REPORT: CPT | Performed by: INTERNAL MEDICINE

## 2023-10-09 PROCEDURE — 82948 REAGENT STRIP/BLOOD GLUCOSE: CPT

## 2023-10-09 PROCEDURE — 80048 BASIC METABOLIC PNL TOTAL CA: CPT

## 2023-10-09 PROCEDURE — 2580000003 HC RX 258: Performed by: STUDENT IN AN ORGANIZED HEALTH CARE EDUCATION/TRAINING PROGRAM

## 2023-10-09 PROCEDURE — 82077 ASSAY SPEC XCP UR&BREATH IA: CPT

## 2023-10-09 PROCEDURE — 70450 CT HEAD/BRAIN W/O DYE: CPT

## 2023-10-09 PROCEDURE — 71046 X-RAY EXAM CHEST 2 VIEWS: CPT

## 2023-10-09 PROCEDURE — 99223 1ST HOSP IP/OBS HIGH 75: CPT | Performed by: PHYSICIAN ASSISTANT

## 2023-10-09 PROCEDURE — 96360 HYDRATION IV INFUSION INIT: CPT

## 2023-10-09 PROCEDURE — 99285 EMERGENCY DEPT VISIT HI MDM: CPT

## 2023-10-09 PROCEDURE — 6370000000 HC RX 637 (ALT 250 FOR IP): Performed by: STUDENT IN AN ORGANIZED HEALTH CARE EDUCATION/TRAINING PROGRAM

## 2023-10-09 PROCEDURE — 96372 THER/PROPH/DIAG INJ SC/IM: CPT

## 2023-10-09 PROCEDURE — G0378 HOSPITAL OBSERVATION PER HR: HCPCS

## 2023-10-09 PROCEDURE — 6370000000 HC RX 637 (ALT 250 FOR IP): Performed by: PHYSICIAN ASSISTANT

## 2023-10-09 PROCEDURE — 80307 DRUG TEST PRSMV CHEM ANLYZR: CPT

## 2023-10-09 PROCEDURE — 83735 ASSAY OF MAGNESIUM: CPT

## 2023-10-09 PROCEDURE — 84484 ASSAY OF TROPONIN QUANT: CPT

## 2023-10-09 PROCEDURE — 6360000002 HC RX W HCPCS: Performed by: PHYSICIAN ASSISTANT

## 2023-10-09 PROCEDURE — 93005 ELECTROCARDIOGRAM TRACING: CPT | Performed by: PHYSICIAN ASSISTANT

## 2023-10-09 PROCEDURE — 2580000003 HC RX 258: Performed by: PHYSICIAN ASSISTANT

## 2023-10-09 PROCEDURE — 85025 COMPLETE CBC W/AUTO DIFF WBC: CPT

## 2023-10-09 PROCEDURE — 93005 ELECTROCARDIOGRAM TRACING: CPT | Performed by: STUDENT IN AN ORGANIZED HEALTH CARE EDUCATION/TRAINING PROGRAM

## 2023-10-09 PROCEDURE — 36415 COLL VENOUS BLD VENIPUNCTURE: CPT

## 2023-10-09 PROCEDURE — 81001 URINALYSIS AUTO W/SCOPE: CPT

## 2023-10-09 RX ORDER — MULTIVITAMIN WITH IRON
1 TABLET ORAL DAILY
Status: DISCONTINUED | OUTPATIENT
Start: 2023-10-09 | End: 2023-10-11 | Stop reason: HOSPADM

## 2023-10-09 RX ORDER — ACETAMINOPHEN 325 MG/1
650 TABLET ORAL EVERY 6 HOURS PRN
Status: DISCONTINUED | OUTPATIENT
Start: 2023-10-09 | End: 2023-10-11 | Stop reason: HOSPADM

## 2023-10-09 RX ORDER — ONDANSETRON 4 MG/1
4 TABLET, ORALLY DISINTEGRATING ORAL EVERY 8 HOURS PRN
Status: DISCONTINUED | OUTPATIENT
Start: 2023-10-09 | End: 2023-10-11 | Stop reason: HOSPADM

## 2023-10-09 RX ORDER — 0.9 % SODIUM CHLORIDE 0.9 %
1000 INTRAVENOUS SOLUTION INTRAVENOUS ONCE
Status: COMPLETED | OUTPATIENT
Start: 2023-10-09 | End: 2023-10-09

## 2023-10-09 RX ORDER — LANOLIN ALCOHOL/MO/W.PET/CERES
100 CREAM (GRAM) TOPICAL DAILY
Status: DISCONTINUED | OUTPATIENT
Start: 2023-10-09 | End: 2023-10-11 | Stop reason: HOSPADM

## 2023-10-09 RX ORDER — SODIUM CHLORIDE 0.9 % (FLUSH) 0.9 %
5-40 SYRINGE (ML) INJECTION EVERY 12 HOURS SCHEDULED
Status: DISCONTINUED | OUTPATIENT
Start: 2023-10-09 | End: 2023-10-11 | Stop reason: HOSPADM

## 2023-10-09 RX ORDER — ASPIRIN 81 MG/1
324 TABLET, CHEWABLE ORAL ONCE
Status: COMPLETED | OUTPATIENT
Start: 2023-10-09 | End: 2023-10-09

## 2023-10-09 RX ORDER — ENOXAPARIN SODIUM 100 MG/ML
40 INJECTION SUBCUTANEOUS DAILY
Status: DISCONTINUED | OUTPATIENT
Start: 2023-10-09 | End: 2023-10-11 | Stop reason: HOSPADM

## 2023-10-09 RX ORDER — ONDANSETRON 2 MG/ML
4 INJECTION INTRAMUSCULAR; INTRAVENOUS EVERY 6 HOURS PRN
Status: DISCONTINUED | OUTPATIENT
Start: 2023-10-09 | End: 2023-10-11 | Stop reason: HOSPADM

## 2023-10-09 RX ORDER — SODIUM CHLORIDE 0.9 % (FLUSH) 0.9 %
5-40 SYRINGE (ML) INJECTION PRN
Status: DISCONTINUED | OUTPATIENT
Start: 2023-10-09 | End: 2023-10-11 | Stop reason: HOSPADM

## 2023-10-09 RX ORDER — SODIUM CHLORIDE 9 MG/ML
INJECTION, SOLUTION INTRAVENOUS PRN
Status: DISCONTINUED | OUTPATIENT
Start: 2023-10-09 | End: 2023-10-11 | Stop reason: HOSPADM

## 2023-10-09 RX ORDER — POLYETHYLENE GLYCOL 3350 17 G/17G
17 POWDER, FOR SOLUTION ORAL DAILY PRN
Status: DISCONTINUED | OUTPATIENT
Start: 2023-10-09 | End: 2023-10-11 | Stop reason: HOSPADM

## 2023-10-09 RX ORDER — SODIUM CHLORIDE 9 MG/ML
INJECTION, SOLUTION INTRAVENOUS CONTINUOUS
Status: ACTIVE | OUTPATIENT
Start: 2023-10-09 | End: 2023-10-10

## 2023-10-09 RX ORDER — ACETAMINOPHEN 650 MG/1
650 SUPPOSITORY RECTAL EVERY 6 HOURS PRN
Status: DISCONTINUED | OUTPATIENT
Start: 2023-10-09 | End: 2023-10-11 | Stop reason: HOSPADM

## 2023-10-09 RX ADMIN — SODIUM CHLORIDE 1000 ML: 9 INJECTION, SOLUTION INTRAVENOUS at 14:43

## 2023-10-09 RX ADMIN — ASPIRIN 324 MG: 81 TABLET, CHEWABLE ORAL at 16:42

## 2023-10-09 RX ADMIN — Medication 1 TABLET: at 19:28

## 2023-10-09 RX ADMIN — ENOXAPARIN SODIUM 40 MG: 100 INJECTION SUBCUTANEOUS at 19:29

## 2023-10-09 RX ADMIN — SODIUM CHLORIDE, PRESERVATIVE FREE 10 ML: 5 INJECTION INTRAVENOUS at 19:29

## 2023-10-09 RX ADMIN — Medication 100 MG: at 19:28

## 2023-10-09 RX ADMIN — SODIUM CHLORIDE: 9 INJECTION, SOLUTION INTRAVENOUS at 19:30

## 2023-10-09 RX ADMIN — ACETAMINOPHEN 650 MG: 325 TABLET ORAL at 19:29

## 2023-10-09 ASSESSMENT — PAIN SCALES - GENERAL
PAINLEVEL_OUTOF10: 0
PAINLEVEL_OUTOF10: 0
PAINLEVEL_OUTOF10: 10

## 2023-10-09 ASSESSMENT — PAIN DESCRIPTION - LOCATION: LOCATION: SHOULDER

## 2023-10-09 ASSESSMENT — PAIN - FUNCTIONAL ASSESSMENT: PAIN_FUNCTIONAL_ASSESSMENT: 0-10

## 2023-10-09 ASSESSMENT — PAIN DESCRIPTION - ORIENTATION: ORIENTATION: LEFT

## 2023-10-09 NOTE — ED NOTES
Pt swabbed for covid and flu. Urine sample collected and sent to lab.        Mary Singh RN  10/09/23 7908

## 2023-10-09 NOTE — ED TRIAGE NOTES
Pt presents to the ED after passing out. Pt states he was at the [employer] kitchen sitting in a chair and then woke up on the ground. Pt does not remember falling and is unsure of how long he was unconscious. Pt complaining  of chest pain and right shoulder pain. Pt states he walked here from the soup kitchen.

## 2023-10-09 NOTE — ED NOTES
When attempting to stand up pt stated he was too dizzy to stand.       Matthias Gayle, RN  10/09/23 8674

## 2023-10-09 NOTE — ED NOTES
Admitting provider at bedside.  Pt updated on inpatient bed status     Joelle Judd RN  10/09/23 7075

## 2023-10-09 NOTE — H&P
pain COMPARISON: 12/13/2022 TECHNIQUE:  AP and lateral chest x-ray  FINDINGS: Heart size appears within normal limits. No focal consolidation, pleural effusion or pneumothorax is seen. No acute osseous findings are demonstrated. 1. No acute cardiopulmonary findings. **This report has been created using voice recognition software. It may contain minor errors which are inherent in voice recognition technology. ** Final report electronically signed by Dr. Omid Maradiaga on 10/9/2023 3:06 PM      DVT prophylaxis: Lovenox    Diet: ADULT DIET; Regular; No Added Salt (3-4 gm); 2000 ml    Fluids: NS    Code Status: Full Code    Therapies: Physical therapy    Tele:   [x] yes             [] no      Thank you Melania Ellis MD for the opportunity to be involved in this patient's care.     Electronically signed by Yessenia Karimi PA-C on 10/9/2023 at 7:51 PM

## 2023-10-09 NOTE — ED NOTES
Pt transported to [facility]08 by cart in stable condition. Called [facility] and informed [place] that the patient was on their way to the unit.       Haroldo Pope, ANDERSN  16/89/78 0759

## 2023-10-09 NOTE — ED PROVIDER NOTES
**This report has been created using voice recognition software. It may contain minor errors which are inherent in voice recognition technology. **      Final report electronically signed by DR Vikash Chavez on 10/9/2023 3:14 PM      XR CHEST (2 VW)   Final Result   1. No acute cardiopulmonary findings. **This report has been created using voice recognition software. It may contain minor errors which are inherent in voice recognition technology. **      Final report electronically signed by Dr. Alberta Crook on 10/9/2023 3:06 PM          LABS: (none if blank)  Labs Reviewed   CBC WITH AUTO DIFFERENTIAL - Abnormal; Notable for the following components:       Result Value    WBC 2.7 (*)     RBC 4.00 (*)     Hematocrit 41.3 (*)     .3 (*)     MCH 35.0 (*)     RDW-SD 49.1 (*)     Segs Absolute 1.2 (*)     All other components within normal limits   BASIC METABOLIC PANEL - Abnormal; Notable for the following components:    Sodium 131 (*)     Chloride 93 (*)     Glucose 69 (*)     Creatinine 1.3 (*)     All other components within normal limits   OSMOLALITY - Abnormal; Notable for the following components:    Osmolality Calc 261.9 (*)     All other components within normal limits   GLOMERULAR FILTRATION RATE, ESTIMATED - Abnormal; Notable for the following components:    Est, Glom Filt Rate 59 (*)     All other components within normal limits   COVID-19 & INFLUENZA COMBO   TROPONIN   D-DIMER, QUANTITATIVE   ETHANOL   ANION GAP   URINE DRUG SCREEN   URINALYSIS WITH REFLEX TO CULTURE   POCT GLUCOSE       (Any cultures that may have been sent were not resulted at the time of this patient visit)    [facility] / ED COURSE:     1) Number and Complexity of Problems            Problem List This Visit:         Chief Complaint   Patient presents with    Loss of Consciousness            Differential Diagnosis includes (but not limited to):  Dysrhythmia, vasovagal syncope, dehydration, orthostatic

## 2023-10-10 LAB
ANION GAP SERPL CALC-SCNC: 8 MEQ/L (ref 8–16)
BASOPHILS ABSOLUTE: 0 THOU/MM3 (ref 0–0.1)
BASOPHILS NFR BLD AUTO: 0.9 %
BUN SERPL-MCNC: 14 MG/DL (ref 7–22)
CALCIUM SERPL-MCNC: 8.1 MG/DL (ref 8.5–10.5)
CHLORIDE SERPL-SCNC: 100 MEQ/L (ref 98–111)
CO2 SERPL-SCNC: 24 MEQ/L (ref 23–33)
CREAT SERPL-MCNC: 1 MG/DL (ref 0.4–1.2)
DEPRECATED RDW RBC AUTO: 50.3 FL (ref 35–45)
EKG ATRIAL RATE: 80 BPM
EKG P AXIS: 72 DEGREES
EKG P-R INTERVAL: 166 MS
EKG Q-T INTERVAL: 382 MS
EKG QRS DURATION: 84 MS
EKG QTC CALCULATION (BAZETT): 440 MS
EKG R AXIS: 52 DEGREES
EKG T AXIS: 40 DEGREES
EKG VENTRICULAR RATE: 80 BPM
EOSINOPHIL NFR BLD AUTO: 2.7 %
EOSINOPHILS ABSOLUTE: 0.1 THOU/MM3 (ref 0–0.4)
ERYTHROCYTE [DISTWIDTH] IN BLOOD BY AUTOMATED COUNT: 13.2 % (ref 11.5–14.5)
GFR SERPL CREATININE-BSD FRML MDRD: > 60 ML/MIN/1.73M2
GLUCOSE SERPL-MCNC: 97 MG/DL (ref 70–108)
HCT VFR BLD AUTO: 37.2 % (ref 42–52)
HGB BLD-MCNC: 12.5 GM/DL (ref 14–18)
IMM GRANULOCYTES # BLD AUTO: 0.01 THOU/MM3 (ref 0–0.07)
IMM GRANULOCYTES NFR BLD AUTO: 0.4 %
LYMPHOCYTES ABSOLUTE: 1.3 THOU/MM3 (ref 1–4.8)
LYMPHOCYTES NFR BLD AUTO: 60.7 %
MCH RBC QN AUTO: 34.4 PG (ref 26–33)
MCHC RBC AUTO-ENTMCNC: 33.6 GM/DL (ref 32.2–35.5)
MCV RBC AUTO: 102.5 FL (ref 80–94)
MONOCYTES ABSOLUTE: 0.3 THOU/MM3 (ref 0.4–1.3)
MONOCYTES NFR BLD AUTO: 15.6 %
NEUTROPHILS NFR BLD AUTO: 19.7 %
NRBC BLD AUTO-RTO: 0 /100 WBC
PLATELET # BLD AUTO: 167 THOU/MM3 (ref 130–400)
PMV BLD AUTO: 10.7 FL (ref 9.4–12.4)
POTASSIUM SERPL-SCNC: 4.2 MEQ/L (ref 3.5–5.2)
RBC # BLD AUTO: 3.63 MILL/MM3 (ref 4.7–6.1)
SEGMENTED NEUTROPHILS ABSOLUTE COUNT: 0.4 THOU/MM3 (ref 1.8–7.7)
SODIUM SERPL-SCNC: 132 MEQ/L (ref 135–145)
TROPONIN, HIGH SENSITIVITY: 11 NG/L (ref 0–12)
WBC # BLD AUTO: 2.2 THOU/MM3 (ref 4.8–10.8)

## 2023-10-10 PROCEDURE — 95816 EEG AWAKE AND DROWSY: CPT | Performed by: PSYCHIATRY & NEUROLOGY

## 2023-10-10 PROCEDURE — 6370000000 HC RX 637 (ALT 250 FOR IP): Performed by: PHYSICIAN ASSISTANT

## 2023-10-10 PROCEDURE — 93010 ELECTROCARDIOGRAM REPORT: CPT | Performed by: INTERNAL MEDICINE

## 2023-10-10 PROCEDURE — 96372 THER/PROPH/DIAG INJ SC/IM: CPT

## 2023-10-10 PROCEDURE — 6360000002 HC RX W HCPCS: Performed by: PHYSICIAN ASSISTANT

## 2023-10-10 PROCEDURE — 95816 EEG AWAKE AND DROWSY: CPT

## 2023-10-10 PROCEDURE — 85025 COMPLETE CBC W/AUTO DIFF WBC: CPT

## 2023-10-10 PROCEDURE — 6370000000 HC RX 637 (ALT 250 FOR IP)

## 2023-10-10 PROCEDURE — G0378 HOSPITAL OBSERVATION PER HR: HCPCS

## 2023-10-10 PROCEDURE — 99232 SBSQ HOSP IP/OBS MODERATE 35: CPT | Performed by: HOSPITALIST

## 2023-10-10 PROCEDURE — 2580000003 HC RX 258: Performed by: PHYSICIAN ASSISTANT

## 2023-10-10 PROCEDURE — 80048 BASIC METABOLIC PNL TOTAL CA: CPT

## 2023-10-10 PROCEDURE — 97162 PT EVAL MOD COMPLEX 30 MIN: CPT

## 2023-10-10 PROCEDURE — 97116 GAIT TRAINING THERAPY: CPT

## 2023-10-10 PROCEDURE — 84484 ASSAY OF TROPONIN QUANT: CPT

## 2023-10-10 PROCEDURE — 36415 COLL VENOUS BLD VENIPUNCTURE: CPT

## 2023-10-10 RX ORDER — CARVEDILOL 3.12 MG/1
3.12 TABLET ORAL 2 TIMES DAILY WITH MEALS
Status: DISCONTINUED | OUTPATIENT
Start: 2023-10-10 | End: 2023-10-11

## 2023-10-10 RX ORDER — ASPIRIN 81 MG/1
81 TABLET, CHEWABLE ORAL DAILY
Status: DISCONTINUED | OUTPATIENT
Start: 2023-10-10 | End: 2023-10-11 | Stop reason: HOSPADM

## 2023-10-10 RX ORDER — ATORVASTATIN CALCIUM 40 MG/1
40 TABLET, FILM COATED ORAL NIGHTLY
Status: DISCONTINUED | OUTPATIENT
Start: 2023-10-10 | End: 2023-10-11 | Stop reason: HOSPADM

## 2023-10-10 RX ADMIN — ASPIRIN 81 MG CHEWABLE TABLET 81 MG: 81 TABLET CHEWABLE at 16:41

## 2023-10-10 RX ADMIN — SODIUM CHLORIDE, PRESERVATIVE FREE 10 ML: 5 INJECTION INTRAVENOUS at 22:47

## 2023-10-10 RX ADMIN — ATORVASTATIN CALCIUM 40 MG: 40 TABLET, FILM COATED ORAL at 22:47

## 2023-10-10 RX ADMIN — SODIUM CHLORIDE: 9 INJECTION, SOLUTION INTRAVENOUS at 05:40

## 2023-10-10 RX ADMIN — Medication 1 TABLET: at 09:29

## 2023-10-10 RX ADMIN — ACETAMINOPHEN 650 MG: 325 TABLET ORAL at 11:43

## 2023-10-10 RX ADMIN — ENOXAPARIN SODIUM 40 MG: 100 INJECTION SUBCUTANEOUS at 09:39

## 2023-10-10 RX ADMIN — Medication 100 MG: at 09:29

## 2023-10-10 RX ADMIN — CARVEDILOL 3.12 MG: 3.12 TABLET, FILM COATED ORAL at 16:41

## 2023-10-10 ASSESSMENT — PAIN DESCRIPTION - LOCATION: LOCATION: HEAD

## 2023-10-10 ASSESSMENT — PAIN DESCRIPTION - ORIENTATION: ORIENTATION: ANTERIOR

## 2023-10-10 ASSESSMENT — LIFESTYLE VARIABLES
HOW OFTEN DO YOU HAVE A DRINK CONTAINING ALCOHOL: 2-4 TIMES A MONTH
HOW MANY STANDARD DRINKS CONTAINING ALCOHOL DO YOU HAVE ON A TYPICAL DAY: 1 OR 2

## 2023-10-10 ASSESSMENT — PAIN SCALES - GENERAL
PAINLEVEL_OUTOF10: 0
PAINLEVEL_OUTOF10: 3

## 2023-10-10 ASSESSMENT — PAIN DESCRIPTION - DESCRIPTORS: DESCRIPTORS: ACHING

## 2023-10-10 NOTE — PROCEDURES
EEG REPORT       Patient: Katia Moody Age: 69 y.o. MRN: [id number]      Referring Provider: Samantha Cobos PA-C    History: This routine 30 minute scalp EEG was recorded with video- monitoring for a 69 y.o.. male  who presented with encephalopathy. This EEG was performed to evaluate for focal and epileptiform abnormalities. Katia Moody   Current Facility-Administered Medications   Medication Dose Route Frequency Provider Last Rate Last Admin    sodium chloride flush 0.9 % injection 5-40 mL  5-40 mL IntraVENous 2 times per day Samantha Cobos PA-C   10 mL at 10/09/23 1929    sodium chloride flush 0.9 % injection 5-40 mL  5-40 mL IntraVENous PRN Roderick Benavides PA-C        0.9 % sodium chloride infusion   IntraVENous PRN Roderick Benavides PA-C        enoxaparin (LOVENOX) injection 40 mg  40 mg SubCUTAneous Daily Roderick Benavides PA-C   40 mg at 10/10/23 0939    ondansetron (ZOFRAN-ODT) disintegrating tablet 4 mg  4 mg Oral Q8H PRN Rodercik Benavides PA-C        Or    ondansetron (ZOFRAN) injection 4 mg  4 mg IntraVENous Q6H PRN Roderick Benavides PA-C        polyethylene glycol (GLYCOLAX) packet 17 g  17 g Oral Daily PRN Roderick Benavides PA-C        acetaminophen (TYLENOL) tablet 650 mg  650 mg Oral Q6H PRN Roderick Benavides PA-C   650 mg at 10/09/23 1929    Or    acetaminophen (TYLENOL) suppository 650 mg  650 mg Rectal Q6H PRN Roderick Benavides PA-C        nicotine (NICODERM CQ) 7 MG/24HR 1 patch  1 patch TransDERmal Daily Roderick Benavides PA-C   1 patch at 10/09/23 1929    thiamine tablet 100 mg  100 mg Oral Daily Roderick Benavides PA-C   100 mg at 10/10/23 0214    multivitamin 1 tablet  1 tablet Oral Daily Roderick Benavides PA-C   1 tablet at 10/10/23 1289        Technical Description: This is a 21 channel digital EEG recording with time-locked video. Electrodes were placed in accordance with the 10-20 International System of Electrode Placement.

## 2023-10-10 NOTE — PLAN OF CARE
Problem: Discharge Planning  Goal: Discharge to home or other facility with appropriate resources  Outcome: Progressing     Problem: Pain  Goal: Verbalizes/displays adequate comfort level or baseline comfort level  Outcome: Progressing     Problem: Safety - Adult  Goal: Free from fall injury  Outcome: Progressing     Problem: Skin/Tissue Integrity  Goal: Absence of new skin breakdown  Description: 1. Monitor for areas of redness and/or skin breakdown  2. Assess vascular access sites hourly  3. Every 4-6 hours minimum:  Change oxygen saturation probe site  4. Every 4-6 hours:  If on nasal continuous positive airway pressure, respiratory therapy assess nares and determine need for appliance change or resting period. Outcome: Progressing    Care plan reviewed with patient. Patient verbalize understanding of the plan of care and contribute to goal setting.

## 2023-10-10 NOTE — CONSULTS
[facility] [address] [place] [address]    Patient's Name/Date of Birth: Miri Roe / 1955 (59 y.o.)    Date: October 10, 2023     Referring Provider: James Ferrara MD    CHIEF COMPLAINT: Syncope      HPI: This is a pleasant 69 y.o. male with a PMH pertinent for dilated cardiomyopathy suspected to be 2/2 ETOH abuse, HFrEF, hypertension, history of CVA, substance abuse, ashtma and history of CKD w/ acute HD being required that presented to [facility] on 10/9/23 for further evaluation of syncopal event. Patient reports that he was sitting down drinking coffee and the next thing he knows he was on the ground and he was being helped up. Patient does not remember falling down, no palpitations, dizziness, or lightheadedness before hand. Patient states before this happened he was feeling fatigued and was going to lay down. He states that he did have incontinece wit this syncopal episode and denies and previous simiiar events. Patient recalls everything that happened after he passed out. Patient states that he spent majority of yesterday walking around outside. Patient states that he did eat 3 pieces of chicken yesterday and did drink water throughout the day. He did smoke a cigarette and have 1 beer before sitting down to drink his coffee when this happened. Patient states that he did have some right sided chest tightness that worsens with taking a deep breath. Patient only locates this chest tightness to the right sided of his chest. This chest discomfort does not radiate into his back, up into his jaw or down either of his arms. Patient denies substernal chest tightness, pressure or pain. Patient has no associated SOB, nausea, vomiting, diaphoresis, or abdominal pain. Patient notes this right sided chest tightness has been present for the past month with no change in intensity. Patient reports that he has not been taking any medications, he currently is only taking tylenol as needed.  Patient was

## 2023-10-11 ENCOUNTER — TELEPHONE (OUTPATIENT)
Dept: CARDIOLOGY CLINIC | Age: 68
End: 2023-10-11

## 2023-10-11 VITALS
WEIGHT: 140 LBS | TEMPERATURE: 98 F | SYSTOLIC BLOOD PRESSURE: 147 MMHG | HEART RATE: 72 BPM | BODY MASS INDEX: 21.22 KG/M2 | HEIGHT: 68 IN | RESPIRATION RATE: 16 BRPM | OXYGEN SATURATION: 100 % | DIASTOLIC BLOOD PRESSURE: 88 MMHG

## 2023-10-11 LAB
CHOLEST SERPL-MCNC: 127 MG/DL (ref 100–199)
HDLC SERPL-MCNC: 60 MG/DL
LDLC SERPL CALC-MCNC: 54 MG/DL
TRIGL SERPL-MCNC: 65 MG/DL (ref 0–199)

## 2023-10-11 PROCEDURE — 93270 REMOTE 30 DAY ECG REV/REPORT: CPT

## 2023-10-11 PROCEDURE — 6370000000 HC RX 637 (ALT 250 FOR IP)

## 2023-10-11 PROCEDURE — 2580000003 HC RX 258: Performed by: PHYSICIAN ASSISTANT

## 2023-10-11 PROCEDURE — 99239 HOSP IP/OBS DSCHRG MGMT >30: CPT | Performed by: HOSPITALIST

## 2023-10-11 PROCEDURE — 93306 TTE W/DOPPLER COMPLETE: CPT

## 2023-10-11 PROCEDURE — 6370000000 HC RX 637 (ALT 250 FOR IP): Performed by: HOSPITALIST

## 2023-10-11 PROCEDURE — 6360000002 HC RX W HCPCS: Performed by: PHYSICIAN ASSISTANT

## 2023-10-11 PROCEDURE — 96372 THER/PROPH/DIAG INJ SC/IM: CPT

## 2023-10-11 PROCEDURE — 6370000000 HC RX 637 (ALT 250 FOR IP): Performed by: PHYSICIAN ASSISTANT

## 2023-10-11 PROCEDURE — G0378 HOSPITAL OBSERVATION PER HR: HCPCS

## 2023-10-11 PROCEDURE — 80061 LIPID PANEL: CPT

## 2023-10-11 RX ORDER — LOSARTAN POTASSIUM 50 MG/1
50 TABLET ORAL ONCE
Status: COMPLETED | OUTPATIENT
Start: 2023-10-11 | End: 2023-10-11

## 2023-10-11 RX ORDER — LOSARTAN POTASSIUM 50 MG/1
50 TABLET ORAL DAILY
Qty: 30 TABLET | Refills: 0 | Status: SHIPPED | OUTPATIENT
Start: 2023-10-11 | End: 2023-11-10

## 2023-10-11 RX ADMIN — Medication 1 TABLET: at 09:39

## 2023-10-11 RX ADMIN — LOSARTAN POTASSIUM 50 MG: 50 TABLET, FILM COATED ORAL at 16:40

## 2023-10-11 RX ADMIN — ASPIRIN 81 MG CHEWABLE TABLET 81 MG: 81 TABLET CHEWABLE at 09:38

## 2023-10-11 RX ADMIN — ENOXAPARIN SODIUM 40 MG: 100 INJECTION SUBCUTANEOUS at 10:18

## 2023-10-11 RX ADMIN — Medication 100 MG: at 09:39

## 2023-10-11 RX ADMIN — SODIUM CHLORIDE, PRESERVATIVE FREE 10 ML: 5 INJECTION INTRAVENOUS at 09:32

## 2023-10-11 NOTE — TELEPHONE ENCOUNTER
I called [facility] and talked to [facility]. Pt has a phone but he is unsure what his number is. His phone is currently dead and don't know where his  is located. Elli-nurse states she will have him call our office so we can get a phone number in the chart.

## 2023-10-11 NOTE — PROGRESS NOTES
[address]   INPATIENT PHYSICAL THERAPY  EVALUATION  [facility] MED SURG 8A - 8A-08/008-[id number]    Time In: 3337  Time Out: 3829  Timed Code Treatment Minutes: 15 Minutes  Minutes: 23          Date: 10/10/2023  Patient Name: Paul Bro,  Gender:  male        MRN: [id number]  : 1955  (69 y.o.)      Referring Practitioner: Shanthi Thurman PA-C  Diagnosis: Syncope and collapse  Additional Pertinent Hx: 69 y.o. male with a history of dilated cardiomyopathy, chronic systolic heart failure, cocaine abuse, and tobacco use disorder who presented to [facility] with chief complaint of syncope and collapse. The patient states today he was at the soup kitchen when he had a sudden loss of consciousness. He was sitting when this occurred. He denies any specific prodrome. He does not have a history of recurrent syncope. Is unclear whether or not there was any associated seizure activity and it is unknown how long the patient was unconscious for. When he awoke, he reported that he felt \"fuzzy. \"In the emergency department, he continued to endorse feeling dizzy and lightheaded. Restrictions/Precautions:  Restrictions/Precautions: General Precautions, Fall Risk    Subjective:  Chart Reviewed: Yes  Patient assessed for rehabilitation services?: Yes  Subjective: RN approved session.  Pt pleasant and agreeable to therapy    General:  Overall Orientation Status: Within Functional Limits  Vision: Within Functional Limits  Hearing: Within functional limits       Pain: 5/10: headache     Vitals: Vitals not assessed per clinical judgement, see nursing flowsheet    Social/Functional History:    Lives With: Alone  Type of Home: Apartment  Home Layout: One level  Home Access: Elevator  Home Equipment: None     Bathroom Shower/Tub: Walk-in shower  Bathroom Toilet: Standard       ADL Assistance: Independent  Homemaking Assistance: Independent  Ambulation Assistance: Independent  Transfer Assistance: Independent    Active
Advance Care Planning     Advance Care Planning Inpatient Note  [facility] Department    Today's Date: 10/9/2023  Unit: [facility] MED SURG 8A    Received request from admission screening. Upon review of chart and communication with care team, patient's decision making abilities are not in question. . Patient was/were present in the room during visit. Goals of ACP Conversation:  Discuss advance care planning documents    Health Care Decision Makers:     No healthcare decision makers have been documented. Click here to complete [address] including selection of the Healthcare Decision Maker Relationship (ie \"Primary\")  Summary:  No Decision Maker named by patient at this time    Advance Care Planning Documents (Patient Wishes):  None     Assessment:  Josh Haddad is being cared for on 8A for syncope and collapse. At the time of my visit, he was sitting in a chair and eating dinner. He declined an ACP conversation this evening, but was open to printed information that he said he would look over at another time.     Interventions:  Patient DECLINED ACP conversation    Care Preferences Communicated:   No    Outcomes/Plan:  ACP Discussion: Refused    Electronically signed by Patricia Strickland, [address] [place] [address] on 10/9/2023 at 6:54 PM
CLINICAL PHARMACY: DISCHARGE MED RECONCILIATION/REVIEW    [facility] ([facility]) Select Patient?: No  Total # of Interventions Recommended: 0  Total # Interventions Accepted: 0  Intervention Severity:   - Level 1 Intervention Present?: No   - Level 2 #: 0   - Level 3 #: 0   Time Spent (min): Lottie DupontD   Pharmacy Resident  10/11/2023 3:20 PM
Echo complete   Dr Eze Castellanos to read
Educated patient regarding heart failure management by explaining the importance of obtaining daily weights at the same time every day with approximately the same amount of clothing, how to recognize symptoms, low sodium diet and taking prescribed medications as ordered. Patient was given our heart failure booklet. Patient was receptive to the education given and verbalized understanding.
Patient is sitting in bed with call light in reach. I told him \"someone from the care team will be in within the hour and to use his call light for emergencies. \" He voices no pain or concerns. Report given to primary nurse, Sara Sandoval.  Roc DIAZ/C
Patient is sitting in bed with call light in reach. Patient voices no concerns or pain at present. I told the patient someone from the care team will be back within the hour and to use his call for emergencies.  SN Roc/EMBER
Patient is sitting in bed with the call light in reach. Patient voices no concerns or needs at present. I told the patient someone from the care team will be in within the hour and to use his call lights for emergencies.  SN Mina/EMBER
Patient provided with discharge instructions, states verbal understanding. Ready for discharge. Transport requested.
Presented to pt room for completion of AOD consult. Pt state he has had multiple tests and providers in this morning and would like time to rest before completing. WAQAR to re-attempt.
Writer in for head to toe assessment. Pt. Returning to bed at this time from small walk in the hallway. VS obtained. /92 P 72 Spo2 100% on RA T 97.6 R16. Alert and oriented x4. Lung sounds clear/dim. BSAx4. Skin warm and dry. Denies c/o pain. No further issues noted at present time.
Hemodialysis patient [facility])     previous    Hypertension        Scheduled Meds:   sodium chloride flush  5-40 mL IntraVENous 2 times per day    enoxaparin  40 mg SubCUTAneous Daily    nicotine  1 patch TransDERmal Daily    thiamine  100 mg Oral Daily    multivitamin  1 tablet Oral Daily     Continuous Infusions:   sodium chloride       PRN Meds:.sodium chloride flush, sodium chloride, ondansetron **OR** ondansetron, polyethylene glycol, acetaminophen **OR** acetaminophen    Technician: Jennifer Marshall 10/10/2023
stenosis. The left neural foramen is patent. At C3-4, there is mild canal, moderate right and mild-to-moderate left-sided foraminal stenosis. At C4-5, there is mild canal and mild-to-moderate bilateral foraminal stenosis. At C5-6, there is mild canal, moderate to severe right and mild left-sided foraminal stenosis. At C6-7, there is mild canal, moderate right and mild left-sided foraminal stenosis. At C7-T1, there is mild canal and mild-to-moderate bilateral foraminal stenosis. There is carotid artery calcification especially on the left side. There is increased soft tissue density in the nasopharynx consistent with enlarged adenoids. .  There are no suspicious findings in the lung apices. 1. Straightening of the normal cervical lordosis with superimposed degenerative changes at C4-5, C5-6 and C6-7. 2. No acute fracture noted. **This report has been created using voice recognition software. It may contain minor errors which are inherent in voice recognition technology. ** Final report electronically signed by DR Isra Anaya on 10/9/2023 3:14 PM    CT HEAD WO CONTRAST    Result Date: 10/9/2023  PROCEDURE: CT HEAD WO CONTRAST CLINICAL INFORMATION: fall, syncope. COMPARISON: No prior study. TECHNIQUE: Noncontrast 5 mm axial images were obtained through the brain. Sagittal and coronal reconstructions were obtained. All CT scans at this facility use dose modulation, iterative reconstruction, and/or weight-based dosing when appropriate to reduce radiation dose to as low as reasonably achievable. FINDINGS: There is mild atrophy and dilatation of the third and lateral ventricles. There is diminished attenuation in the white matter most likely representing ischemic changes There is no hemorrhage. There are no intra-or extra-axial collections. There is no  midline shift or mass effect. The paranasal sinuses and mastoid air cells are normally aerated. There is no suspicious calvarial abnormality.   There is slightly

## 2023-10-11 NOTE — TELEPHONE ENCOUNTER
Chandrika called to schedule a hosp f/u with Wayne Burgos in 5 wks for Marj Perdomo. DC 10-11 evening, dx-syncope. Marj Perdomo would be new to office Wayne Burgos seen in [id number] [facility] [address], no number in epic for Marj Night, might have to call hospital to get an appt scheduled.

## 2023-10-11 NOTE — DISCHARGE SUMMARY
marijuana. He does continue to smoke and smokes a few cigarettes a day. Hospital Course:  69year old male admitted after a syncopal episode. Patient has a history of cardiomyopathy with ejection fraction 35 to 40% 2018. Patient admits he has been noncompliant not taking any medication for this. Patient also drinks alcohol smokes cigarettes and had recent cocaine use. Patient was admitted for syncopal work-up, CT head was negative, EEG was negative for seizures. Patient was seen by cardiology and had a repeat 2D echo which showed improved EF of 55 to 60% but with dilated RV and LV. Patient is being discharged today with Rx for losartan for elevated blood pressures. He is also being discharged with 30-day event monitor and is to follow-up with cardiology in 5 weeks. Assessment/Plan:     Syncope and collapse:  no arrhythmias noted on telemetry overnight. EEG negative. Dc home with 30 day event monitor, follow up with cardiology in 5 weeks  Dilated cardiomyopathy:  last known EF 35-40%. Repeat echo pending. Cardiology consulted, patient is not on any meds. Does not appear to be in fluid overload. Repeat echo shows EF of 55-60%.     Cocaine abuse:  cessation advised, avoid BB  MICKY:  Cr improved, continue to monitor      Consultants:  Patient Care Team:  Abeba Oleary MD as PCP - General (Internal Medicine)    Discharge Medications:       Medication List        START taking these medications      losartan 50 MG tablet  Commonly known as: COZAAR  Take 1 tablet by mouth daily               Where to Get Your Medications        These medications were sent to [address] [place] [address] [facility], [address] [place] [address] [address] [address]      Phone: [phone]   losartan 50 MG tablet           Physical Exam:    Vitals:  Vitals:    10/11/23 0104 10/11/23 0345 10/11/23 0815 10/11/23 1130   BP: (!) 160/104 (!) 158/91 (!)

## 2023-10-11 NOTE — CARE COORDINATION
10/11/23, 2:51 PM EDT    Patient goals/plan/ treatment preferences discussed by  and . Patient goals/plan/ treatment preferences reviewed with patient/ family. Patient/ family verbalize understanding of discharge plan and are in agreement with goal/plan/treatment preferences. Understanding was demonstrated using the teach back method. AVS provided by RN at time of discharge, which includes all necessary medical information pertaining to the patients current course of illness, treatment, post-discharge goals of care, and treatment preferences.      Services At/After Discharge: None       IMM Letter  Observation Status Letter date given[de-identified] 10/09/23  Observation Status Letter time given[de-identified] 1046  Observation Status Letter given to Patient/Family/Significant other/Guardian/POA/by[de-identified] patient registration
for information as patient does not seem keen on the idea. Transportation/Food Security/Housekeeping Addressed: No issues identified.      Keven Graf RN  Case Management Department

## 2023-10-12 ENCOUNTER — TELEPHONE (OUTPATIENT)
Dept: FAMILY MEDICINE CLINIC | Age: 68
End: 2023-10-12

## 2023-10-12 NOTE — TELEPHONE ENCOUNTER
Care Transitions Initial Follow Up Call    Outreach made within 2 business days of discharge: Yes    Patient: Cyn Givens Patient : 1955   MRN: [id number]  Reason for Admission: There are no discharge diagnoses documented for the most recent discharge. Discharge Date: 10/11/23       Spoke with: No current phone number on file.     Discharge department/facility: [facility]      Scheduled appointment with PCP within 7-14 days    Follow Up  Future Appointments   Date Time Provider [address] [place] [address]   10/18/2023  1:40 PM Hyacinth Kruse DO SRPX Physic 1 [facility]   10/19/2023  1:00 PM MORRIS Alford - CNP N SRPX CHF [facility]   11/15/2023 11:00 AM Evan Michelle PA-C N  Jennifer Mckenna, Taisha Arcos

## 2023-10-13 NOTE — TELEPHONE ENCOUNTER
Care Transitions Initial Follow Up Call    Outreach made within 2 business days of discharge: Yes    Patient: Paul Bro Patient : 1955   MRN: [id number]  Reason for Admission: There are no discharge diagnoses documented for the most recent discharge. Discharge Date: 10/11/23       Spoke with: No current phone number on file.     Discharge department/facility: [facility]      Scheduled appointment with PCP within 7-14 days    Follow Up  Future Appointments   Date Time Provider [address] [place] [address]   10/18/2023  1:40 PM Nereyda Nicolas DO SRPX Physic [address] [place] [address]   10/19/2023  1:00 PM Slim Rodas APRN - CNP N SRPX [facility]   11/15/2023 11:00 AM NURIS Gonsales  Jennifer Mckenna, [place]

## 2023-10-17 ENCOUNTER — HOSPITAL ENCOUNTER (INPATIENT)
Age: 68
LOS: 2 days | Discharge: LEFT BEFORE DISCHARGE | DRG: 894 | End: 2023-10-19
Attending: INTERNAL MEDICINE | Admitting: INTERNAL MEDICINE
Payer: MEDICARE

## 2023-10-17 PROBLEM — F10.939 ALCOHOL WITHDRAWAL SYNDROME, WITH UNSPECIFIED COMPLICATION (HCC): Status: ACTIVE | Noted: 2023-10-17

## 2023-10-17 PROBLEM — F10.20: Status: ACTIVE | Noted: 2023-10-17

## 2023-10-17 LAB
ALBUMIN SERPL BCG-MCNC: 3.8 G/DL (ref 3.5–5.1)
ALP SERPL-CCNC: 68 U/L (ref 38–126)
ALT SERPL W/O P-5'-P-CCNC: 69 U/L (ref 11–66)
ANION GAP SERPL CALC-SCNC: 8 MEQ/L (ref 8–16)
AST SERPL-CCNC: 91 U/L (ref 5–40)
BILIRUB CONJ SERPL-MCNC: < 0.2 MG/DL (ref 0–0.3)
BILIRUB SERPL-MCNC: 0.3 MG/DL (ref 0.3–1.2)
BUN SERPL-MCNC: 11 MG/DL (ref 7–22)
CALCIUM SERPL-MCNC: 8.8 MG/DL (ref 8.5–10.5)
CHLORIDE SERPL-SCNC: 96 MEQ/L (ref 98–111)
CO2 SERPL-SCNC: 26 MEQ/L (ref 23–33)
CREAT SERPL-MCNC: 1 MG/DL (ref 0.4–1.2)
DEPRECATED RDW RBC AUTO: 50.5 FL (ref 35–45)
ERYTHROCYTE [DISTWIDTH] IN BLOOD BY AUTOMATED COUNT: 13.2 % (ref 11.5–14.5)
ETHANOL SERPL-MCNC: < 0.01 %
GFR SERPL CREATININE-BSD FRML MDRD: > 60 ML/MIN/1.73M2
GLUCOSE SERPL-MCNC: 95 MG/DL (ref 70–108)
HCT VFR BLD AUTO: 38.1 % (ref 42–52)
HGB BLD-MCNC: 12.7 GM/DL (ref 14–18)
MAGNESIUM SERPL-MCNC: 1.9 MG/DL (ref 1.6–2.4)
MCH RBC QN AUTO: 34.3 PG (ref 26–33)
MCHC RBC AUTO-ENTMCNC: 33.3 GM/DL (ref 32.2–35.5)
MCV RBC AUTO: 103 FL (ref 80–94)
PLATELET # BLD AUTO: 192 THOU/MM3 (ref 130–400)
PMV BLD AUTO: 10.3 FL (ref 9.4–12.4)
POTASSIUM SERPL-SCNC: 4.4 MEQ/L (ref 3.5–5.2)
PROT SERPL-MCNC: 7.5 G/DL (ref 6.1–8)
RBC # BLD AUTO: 3.7 MILL/MM3 (ref 4.7–6.1)
SODIUM SERPL-SCNC: 130 MEQ/L (ref 135–145)
WBC # BLD AUTO: 3.8 THOU/MM3 (ref 4.8–10.8)

## 2023-10-17 PROCEDURE — 83735 ASSAY OF MAGNESIUM: CPT

## 2023-10-17 PROCEDURE — 6360000002 HC RX W HCPCS

## 2023-10-17 PROCEDURE — 2580000003 HC RX 258: Performed by: STUDENT IN AN ORGANIZED HEALTH CARE EDUCATION/TRAINING PROGRAM

## 2023-10-17 PROCEDURE — 80053 COMPREHEN METABOLIC PANEL: CPT

## 2023-10-17 PROCEDURE — 99222 1ST HOSP IP/OBS MODERATE 55: CPT | Performed by: INTERNAL MEDICINE

## 2023-10-17 PROCEDURE — 85027 COMPLETE CBC AUTOMATED: CPT

## 2023-10-17 PROCEDURE — 6370000000 HC RX 637 (ALT 250 FOR IP)

## 2023-10-17 PROCEDURE — 82077 ASSAY SPEC XCP UR&BREATH IA: CPT

## 2023-10-17 PROCEDURE — 36415 COLL VENOUS BLD VENIPUNCTURE: CPT

## 2023-10-17 PROCEDURE — 1200000003 HC TELEMETRY R&B

## 2023-10-17 PROCEDURE — 82248 BILIRUBIN DIRECT: CPT

## 2023-10-17 PROCEDURE — 2580000003 HC RX 258

## 2023-10-17 RX ORDER — SODIUM CHLORIDE 0.9 % (FLUSH) 0.9 %
5-40 SYRINGE (ML) INJECTION EVERY 12 HOURS SCHEDULED
Status: DISCONTINUED | OUTPATIENT
Start: 2023-10-17 | End: 2023-10-19 | Stop reason: HOSPADM

## 2023-10-17 RX ORDER — ACETAMINOPHEN 325 MG/1
650 TABLET ORAL EVERY 6 HOURS PRN
Status: DISCONTINUED | OUTPATIENT
Start: 2023-10-17 | End: 2023-10-19 | Stop reason: HOSPADM

## 2023-10-17 RX ORDER — PHENOBARBITAL SODIUM 65 MG/ML
130 INJECTION INTRAMUSCULAR
Status: DISCONTINUED | OUTPATIENT
Start: 2023-10-17 | End: 2023-10-19 | Stop reason: ALTCHOICE

## 2023-10-17 RX ORDER — POLYETHYLENE GLYCOL 3350 17 G/17G
17 POWDER, FOR SOLUTION ORAL DAILY PRN
Status: DISCONTINUED | OUTPATIENT
Start: 2023-10-17 | End: 2023-10-19 | Stop reason: HOSPADM

## 2023-10-17 RX ORDER — FOLIC ACID 1 MG/1
1 TABLET ORAL DAILY
Status: DISCONTINUED | OUTPATIENT
Start: 2023-10-17 | End: 2023-10-19 | Stop reason: HOSPADM

## 2023-10-17 RX ORDER — SODIUM CHLORIDE 9 MG/ML
INJECTION, SOLUTION INTRAVENOUS CONTINUOUS
Status: DISCONTINUED | OUTPATIENT
Start: 2023-10-17 | End: 2023-10-19

## 2023-10-17 RX ORDER — PANTOPRAZOLE SODIUM 40 MG/1
40 TABLET, DELAYED RELEASE ORAL
Status: DISCONTINUED | OUTPATIENT
Start: 2023-10-18 | End: 2023-10-19 | Stop reason: HOSPADM

## 2023-10-17 RX ORDER — NICOTINE 21 MG/24HR
1 PATCH, TRANSDERMAL 24 HOURS TRANSDERMAL DAILY
Status: DISCONTINUED | OUTPATIENT
Start: 2023-10-17 | End: 2023-10-19 | Stop reason: HOSPADM

## 2023-10-17 RX ORDER — ONDANSETRON 2 MG/ML
4 INJECTION INTRAMUSCULAR; INTRAVENOUS EVERY 6 HOURS PRN
Status: DISCONTINUED | OUTPATIENT
Start: 2023-10-17 | End: 2023-10-19 | Stop reason: HOSPADM

## 2023-10-17 RX ORDER — ENOXAPARIN SODIUM 100 MG/ML
40 INJECTION SUBCUTANEOUS DAILY
Status: DISCONTINUED | OUTPATIENT
Start: 2023-10-17 | End: 2023-10-19 | Stop reason: HOSPADM

## 2023-10-17 RX ORDER — PHENOBARBITAL SODIUM 65 MG/ML
260 INJECTION INTRAMUSCULAR
Status: DISCONTINUED | OUTPATIENT
Start: 2023-10-17 | End: 2023-10-19 | Stop reason: ALTCHOICE

## 2023-10-17 RX ORDER — MULTIVITAMIN WITH IRON
1 TABLET ORAL DAILY
Status: DISCONTINUED | OUTPATIENT
Start: 2023-10-17 | End: 2023-10-19 | Stop reason: HOSPADM

## 2023-10-17 RX ORDER — SODIUM CHLORIDE 0.9 % (FLUSH) 0.9 %
5-40 SYRINGE (ML) INJECTION PRN
Status: DISCONTINUED | OUTPATIENT
Start: 2023-10-17 | End: 2023-10-19 | Stop reason: HOSPADM

## 2023-10-17 RX ORDER — MAGNESIUM SULFATE 1 G/100ML
1000 INJECTION INTRAVENOUS ONCE
Status: COMPLETED | OUTPATIENT
Start: 2023-10-17 | End: 2023-10-17

## 2023-10-17 RX ORDER — ACETAMINOPHEN 650 MG/1
650 SUPPOSITORY RECTAL EVERY 6 HOURS PRN
Status: DISCONTINUED | OUTPATIENT
Start: 2023-10-17 | End: 2023-10-19 | Stop reason: HOSPADM

## 2023-10-17 RX ORDER — ONDANSETRON 4 MG/1
4 TABLET, ORALLY DISINTEGRATING ORAL EVERY 8 HOURS PRN
Status: DISCONTINUED | OUTPATIENT
Start: 2023-10-17 | End: 2023-10-19 | Stop reason: HOSPADM

## 2023-10-17 RX ORDER — LANOLIN ALCOHOL/MO/W.PET/CERES
100 CREAM (GRAM) TOPICAL DAILY
Status: DISCONTINUED | OUTPATIENT
Start: 2023-10-17 | End: 2023-10-19 | Stop reason: HOSPADM

## 2023-10-17 RX ORDER — SODIUM CHLORIDE 9 MG/ML
INJECTION, SOLUTION INTRAVENOUS PRN
Status: DISCONTINUED | OUTPATIENT
Start: 2023-10-17 | End: 2023-10-19 | Stop reason: HOSPADM

## 2023-10-17 RX ADMIN — SODIUM CHLORIDE, PRESERVATIVE FREE 10 ML: 5 INJECTION INTRAVENOUS at 19:55

## 2023-10-17 RX ADMIN — DILTIAZEM HYDROCHLORIDE 30 MG: 30 TABLET, FILM COATED ORAL at 21:33

## 2023-10-17 RX ADMIN — SODIUM CHLORIDE: 9 INJECTION, SOLUTION INTRAVENOUS at 18:28

## 2023-10-17 RX ADMIN — Medication 100 MG: at 21:33

## 2023-10-17 RX ADMIN — ACETAMINOPHEN 650 MG: 325 TABLET ORAL at 19:54

## 2023-10-17 RX ADMIN — ENOXAPARIN SODIUM 40 MG: 100 INJECTION SUBCUTANEOUS at 18:41

## 2023-10-17 RX ADMIN — FOLIC ACID 1 MG: 1 TABLET ORAL at 21:33

## 2023-10-17 RX ADMIN — MAGNESIUM SULFATE HEPTAHYDRATE 1000 MG: 1 INJECTION, SOLUTION INTRAVENOUS at 21:31

## 2023-10-17 RX ADMIN — Medication 1 TABLET: at 21:33

## 2023-10-17 ASSESSMENT — PAIN DESCRIPTION - LOCATION
LOCATION: HEAD
LOCATION: HEAD

## 2023-10-17 ASSESSMENT — PAIN SCALES - GENERAL
PAINLEVEL_OUTOF10: 3
PAINLEVEL_OUTOF10: 3
PAINLEVEL_OUTOF10: 0
PAINLEVEL_OUTOF10: 0

## 2023-10-17 ASSESSMENT — PAIN DESCRIPTION - DESCRIPTORS
DESCRIPTORS: ACHING
DESCRIPTORS: ACHING

## 2023-10-17 ASSESSMENT — PAIN - FUNCTIONAL ASSESSMENT: PAIN_FUNCTIONAL_ASSESSMENT: ACTIVITIES ARE NOT PREVENTED

## 2023-10-18 LAB
ANION GAP SERPL CALC-SCNC: 11 MEQ/L (ref 8–16)
APTT PPP: 33.8 SECONDS (ref 22–38)
BUN SERPL-MCNC: 11 MG/DL (ref 7–22)
CALCIUM SERPL-MCNC: 8.3 MG/DL (ref 8.5–10.5)
CHLORIDE SERPL-SCNC: 96 MEQ/L (ref 98–111)
CO2 SERPL-SCNC: 25 MEQ/L (ref 23–33)
CREAT SERPL-MCNC: 0.9 MG/DL (ref 0.4–1.2)
FOLATE SERPL-MCNC: > 20 NG/ML (ref 4.8–24.2)
GFR SERPL CREATININE-BSD FRML MDRD: > 60 ML/MIN/1.73M2
GLUCOSE SERPL-MCNC: 135 MG/DL (ref 70–108)
INR PPP: 1.01 (ref 0.85–1.13)
POTASSIUM SERPL-SCNC: 3.9 MEQ/L (ref 3.5–5.2)
SODIUM SERPL-SCNC: 132 MEQ/L (ref 135–145)
VIT B12 SERPL-MCNC: 627 PG/ML (ref 211–911)

## 2023-10-18 PROCEDURE — 2580000003 HC RX 258

## 2023-10-18 PROCEDURE — 85610 PROTHROMBIN TIME: CPT

## 2023-10-18 PROCEDURE — 6370000000 HC RX 637 (ALT 250 FOR IP)

## 2023-10-18 PROCEDURE — 80048 BASIC METABOLIC PNL TOTAL CA: CPT

## 2023-10-18 PROCEDURE — 6360000002 HC RX W HCPCS: Performed by: NURSE PRACTITIONER

## 2023-10-18 PROCEDURE — 82607 VITAMIN B-12: CPT

## 2023-10-18 PROCEDURE — 6370000000 HC RX 637 (ALT 250 FOR IP): Performed by: NURSE PRACTITIONER

## 2023-10-18 PROCEDURE — 2580000003 HC RX 258: Performed by: NURSE PRACTITIONER

## 2023-10-18 PROCEDURE — 6360000002 HC RX W HCPCS

## 2023-10-18 PROCEDURE — 99233 SBSQ HOSP IP/OBS HIGH 50: CPT | Performed by: NURSE PRACTITIONER

## 2023-10-18 PROCEDURE — 82746 ASSAY OF FOLIC ACID SERUM: CPT

## 2023-10-18 PROCEDURE — 36415 COLL VENOUS BLD VENIPUNCTURE: CPT

## 2023-10-18 PROCEDURE — 85730 THROMBOPLASTIN TIME PARTIAL: CPT

## 2023-10-18 PROCEDURE — 1200000003 HC TELEMETRY R&B

## 2023-10-18 RX ORDER — LOSARTAN POTASSIUM 25 MG/1
25 TABLET ORAL DAILY
Status: DISCONTINUED | OUTPATIENT
Start: 2023-10-18 | End: 2023-10-19 | Stop reason: HOSPADM

## 2023-10-18 RX ORDER — PHENOBARBITAL 32.4 MG/1
64.8 TABLET ORAL 2 TIMES DAILY
Status: COMPLETED | OUTPATIENT
Start: 2023-10-18 | End: 2023-10-19

## 2023-10-18 RX ORDER — PHENOBARBITAL 32.4 MG/1
32.4 TABLET ORAL 2 TIMES DAILY
Status: DISCONTINUED | OUTPATIENT
Start: 2023-10-19 | End: 2023-10-19 | Stop reason: HOSPADM

## 2023-10-18 RX ADMIN — PHENOBARBITAL SODIUM 130 MG: 65 INJECTION INTRAMUSCULAR at 00:02

## 2023-10-18 RX ADMIN — FOLIC ACID 1 MG: 1 TABLET ORAL at 07:30

## 2023-10-18 RX ADMIN — ENOXAPARIN SODIUM 40 MG: 100 INJECTION SUBCUTANEOUS at 07:28

## 2023-10-18 RX ADMIN — PANTOPRAZOLE SODIUM 40 MG: 40 TABLET, DELAYED RELEASE ORAL at 05:51

## 2023-10-18 RX ADMIN — PHENOBARBITAL SODIUM 130 MG: 65 INJECTION INTRAMUSCULAR at 05:44

## 2023-10-18 RX ADMIN — PHENOBARBITAL SODIUM 260.65 MG: 65 INJECTION INTRAMUSCULAR at 18:51

## 2023-10-18 RX ADMIN — LOSARTAN POTASSIUM 25 MG: 25 TABLET, FILM COATED ORAL at 07:54

## 2023-10-18 RX ADMIN — PHENOBARBITAL SODIUM 260.65 MG: 65 INJECTION INTRAMUSCULAR at 09:48

## 2023-10-18 RX ADMIN — SODIUM CHLORIDE, PRESERVATIVE FREE 10 ML: 5 INJECTION INTRAVENOUS at 22:30

## 2023-10-18 RX ADMIN — Medication 100 MG: at 07:30

## 2023-10-18 RX ADMIN — PHENOBARBITAL SODIUM 260.65 MG: 65 INJECTION INTRAMUSCULAR at 13:23

## 2023-10-18 RX ADMIN — DILTIAZEM HYDROCHLORIDE 30 MG: 30 TABLET, FILM COATED ORAL at 05:26

## 2023-10-18 RX ADMIN — Medication 1 TABLET: at 07:30

## 2023-10-18 RX ADMIN — PHENOBARBITAL 64.8 MG: 32.4 TABLET ORAL at 22:29

## 2023-10-18 ASSESSMENT — PAIN SCALES - GENERAL
PAINLEVEL_OUTOF10: 0

## 2023-10-18 NOTE — NURSE NAVIGATOR
Utilize [facility] alcohol withdrawal scale (Based on [place] Modified Alcohol Withdrawal Scale). Tabulate score based on classifications including Tremor, Sweating, Hallucination, Orientation, and Agitation. Tremor: 0  Sweatin  Hallucinations: 0  Orientation: 0  Agitation: 0  Total Score: 0  Action perform as described below     Tremor:  No tremor is 0 points. Tremor on movement is 1 point. Tremor at rest is 2 points. Sweating: No Sweat 0 points. Moist is 1 point. Drenching sweats is 2 points. Hallucinations: No present 0 points. Dissuadable is 1 point. Not dissuadable is 2 points. Orientation: Oriented 0 points. Vague/detached 1 point. Disoriented/no contact 2 points. Agitation: Calm 0 points. Anxious 1 point. Panicky 2 points. Check scale every 2 hours. Discontinue scoring with 4 consecutive scorings of 0. Scale 0: No phenobarbital given. Re-assess every 60 minutes as needed. Scale 1-3: Phenobarbital 130 mg IV over 3 minutes. Re-assess every 60 minutes as needed. May administer every 60 minutes to a maximum dose of phenobarbital 1040 mg in 24 hours! Scale 4-8: Phenobarbital 260 mg IV over 5 minutes. Re-assess every 60 minutes as needed. May administer every 60 minutes to a maximum dose of phenobarbital 1040mg in 24 hours! Scale 9-10: Transfer to ICU (if not already in ICU). Administer 10mg/kg phenobarbital IV over 60 minutes. Maximum dose phenobarbital is 1040mg in 24 hours!

## 2023-10-18 NOTE — NURSE NAVIGATOR
Utilize [facility] alcohol withdrawal scale (Based on [place] Modified Alcohol Withdrawal Scale). Tabulate score based on classifications including Tremor, Sweating, Hallucination, Orientation, and Agitation. Tremor: 0  Sweatin  Hallucinations: 0  Orientation: 0  Agitation: 0  Total Score: 0  Action perform as described below     Tremor:  No tremor is 0 points. Tremor on movement is 1 point. Tremor at rest is 2 points. Sweating: No Sweat 0 points. Moist is 1 point. Drenching sweats is 2 points. Hallucinations: No present 0 points. Dissuadable is 1 point. Not dissuadable is 2 points. Orientation: Oriented 0 points. Vague/detached 1 point. Disoriented/no contact 2 points. Agitation: Calm 0 points. Anxious 1 point. Panicky 2 points. Check scale every 2 hours. Discontinue scoring with 4 consecutive scorings of 0. Scale 0: No phenobarbital given. Re-assess every 60 minutes as needed. Scale 1-3: Phenobarbital 130 mg IV over 3 minutes. Re-assess every 60 minutes as needed. May administer every 60 minutes to a maximum dose of phenobarbital 1040 mg in 24 hours! Scale 4-8: Phenobarbital 260 mg IV over 5 minutes. Re-assess every 60 minutes as needed. May administer every 60 minutes to a maximum dose of phenobarbital 1040mg in 24 hours! Scale 9-10: Transfer to ICU (if not already in ICU). Administer 10mg/kg phenobarbital IV over 60 minutes. Maximum dose phenobarbital is 1040mg in 24 hours!

## 2023-10-18 NOTE — NURSE NAVIGATOR
Utilize [facility] alcohol withdrawal scale (Based on [place] Modified Alcohol Withdrawal Scale). Tabulate score based on classifications including Tremor, Sweating, Hallucination, Orientation, and Agitation. Tremor: 0  Sweatin  Hallucinations: 1  Orientation: 0  Agitation: 1  Total Score: 2  Action perform as described below     Tremor:  No tremor is 0 points. Tremor on movement is 1 point. Tremor at rest is 2 points. Sweating: No Sweat 0 points. Moist is 1 point. Drenching sweats is 2 points. Hallucinations: No present 0 points. Dissuadable is 1 point. Not dissuadable is 2 points. Orientation: Oriented 0 points. Vague/detached 1 point. Disoriented/no contact 2 points. Agitation: Calm 0 points. Anxious 1 point. Panicky 2 points. Check scale every 2 hours. Discontinue scoring with 4 consecutive scorings of 0. Scale 0: No phenobarbital given. Re-assess every 60 minutes as needed. Scale 1-3: Phenobarbital 130 mg IV over 3 minutes. Re-assess every 60 minutes as needed. May administer every 60 minutes to a maximum dose of phenobarbital 1040 mg in 24 hours! Scale 4-8: Phenobarbital 260 mg IV over 5 minutes. Re-assess every 60 minutes as needed. May administer every 60 minutes to a maximum dose of phenobarbital 1040mg in 24 hours! Scale 9-10: Transfer to ICU (if not already in ICU). Administer 10mg/kg phenobarbital IV over 60 minutes. Maximum dose phenobarbital is 1040mg in 24 hours!

## 2023-10-18 NOTE — PLAN OF CARE
Problem: Discharge Planning  Goal: Discharge to home or other facility with appropriate resources  Outcome: Progressing  Flowsheets  Taken 10/17/2023 1945 by Ria Ceballos RN  Discharge to home or other facility with appropriate resources:   Identify barriers to discharge with patient and caregiver   Arrange for needed discharge resources and transportation as appropriate   Identify discharge learning needs (meds, wound care, etc)   Arrange for interpreters to assist at discharge as needed  Taken 10/17/2023 1712 by Moraima Gustafson RN  Discharge to home or other facility with appropriate resources: Refer to discharge planning if patient needs post-hospital services based on physician order or complex needs related to functional status, cognitive ability or social support system     Problem: Safety - Adult  Goal: Free from fall injury  Outcome: Progressing     Problem: ABCDS Injury Assessment  Goal: Absence of physical injury  Outcome: Progressing     Problem: Pain  Goal: Verbalizes/displays adequate comfort level or baseline comfort level  Outcome: Progressing

## 2023-10-18 NOTE — NURSE NAVIGATOR
Utilize [facility] alcohol withdrawal scale (Based on [place] Modified Alcohol Withdrawal Scale). Tabulate score based on classifications including Tremor, Sweating, Hallucination, Orientation, and Agitation. Tremor: 0  Sweatin  Hallucinations: 0  Orientation: 0  Agitation: 1  Total Score: 1  Action perform as described below     Tremor:  No tremor is 0 points. Tremor on movement is 1 point. Tremor at rest is 2 points. Sweating: No Sweat 0 points. Moist is 1 point. Drenching sweats is 2 points. Hallucinations: No present 0 points. Dissuadable is 1 point. Not dissuadable is 2 points. Orientation: Oriented 0 points. Vague/detached 1 point. Disoriented/no contact 2 points. Agitation: Calm 0 points. Anxious 1 point. Panicky 2 points. Check scale every 2 hours. Discontinue scoring with 4 consecutive scorings of 0. Scale 0: No phenobarbital given. Re-assess every 60 minutes as needed. Scale 1-3: Phenobarbital 130 mg IV over 3 minutes. Re-assess every 60 minutes as needed. May administer every 60 minutes to a maximum dose of phenobarbital 1040 mg in 24 hours! Scale 4-8: Phenobarbital 260 mg IV over 5 minutes. Re-assess every 60 minutes as needed. May administer every 60 minutes to a maximum dose of phenobarbital 1040mg in 24 hours! Scale 9-10: Transfer to ICU (if not already in ICU). Administer 10mg/kg phenobarbital IV over 60 minutes. Maximum dose phenobarbital is 1040mg in 24 hours!

## 2023-10-19 VITALS
SYSTOLIC BLOOD PRESSURE: 131 MMHG | TEMPERATURE: 97.3 F | HEART RATE: 76 BPM | WEIGHT: 143.74 LBS | RESPIRATION RATE: 18 BRPM | OXYGEN SATURATION: 100 % | BODY MASS INDEX: 21.78 KG/M2 | DIASTOLIC BLOOD PRESSURE: 92 MMHG | HEIGHT: 68 IN

## 2023-10-19 LAB
ANION GAP SERPL CALC-SCNC: 10 MEQ/L (ref 8–16)
BUN SERPL-MCNC: 8 MG/DL (ref 7–22)
CALCIUM SERPL-MCNC: 8.5 MG/DL (ref 8.5–10.5)
CHLORIDE SERPL-SCNC: 98 MEQ/L (ref 98–111)
CO2 SERPL-SCNC: 24 MEQ/L (ref 23–33)
CREAT SERPL-MCNC: 0.8 MG/DL (ref 0.4–1.2)
DEPRECATED RDW RBC AUTO: 49.3 FL (ref 35–45)
ERYTHROCYTE [DISTWIDTH] IN BLOOD BY AUTOMATED COUNT: 12.8 % (ref 11.5–14.5)
GFR SERPL CREATININE-BSD FRML MDRD: > 60 ML/MIN/1.73M2
GLUCOSE SERPL-MCNC: 87 MG/DL (ref 70–108)
HCT VFR BLD AUTO: 38.1 % (ref 42–52)
HGB BLD-MCNC: 12.8 GM/DL (ref 14–18)
MCH RBC QN AUTO: 34.7 PG (ref 26–33)
MCHC RBC AUTO-ENTMCNC: 33.6 GM/DL (ref 32.2–35.5)
MCV RBC AUTO: 103.3 FL (ref 80–94)
PLATELET # BLD AUTO: 204 THOU/MM3 (ref 130–400)
PMV BLD AUTO: 10.9 FL (ref 9.4–12.4)
POTASSIUM SERPL-SCNC: 4.3 MEQ/L (ref 3.5–5.2)
RBC # BLD AUTO: 3.69 MILL/MM3 (ref 4.7–6.1)
SODIUM SERPL-SCNC: 132 MEQ/L (ref 135–145)
WBC # BLD AUTO: 3.3 THOU/MM3 (ref 4.8–10.8)

## 2023-10-19 PROCEDURE — 36415 COLL VENOUS BLD VENIPUNCTURE: CPT

## 2023-10-19 PROCEDURE — 6360000002 HC RX W HCPCS

## 2023-10-19 PROCEDURE — 99233 SBSQ HOSP IP/OBS HIGH 50: CPT | Performed by: NURSE PRACTITIONER

## 2023-10-19 PROCEDURE — 85027 COMPLETE CBC AUTOMATED: CPT

## 2023-10-19 PROCEDURE — 80048 BASIC METABOLIC PNL TOTAL CA: CPT

## 2023-10-19 PROCEDURE — 6370000000 HC RX 637 (ALT 250 FOR IP): Performed by: NURSE PRACTITIONER

## 2023-10-19 PROCEDURE — 2580000003 HC RX 258: Performed by: STUDENT IN AN ORGANIZED HEALTH CARE EDUCATION/TRAINING PROGRAM

## 2023-10-19 PROCEDURE — 6370000000 HC RX 637 (ALT 250 FOR IP)

## 2023-10-19 PROCEDURE — 2580000003 HC RX 258

## 2023-10-19 RX ADMIN — FOLIC ACID 1 MG: 1 TABLET ORAL at 10:08

## 2023-10-19 RX ADMIN — Medication 1 TABLET: at 10:08

## 2023-10-19 RX ADMIN — PHENOBARBITAL 64.8 MG: 32.4 TABLET ORAL at 10:08

## 2023-10-19 RX ADMIN — LOSARTAN POTASSIUM 25 MG: 25 TABLET, FILM COATED ORAL at 10:08

## 2023-10-19 RX ADMIN — ENOXAPARIN SODIUM 40 MG: 100 INJECTION SUBCUTANEOUS at 10:08

## 2023-10-19 RX ADMIN — SODIUM CHLORIDE, PRESERVATIVE FREE 10 ML: 5 INJECTION INTRAVENOUS at 10:08

## 2023-10-19 RX ADMIN — Medication 100 MG: at 10:08

## 2023-10-19 RX ADMIN — ACETAMINOPHEN 650 MG: 325 TABLET ORAL at 11:54

## 2023-10-19 RX ADMIN — SODIUM CHLORIDE: 9 INJECTION, SOLUTION INTRAVENOUS at 04:32

## 2023-10-19 RX ADMIN — PANTOPRAZOLE SODIUM 40 MG: 40 TABLET, DELAYED RELEASE ORAL at 06:37

## 2023-10-19 ASSESSMENT — PAIN DESCRIPTION - DESCRIPTORS: DESCRIPTORS: ACHING

## 2023-10-19 ASSESSMENT — PAIN DESCRIPTION - ORIENTATION: ORIENTATION: MID

## 2023-10-19 ASSESSMENT — PAIN SCALES - GENERAL
PAINLEVEL_OUTOF10: 0
PAINLEVEL_OUTOF10: 8

## 2023-10-19 ASSESSMENT — PAIN DESCRIPTION - LOCATION: LOCATION: HEAD

## 2023-10-19 NOTE — PLAN OF CARE
Problem: Discharge Planning  Goal: Discharge to home or other facility with appropriate resources  Recent Flowsheet Documentation  Taken 10/18/2023 2000 by Elba Diop RN  Discharge to home or other facility with appropriate resources:   Identify barriers to discharge with patient and caregiver   Arrange for needed discharge resources and transportation as appropriate   Identify discharge learning needs (meds, wound care, etc)   Arrange for interpreters to assist at discharge as needed   Refer to discharge planning if patient needs post-hospital services based on physician order or complex needs related to functional status, cognitive ability or social support system     Problem: Pain  Goal: Verbalizes/displays adequate comfort level or baseline comfort level  Recent Flowsheet Documentation  Taken 10/18/2023 2000 by Elba Diop RN  Verbalizes/displays adequate comfort level or baseline comfort level:   Encourage patient to monitor pain and request assistance   Assess pain using appropriate pain scale   Administer analgesics based on type and severity of pain and evaluate response   Implement non-pharmacological measures as appropriate and evaluate response   Consider cultural and social influences on pain and pain management

## 2023-10-20 NOTE — CARE COORDINATION
10/18/23 1100   Readmission Assessment   Number of Days since last admission? 1-7 days   Previous Disposition Home with Family   Who is being Interviewed Patient;Caregiver  (& brother-in-law Alicia Garvin)   What was the patient's/caregiver's perception as to why they think they needed to return back to the hospital? Other (Comment)  (alcohol withdrawal)   Did you visit your Primary Care Physician after you left the hospital, before you returned this time? No   Why weren't you able to visit your PCP? Other (Comment)  (appointment was scheduled for today)   Did you see a specialist, such as Cardiac, Pulmonary, Orthopedic Physician, etc. after you left the hospital? No   Who advised the patient to return to the hospital? Self-referral   Does the patient report anything that got in the way of taking their medications? No   In our efforts to provide the best possible care to you and others like you, can you think of anything that we could have done to help you after you left the hospital the first time, so that you might not have needed to return so soon? Other (Comment)  (states he was just told he was \"being discharged\" and had to leave)     Encouraged patient to be active in discharge planning early in hospital stay.  Him and his brother in law asking about resources for discharge
Late entry  Patient left AMA last night    10/20/23, 8:17 AM EDT    Patient goals/plan/ treatment preferences discussed by  and . Patient goals/plan/ treatment preferences reviewed with patient/ family. Patient/ family verbalize understanding of discharge plan and are in agreement with goal/plan/treatment preferences. Understanding was demonstrated using the teach back method. AVS provided by RN at time of discharge, which includes all necessary medical information pertaining to the patients current course of illness, treatment, post-discharge goals of care, and treatment preferences.      Services At/After Discharge: None       IMM Letter  IMM Letter given to Patient/Family/Significant other/Guardian/POA/by[de-identified] patient registration  IMM Letter date given[de-identified] 10/17/23  IMM Letter time given[de-identified] 1534
addiction. Family states that they found patient wondering the streets, wet, voicing he was on his way to get money to buy alcohol. CM spoke with Lesley Zafar and request she meet with them for possible further guidance. Transportation/Food Security/Housekeeping Addressed: No issues identified.      Jack De La Torre RN  Case Management Department

## 2023-10-30 ENCOUNTER — TELEPHONE (OUTPATIENT)
Dept: CARDIOLOGY CLINIC | Age: 68
End: 2023-10-30

## 2023-10-30 NOTE — TELEPHONE ENCOUNTER
Received call from family member Eder Low  He does not want contacted regarding Monty Situ and requested his # be taken off the list   # removed as patient mobile #   Called to home # that is [facility] with [facility] they do not have patient listed in their system.

## 2023-10-31 ENCOUNTER — HOSPITAL ENCOUNTER (EMERGENCY)
Age: 68
Discharge: HOME | End: 2023-10-31
Attending: EMERGENCY MEDICINE
Payer: MEDICARE

## 2023-10-31 ENCOUNTER — APPOINTMENT (OUTPATIENT)
Dept: GENERAL RADIOLOGY | Age: 68
End: 2023-10-31
Payer: MEDICARE

## 2023-10-31 ENCOUNTER — APPOINTMENT (OUTPATIENT)
Dept: CT IMAGING | Age: 68
End: 2023-10-31
Payer: MEDICARE

## 2023-10-31 VITALS
SYSTOLIC BLOOD PRESSURE: 152 MMHG | TEMPERATURE: 97.8 F | OXYGEN SATURATION: 98 % | HEART RATE: 67 BPM | DIASTOLIC BLOOD PRESSURE: 88 MMHG | RESPIRATION RATE: 14 BRPM

## 2023-10-31 DIAGNOSIS — K21.9 GASTROESOPHAGEAL REFLUX DISEASE WITHOUT ESOPHAGITIS: ICD-10-CM

## 2023-10-31 DIAGNOSIS — R07.9 CHEST PAIN, UNSPECIFIED TYPE: Primary | ICD-10-CM

## 2023-10-31 LAB
ALBUMIN SERPL BCG-MCNC: 4.1 G/DL (ref 3.5–5.1)
ALP SERPL-CCNC: 71 U/L (ref 38–126)
ALT SERPL W/O P-5'-P-CCNC: 25 U/L (ref 11–66)
ANION GAP SERPL CALC-SCNC: 11 MEQ/L (ref 8–16)
ANISOCYTOSIS BLD QL SMEAR: PRESENT
AST SERPL-CCNC: 37 U/L (ref 5–40)
BASOPHILS ABSOLUTE: 0 THOU/MM3 (ref 0–0.1)
BASOPHILS NFR BLD AUTO: 0.9 %
BILIRUB CONJ SERPL-MCNC: < 0.2 MG/DL (ref 0–0.3)
BILIRUB SERPL-MCNC: 0.3 MG/DL (ref 0.3–1.2)
BUN SERPL-MCNC: 19 MG/DL (ref 7–22)
CALCIUM SERPL-MCNC: 9.1 MG/DL (ref 8.5–10.5)
CHLORIDE SERPL-SCNC: 95 MEQ/L (ref 98–111)
CO2 SERPL-SCNC: 26 MEQ/L (ref 23–33)
CREAT SERPL-MCNC: 1.2 MG/DL (ref 0.4–1.2)
DEPRECATED RDW RBC AUTO: 48.6 FL (ref 35–45)
EOSINOPHIL NFR BLD AUTO: 2.1 %
EOSINOPHILS ABSOLUTE: 0.1 THOU/MM3 (ref 0–0.4)
ERYTHROCYTE [DISTWIDTH] IN BLOOD BY AUTOMATED COUNT: 12.7 % (ref 11.5–14.5)
FLUAV RNA RESP QL NAA+PROBE: NOT DETECTED
FLUBV RNA RESP QL NAA+PROBE: NOT DETECTED
GFR SERPL CREATININE-BSD FRML MDRD: > 60 ML/MIN/1.73M2
GLUCOSE SERPL-MCNC: 95 MG/DL (ref 70–108)
HCT VFR BLD AUTO: 40.2 % (ref 42–52)
HGB BLD-MCNC: 13.3 GM/DL (ref 14–18)
IMM GRANULOCYTES # BLD AUTO: 0.01 THOU/MM3 (ref 0–0.07)
IMM GRANULOCYTES NFR BLD AUTO: 0.3 %
LIPASE SERPL-CCNC: 37 U/L (ref 5.6–51.3)
LYMPHOCYTES ABSOLUTE: 1.9 THOU/MM3 (ref 1–4.8)
LYMPHOCYTES NFR BLD AUTO: 54.6 %
MAGNESIUM SERPL-MCNC: 2 MG/DL (ref 1.6–2.4)
MCH RBC QN AUTO: 34.2 PG (ref 26–33)
MCHC RBC AUTO-ENTMCNC: 33.1 GM/DL (ref 32.2–35.5)
MCV RBC AUTO: 103.3 FL (ref 80–94)
MONOCYTES ABSOLUTE: 0.5 THOU/MM3 (ref 0.4–1.3)
MONOCYTES NFR BLD AUTO: 15.3 %
NEUTROPHILS NFR BLD AUTO: 26.8 %
NRBC BLD AUTO-RTO: 0 /100 WBC
NT-PROBNP SERPL IA-MCNC: 343.5 PG/ML (ref 0–124)
OSMOLALITY SERPL CALC.SUM OF ELEC: 266.6 MOSMOL/KG (ref 275–300)
PLATELET # BLD AUTO: 227 THOU/MM3 (ref 130–400)
PLATELET BLD QL SMEAR: ADEQUATE
PMV BLD AUTO: 10.4 FL (ref 9.4–12.4)
POTASSIUM SERPL-SCNC: 4.3 MEQ/L (ref 3.5–5.2)
PROT SERPL-MCNC: 7.8 G/DL (ref 6.1–8)
RBC # BLD AUTO: 3.89 MILL/MM3 (ref 4.7–6.1)
SARS-COV-2 RNA RESP QL NAA+PROBE: NOT DETECTED
SCAN OF BLOOD SMEAR: NORMAL
SEGMENTED NEUTROPHILS ABSOLUTE COUNT: 0.9 THOU/MM3 (ref 1.8–7.7)
SODIUM SERPL-SCNC: 132 MEQ/L (ref 135–145)
TROPONIN, HIGH SENSITIVITY: 10 NG/L (ref 0–12)
TROPONIN, HIGH SENSITIVITY: 12 NG/L (ref 0–12)
WBC # BLD AUTO: 3.4 THOU/MM3 (ref 4.8–10.8)

## 2023-10-31 PROCEDURE — 87636 SARSCOV2 & INF A&B AMP PRB: CPT

## 2023-10-31 PROCEDURE — 99285 EMERGENCY DEPT VISIT HI MDM: CPT

## 2023-10-31 PROCEDURE — 2580000003 HC RX 258: Performed by: EMERGENCY MEDICINE

## 2023-10-31 PROCEDURE — C9113 INJ PANTOPRAZOLE SODIUM, VIA: HCPCS | Performed by: EMERGENCY MEDICINE

## 2023-10-31 PROCEDURE — 71045 X-RAY EXAM CHEST 1 VIEW: CPT

## 2023-10-31 PROCEDURE — 83690 ASSAY OF LIPASE: CPT

## 2023-10-31 PROCEDURE — 80053 COMPREHEN METABOLIC PANEL: CPT

## 2023-10-31 PROCEDURE — 71275 CT ANGIOGRAPHY CHEST: CPT

## 2023-10-31 PROCEDURE — A4216 STERILE WATER/SALINE, 10 ML: HCPCS | Performed by: EMERGENCY MEDICINE

## 2023-10-31 PROCEDURE — 85025 COMPLETE CBC W/AUTO DIFF WBC: CPT

## 2023-10-31 PROCEDURE — 6360000004 HC RX CONTRAST MEDICATION: Performed by: EMERGENCY MEDICINE

## 2023-10-31 PROCEDURE — 6370000000 HC RX 637 (ALT 250 FOR IP): Performed by: EMERGENCY MEDICINE

## 2023-10-31 PROCEDURE — 93005 ELECTROCARDIOGRAM TRACING: CPT | Performed by: EMERGENCY MEDICINE

## 2023-10-31 PROCEDURE — 96375 TX/PRO/DX INJ NEW DRUG ADDON: CPT

## 2023-10-31 PROCEDURE — 83880 ASSAY OF NATRIURETIC PEPTIDE: CPT

## 2023-10-31 PROCEDURE — 36415 COLL VENOUS BLD VENIPUNCTURE: CPT

## 2023-10-31 PROCEDURE — 96374 THER/PROPH/DIAG INJ IV PUSH: CPT

## 2023-10-31 PROCEDURE — 82248 BILIRUBIN DIRECT: CPT

## 2023-10-31 PROCEDURE — 84484 ASSAY OF TROPONIN QUANT: CPT

## 2023-10-31 PROCEDURE — 83735 ASSAY OF MAGNESIUM: CPT

## 2023-10-31 PROCEDURE — 6360000002 HC RX W HCPCS: Performed by: EMERGENCY MEDICINE

## 2023-10-31 RX ORDER — HYDROCODONE BITARTRATE AND ACETAMINOPHEN 5; 325 MG/1; MG/1
1 TABLET ORAL ONCE
Status: COMPLETED | OUTPATIENT
Start: 2023-10-31 | End: 2023-10-31

## 2023-10-31 RX ORDER — PANTOPRAZOLE SODIUM 40 MG/1
40 TABLET, DELAYED RELEASE ORAL
Qty: 30 TABLET | Refills: 0 | Status: SHIPPED | OUTPATIENT
Start: 2023-10-31

## 2023-10-31 RX ORDER — TRAMADOL HYDROCHLORIDE 50 MG/1
50 TABLET ORAL EVERY 8 HOURS PRN
Qty: 9 TABLET | Refills: 0 | Status: SHIPPED | OUTPATIENT
Start: 2023-10-31 | End: 2023-11-03

## 2023-10-31 RX ORDER — ONDANSETRON 4 MG/1
4 TABLET, ORALLY DISINTEGRATING ORAL 3 TIMES DAILY PRN
Qty: 21 TABLET | Refills: 0 | Status: SHIPPED | OUTPATIENT
Start: 2023-10-31

## 2023-10-31 RX ORDER — ONDANSETRON 2 MG/ML
4 INJECTION INTRAMUSCULAR; INTRAVENOUS ONCE
Status: COMPLETED | OUTPATIENT
Start: 2023-10-31 | End: 2023-10-31

## 2023-10-31 RX ADMIN — ONDANSETRON 4 MG: 2 INJECTION INTRAMUSCULAR; INTRAVENOUS at 18:25

## 2023-10-31 RX ADMIN — ALUMINUM HYDROXIDE, MAGNESIUM HYDROXIDE, AND SIMETHICONE: 200; 200; 20 SUSPENSION ORAL at 18:24

## 2023-10-31 RX ADMIN — PANTOPRAZOLE SODIUM 40 MG: 40 INJECTION, POWDER, FOR SOLUTION INTRAVENOUS at 18:25

## 2023-10-31 RX ADMIN — IOPAMIDOL 80 ML: 755 INJECTION, SOLUTION INTRAVENOUS at 19:16

## 2023-10-31 RX ADMIN — HYDROCODONE BITARTRATE AND ACETAMINOPHEN 1 TABLET: 5; 325 TABLET ORAL at 18:24

## 2023-10-31 ASSESSMENT — ENCOUNTER SYMPTOMS
CHOKING: 0
BACK PAIN: 0
EYE PAIN: 0
CONSTIPATION: 0
ABDOMINAL PAIN: 0
EYE DISCHARGE: 0
TROUBLE SWALLOWING: 0
BLOOD IN STOOL: 0
SORE THROAT: 0
CHEST TIGHTNESS: 0
ABDOMINAL DISTENTION: 0
NAUSEA: 0
EYE ITCHING: 0
PHOTOPHOBIA: 0
VOICE CHANGE: 0
SHORTNESS OF BREATH: 0
DIARRHEA: 0
COUGH: 0
VOMITING: 0
EYE REDNESS: 0
RHINORRHEA: 0
SINUS PRESSURE: 0
WHEEZING: 0

## 2023-10-31 ASSESSMENT — PAIN DESCRIPTION - LOCATION: LOCATION: CHEST

## 2023-10-31 ASSESSMENT — PAIN SCALES - GENERAL
PAINLEVEL_OUTOF10: 10

## 2023-10-31 ASSESSMENT — PAIN - FUNCTIONAL ASSESSMENT
PAIN_FUNCTIONAL_ASSESSMENT: 0-10

## 2023-10-31 ASSESSMENT — PAIN DESCRIPTION - DESCRIPTORS: DESCRIPTORS: PRESSURE

## 2023-10-31 NOTE — ED NOTES
Pt resting on cot. VS stable. Telemetry in reach. Call light in reach. Family at bedside.      Alivia Gibbs RN  10/31/23 1929

## 2023-10-31 NOTE — ED NOTES
Upon first contact with patient this RN receives bedside shift report from [employer].      Cristy Addison RN  10/31/23 1910

## 2023-10-31 NOTE — ED NOTES
Pt medicated per MAR. Patient resting in bed. Respirations easy and unlabored. No distress noted. Call light within reach.        Vahid Wolff RN  10/31/23 7910

## 2023-10-31 NOTE — ED NOTES
Pt to ED via intake with c/o chest pain. Pt reports they were recently admitted with COVID and since they left, pt reports their chest has hurt. Pt reports the pain feels like pressure. Pt is unsure who their cardiologist is. Pt VSS. EKG completed. IV inserted.       Cyndee Hoffman RN  10/31/23 1800

## 2023-11-01 ENCOUNTER — TELEPHONE (OUTPATIENT)
Dept: CARDIOLOGY CLINIC | Age: 68
End: 2023-11-01

## 2023-11-01 LAB
EKG ATRIAL RATE: 84 BPM
EKG P AXIS: 71 DEGREES
EKG P-R INTERVAL: 142 MS
EKG Q-T INTERVAL: 370 MS
EKG QRS DURATION: 84 MS
EKG QTC CALCULATION (BAZETT): 437 MS
EKG R AXIS: 62 DEGREES
EKG T AXIS: 52 DEGREES
EKG VENTRICULAR RATE: 84 BPM

## 2023-11-01 PROCEDURE — 93010 ELECTROCARDIOGRAM REPORT: CPT | Performed by: NUCLEAR MEDICINE

## 2023-11-01 ASSESSMENT — HEART SCORE: ECG: 0

## 2023-11-01 NOTE — DISCHARGE INSTRUCTIONS
Patient has what appears to be having chest pain. This is most likely related to GERD however the patient has been scheduled to follow-up with [facility] cardiology on 11/1/2023 at 9 AM he is instructed to keep this appointment. He is instructed to take all medications as prescribed. Patient is also instructed to follow-up with primary care physician and call for an appointment within the next 1 to 2 days. Patient will be given medications for his reflux disease he is instructed to take those as prescribed. He is instructed return to the nearest emergency room immediately for any new or worsening complaints.

## 2023-12-08 ENCOUNTER — HOSPITAL ENCOUNTER (INPATIENT)
Age: 68
LOS: 2 days | Discharge: HOME | DRG: 312 | End: 2023-12-10
Attending: EMERGENCY MEDICINE | Admitting: INTERNAL MEDICINE
Payer: MEDICARE

## 2023-12-08 ENCOUNTER — APPOINTMENT (OUTPATIENT)
Dept: GENERAL RADIOLOGY | Age: 68
DRG: 312 | End: 2023-12-08
Payer: MEDICARE

## 2023-12-08 DIAGNOSIS — R07.9 CHEST PAIN, UNSPECIFIED TYPE: ICD-10-CM

## 2023-12-08 DIAGNOSIS — R55 SYNCOPE AND COLLAPSE: Primary | ICD-10-CM

## 2023-12-08 LAB
ANION GAP SERPL CALC-SCNC: 19 MEQ/L (ref 8–16)
B-OH-BUTYR SERPL-MSCNC: 3.92 MG/DL (ref 0.2–2.81)
BASOPHILS ABSOLUTE: 0 THOU/MM3 (ref 0–0.1)
BASOPHILS NFR BLD AUTO: 0.7 %
BUN SERPL-MCNC: 16 MG/DL (ref 7–22)
CALCIUM SERPL-MCNC: 8.7 MG/DL (ref 8.5–10.5)
CHLORIDE SERPL-SCNC: 93 MEQ/L (ref 98–111)
CK SERPL-CCNC: 234 U/L (ref 55–170)
CO2 SERPL-SCNC: 20 MEQ/L (ref 23–33)
CREAT SERPL-MCNC: 1.6 MG/DL (ref 0.4–1.2)
D DIMER PPP IA.FEU-MCNC: 266 NG/ML FEU (ref 0–500)
DEPRECATED RDW RBC AUTO: 46.5 FL (ref 35–45)
EOSINOPHIL NFR BLD AUTO: 0.7 %
EOSINOPHILS ABSOLUTE: 0 THOU/MM3 (ref 0–0.4)
ERYTHROCYTE [DISTWIDTH] IN BLOOD BY AUTOMATED COUNT: 12.5 % (ref 11.5–14.5)
FLUAV RNA RESP QL NAA+PROBE: NOT DETECTED
FLUBV RNA RESP QL NAA+PROBE: NOT DETECTED
GFR SERPL CREATININE-BSD FRML MDRD: 46 ML/MIN/1.73M2
GLUCOSE SERPL-MCNC: 90 MG/DL (ref 70–108)
HCT VFR BLD AUTO: 37.1 % (ref 42–52)
HGB BLD-MCNC: 12.6 GM/DL (ref 14–18)
IMM GRANULOCYTES # BLD AUTO: 0.03 THOU/MM3 (ref 0–0.07)
IMM GRANULOCYTES NFR BLD AUTO: 1.1 %
LACTATE SERPL-SCNC: 1.1 MMOL/L (ref 0.5–2)
LACTIC ACID, SEPSIS: 2.6 MMOL/L (ref 0.5–1.9)
LYMPHOCYTES ABSOLUTE: 1.2 THOU/MM3 (ref 1–4.8)
LYMPHOCYTES NFR BLD AUTO: 44.2 %
MAGNESIUM SERPL-MCNC: 1.9 MG/DL (ref 1.6–2.4)
MCH RBC QN AUTO: 33.9 PG (ref 26–33)
MCHC RBC AUTO-ENTMCNC: 34 GM/DL (ref 32.2–35.5)
MCV RBC AUTO: 99.7 FL (ref 80–94)
MONOCYTES ABSOLUTE: 0.3 THOU/MM3 (ref 0.4–1.3)
MONOCYTES NFR BLD AUTO: 11.2 %
NEUTROPHILS NFR BLD AUTO: 42.1 %
NRBC BLD AUTO-RTO: 0 /100 WBC
OSMOLALITY SERPL CALC.SUM OF ELEC: 265.2 MOSMOL/KG (ref 275–300)
OSMOLALITY SERPL: 286 MOSMOL/KG (ref 275–295)
PLATELET # BLD AUTO: 193 THOU/MM3 (ref 130–400)
PMV BLD AUTO: 9.6 FL (ref 9.4–12.4)
POTASSIUM SERPL-SCNC: 4.2 MEQ/L (ref 3.5–5.2)
RBC # BLD AUTO: 3.72 MILL/MM3 (ref 4.7–6.1)
SARS-COV-2 RNA RESP QL NAA+PROBE: NOT DETECTED
SEGMENTED NEUTROPHILS ABSOLUTE COUNT: 1.2 THOU/MM3 (ref 1.8–7.7)
SODIUM SERPL-SCNC: 132 MEQ/L (ref 135–145)
T4 FREE SERPL-MCNC: 1.18 NG/DL (ref 0.93–1.76)
TROPONIN, HIGH SENSITIVITY: 12 NG/L (ref 0–12)
TROPONIN, HIGH SENSITIVITY: 16 NG/L (ref 0–12)
TSH SERPL DL<=0.005 MIU/L-ACNC: 2.96 UIU/ML (ref 0.4–4.2)
WBC # BLD AUTO: 2.8 THOU/MM3 (ref 4.8–10.8)

## 2023-12-08 PROCEDURE — 71045 X-RAY EXAM CHEST 1 VIEW: CPT

## 2023-12-08 PROCEDURE — 99223 1ST HOSP IP/OBS HIGH 75: CPT | Performed by: INTERNAL MEDICINE

## 2023-12-08 PROCEDURE — 80048 BASIC METABOLIC PNL TOTAL CA: CPT

## 2023-12-08 PROCEDURE — 82550 ASSAY OF CK (CPK): CPT

## 2023-12-08 PROCEDURE — 99285 EMERGENCY DEPT VISIT HI MDM: CPT

## 2023-12-08 PROCEDURE — 36415 COLL VENOUS BLD VENIPUNCTURE: CPT

## 2023-12-08 PROCEDURE — 93005 ELECTROCARDIOGRAM TRACING: CPT | Performed by: EMERGENCY MEDICINE

## 2023-12-08 PROCEDURE — 84439 ASSAY OF FREE THYROXINE: CPT

## 2023-12-08 PROCEDURE — C9113 INJ PANTOPRAZOLE SODIUM, VIA: HCPCS

## 2023-12-08 PROCEDURE — 85025 COMPLETE CBC W/AUTO DIFF WBC: CPT

## 2023-12-08 PROCEDURE — 2580000003 HC RX 258

## 2023-12-08 PROCEDURE — 2580000003 HC RX 258: Performed by: EMERGENCY MEDICINE

## 2023-12-08 PROCEDURE — 83735 ASSAY OF MAGNESIUM: CPT

## 2023-12-08 PROCEDURE — 6370000000 HC RX 637 (ALT 250 FOR IP): Performed by: EMERGENCY MEDICINE

## 2023-12-08 PROCEDURE — 83605 ASSAY OF LACTIC ACID: CPT

## 2023-12-08 PROCEDURE — 1200000003 HC TELEMETRY R&B

## 2023-12-08 PROCEDURE — 84484 ASSAY OF TROPONIN QUANT: CPT

## 2023-12-08 PROCEDURE — 85379 FIBRIN DEGRADATION QUANT: CPT

## 2023-12-08 PROCEDURE — 82010 KETONE BODYS QUAN: CPT

## 2023-12-08 PROCEDURE — 6360000002 HC RX W HCPCS

## 2023-12-08 PROCEDURE — 87636 SARSCOV2 & INF A&B AMP PRB: CPT

## 2023-12-08 PROCEDURE — 84443 ASSAY THYROID STIM HORMONE: CPT

## 2023-12-08 PROCEDURE — 83930 ASSAY OF BLOOD OSMOLALITY: CPT

## 2023-12-08 PROCEDURE — 93005 ELECTROCARDIOGRAM TRACING: CPT

## 2023-12-08 RX ORDER — SODIUM CHLORIDE, SODIUM LACTATE, POTASSIUM CHLORIDE, AND CALCIUM CHLORIDE .6; .31; .03; .02 G/100ML; G/100ML; G/100ML; G/100ML
1000 INJECTION, SOLUTION INTRAVENOUS ONCE
Status: COMPLETED | OUTPATIENT
Start: 2023-12-08 | End: 2023-12-08

## 2023-12-08 RX ORDER — LOSARTAN POTASSIUM 25 MG/1
25 TABLET ORAL DAILY
Status: DISCONTINUED | OUTPATIENT
Start: 2023-12-08 | End: 2023-12-09

## 2023-12-08 RX ORDER — POTASSIUM CHLORIDE 20 MEQ/1
40 TABLET, EXTENDED RELEASE ORAL PRN
Status: DISCONTINUED | OUTPATIENT
Start: 2023-12-08 | End: 2023-12-10 | Stop reason: HOSPADM

## 2023-12-08 RX ORDER — HEPARIN SODIUM 5000 [USP'U]/ML
5000 INJECTION, SOLUTION INTRAVENOUS; SUBCUTANEOUS EVERY 8 HOURS SCHEDULED
Status: DISCONTINUED | OUTPATIENT
Start: 2023-12-08 | End: 2023-12-10 | Stop reason: HOSPADM

## 2023-12-08 RX ORDER — POTASSIUM CHLORIDE 7.45 MG/ML
10 INJECTION INTRAVENOUS PRN
Status: DISCONTINUED | OUTPATIENT
Start: 2023-12-08 | End: 2023-12-10 | Stop reason: HOSPADM

## 2023-12-08 RX ORDER — POLYETHYLENE GLYCOL 3350 17 G/17G
17 POWDER, FOR SOLUTION ORAL DAILY PRN
Status: DISCONTINUED | OUTPATIENT
Start: 2023-12-08 | End: 2023-12-10 | Stop reason: HOSPADM

## 2023-12-08 RX ORDER — ACETAMINOPHEN 325 MG/1
650 TABLET ORAL EVERY 6 HOURS PRN
Status: DISCONTINUED | OUTPATIENT
Start: 2023-12-08 | End: 2023-12-10 | Stop reason: HOSPADM

## 2023-12-08 RX ORDER — PANTOPRAZOLE SODIUM 40 MG/10ML
40 INJECTION, POWDER, LYOPHILIZED, FOR SOLUTION INTRAVENOUS DAILY
Status: DISCONTINUED | OUTPATIENT
Start: 2023-12-08 | End: 2023-12-10 | Stop reason: HOSPADM

## 2023-12-08 RX ORDER — ENOXAPARIN SODIUM 100 MG/ML
40 INJECTION SUBCUTANEOUS EVERY 24 HOURS
Status: DISCONTINUED | OUTPATIENT
Start: 2023-12-08 | End: 2023-12-08

## 2023-12-08 RX ORDER — SODIUM CHLORIDE 0.9 % (FLUSH) 0.9 %
5-40 SYRINGE (ML) INJECTION EVERY 12 HOURS SCHEDULED
Status: DISCONTINUED | OUTPATIENT
Start: 2023-12-08 | End: 2023-12-10 | Stop reason: HOSPADM

## 2023-12-08 RX ORDER — 0.9 % SODIUM CHLORIDE 0.9 %
1000 INTRAVENOUS SOLUTION INTRAVENOUS ONCE
Status: COMPLETED | OUTPATIENT
Start: 2023-12-08 | End: 2023-12-08

## 2023-12-08 RX ORDER — MAGNESIUM SULFATE IN WATER 40 MG/ML
2000 INJECTION, SOLUTION INTRAVENOUS PRN
Status: DISCONTINUED | OUTPATIENT
Start: 2023-12-08 | End: 2023-12-10 | Stop reason: HOSPADM

## 2023-12-08 RX ORDER — ONDANSETRON 2 MG/ML
4 INJECTION INTRAMUSCULAR; INTRAVENOUS EVERY 6 HOURS PRN
Status: DISCONTINUED | OUTPATIENT
Start: 2023-12-08 | End: 2023-12-10 | Stop reason: HOSPADM

## 2023-12-08 RX ORDER — SODIUM CHLORIDE 9 MG/ML
INJECTION, SOLUTION INTRAVENOUS PRN
Status: DISCONTINUED | OUTPATIENT
Start: 2023-12-08 | End: 2023-12-10 | Stop reason: HOSPADM

## 2023-12-08 RX ORDER — HYDROCODONE BITARTRATE AND ACETAMINOPHEN 5; 325 MG/1; MG/1
1 TABLET ORAL ONCE
Status: COMPLETED | OUTPATIENT
Start: 2023-12-08 | End: 2023-12-08

## 2023-12-08 RX ORDER — ONDANSETRON 4 MG/1
4 TABLET, ORALLY DISINTEGRATING ORAL EVERY 8 HOURS PRN
Status: DISCONTINUED | OUTPATIENT
Start: 2023-12-08 | End: 2023-12-10 | Stop reason: HOSPADM

## 2023-12-08 RX ORDER — SODIUM CHLORIDE 0.9 % (FLUSH) 0.9 %
5-40 SYRINGE (ML) INJECTION PRN
Status: DISCONTINUED | OUTPATIENT
Start: 2023-12-08 | End: 2023-12-10 | Stop reason: HOSPADM

## 2023-12-08 RX ORDER — SODIUM CHLORIDE 9 MG/ML
INJECTION, SOLUTION INTRAVENOUS CONTINUOUS
Status: DISCONTINUED | OUTPATIENT
Start: 2023-12-08 | End: 2023-12-10 | Stop reason: HOSPADM

## 2023-12-08 RX ORDER — ACETAMINOPHEN 650 MG/1
650 SUPPOSITORY RECTAL EVERY 6 HOURS PRN
Status: DISCONTINUED | OUTPATIENT
Start: 2023-12-08 | End: 2023-12-10 | Stop reason: HOSPADM

## 2023-12-08 RX ADMIN — SODIUM CHLORIDE 1000 ML: 9 INJECTION, SOLUTION INTRAVENOUS at 11:28

## 2023-12-08 RX ADMIN — SODIUM CHLORIDE: 9 INJECTION, SOLUTION INTRAVENOUS at 22:31

## 2023-12-08 RX ADMIN — SODIUM CHLORIDE, POTASSIUM CHLORIDE, SODIUM LACTATE AND CALCIUM CHLORIDE 1000 ML: 600; 310; 30; 20 INJECTION, SOLUTION INTRAVENOUS at 13:29

## 2023-12-08 RX ADMIN — SODIUM CHLORIDE, PRESERVATIVE FREE 10 ML: 5 INJECTION INTRAVENOUS at 22:31

## 2023-12-08 RX ADMIN — HYDROCODONE BITARTRATE AND ACETAMINOPHEN 1 TABLET: 5; 325 TABLET ORAL at 16:29

## 2023-12-08 RX ADMIN — PANTOPRAZOLE SODIUM 40 MG: 40 INJECTION, POWDER, FOR SOLUTION INTRAVENOUS at 19:04

## 2023-12-08 ASSESSMENT — PAIN DESCRIPTION - LOCATION
LOCATION: GENERALIZED

## 2023-12-08 ASSESSMENT — PAIN - FUNCTIONAL ASSESSMENT
PAIN_FUNCTIONAL_ASSESSMENT: 0-10
PAIN_FUNCTIONAL_ASSESSMENT: 0-10

## 2023-12-08 ASSESSMENT — PAIN SCALES - GENERAL
PAINLEVEL_OUTOF10: 5
PAINLEVEL_OUTOF10: 10
PAINLEVEL_OUTOF10: 5
PAINLEVEL_OUTOF10: 6

## 2023-12-08 ASSESSMENT — PAIN DESCRIPTION - PAIN TYPE: TYPE: CHRONIC PAIN;ACUTE PAIN

## 2023-12-08 ASSESSMENT — PAIN DESCRIPTION - FREQUENCY: FREQUENCY: CONTINUOUS

## 2023-12-08 ASSESSMENT — PAIN DESCRIPTION - ONSET: ONSET: ON-GOING

## 2023-12-08 ASSESSMENT — PAIN DESCRIPTION - DESCRIPTORS
DESCRIPTORS: ACHING
DESCRIPTORS: ACHING

## 2023-12-08 NOTE — ED PROVIDER NOTES
[facility] DEPT  EMERGENCY DEPARTMENT ENCOUNTER          Pt Name: Mary Ann Jessica  MRN: [id number]  [address] [facility] 1955  Date of evaluation: 12/8/2023  Physician: Abhilash Mike MD, [place], [place]      CHIEF COMPLAINT       Chief Complaint   Patient presents with    Loss of Consciousness         HISTORY OF PRESENT ILLNESS    HPI  Mary Ann Jessica is a 69 y.o. male who presents to the emergency department from home, brought in by EMS for evaluation of syncope. Patient states he has been intermittently lightheaded for 2 or 3 weeks, much worse in the last several days. This morning states that he was sitting at the table and had a witnessed syncopal event after which he recovered but continues feeling lightheaded and slightly short of breath. Denies fever, chills. States he does not currently have a PCP and has not followed up with anybody for this symptoms. Patient also states he has been told in the past about being diabetic but does not take any medications for it. He does complain of polyuria and polydipsia. The patient has no other acute complaints at this time.       PAST MEDICAL AND SURGICAL HISTORY     Past Medical History:   Diagnosis Date    Asthma     Cerebral artery occlusion with cerebral infarction ([address] )     Chronic kidney disease     Diarrhea     Hemodialysis patient ([address] [facility])     previous    Hypertension      Past Surgical History:   Procedure Laterality Date    COLONOSCOPY           MEDICATIONS     Current Facility-Administered Medications:     lactated ringers bolus bolus 1,000 mL, 1,000 mL, IntraVENous, Once, Abhilash Mike MD    HYDROcodone-acetaminophen (NORCO) 5-325 MG per tablet 1 tablet, 1 tablet, Oral, Once, Abhilash Mike MD    Current Outpatient Medications:     ondansetron (ZOFRAN-ODT) 4 MG disintegrating tablet, Take 1 tablet by mouth 3 times daily as needed for Nausea or Vomiting, Disp: 21 tablet, Rfl: 0    pantoprazole (PROTONIX) 40 MG tablet, Take 1

## 2023-12-08 NOTE — ED NOTES
Pt resting on cot. Eyes closed. Respirations easy and unlabored. Call light within reach. Lights dimmed. Rails up x2. Door shut.       Buffy Peterson  12/08/23 0859

## 2023-12-08 NOTE — ED NOTES
Patient up to use restroom without difficulty. Patient eating meal at bedside speaking with admitting physician. Patient denies further needs or complaints.       Inocente Lane RN  12/08/23 9508

## 2023-12-08 NOTE — ED NOTES
Patient to the ED via EMS. Per EMS patient was at the soup kitchen today getting a meal and coffee when he had a syncopal episode. Patent reports to this RN sometimes when he consumes coffee he becomes dizzy and has a syncopal episode. Patient appears tired. Patient reluctant to answer this RN's triage questions stating \"I'm just tired let me sleep. \" Patient also complains of generalized pain. Patient is resting in bed with easy and unlabored respirations. Call light in reach. Side rails up x2. Patient denies further complaints or concerns. Will monitor.         Chris Lima RN  12/08/23 9649

## 2023-12-08 NOTE — ED NOTES
Pt transported to [facility] in stable condition. Floor contacted before transport,spoke to floor staff.       Roberto Ocasio  12/08/23 7942

## 2023-12-09 LAB
AMPHETAMINES UR QL SCN: NEGATIVE
ANION GAP SERPL CALC-SCNC: 11 MEQ/L (ref 8–16)
BARBITURATES UR QL SCN: NEGATIVE
BENZODIAZ UR QL SCN: NEGATIVE
BILIRUB UR QL STRIP: NEGATIVE
BUN SERPL-MCNC: 18 MG/DL (ref 7–22)
BZE UR QL SCN: NEGATIVE
C CAYETANENSIS DNA SPEC QL NAA+PROBE: NOT DETECTED
CALCIUM SERPL-MCNC: 8.6 MG/DL (ref 8.5–10.5)
CAMPY SP DNA.DIARRHEA STL QL NAA+PROBE: NOT DETECTED
CANNABINOIDS UR QL SCN: NEGATIVE
CHARACTER UR: CLEAR
CHLORIDE SERPL-SCNC: 95 MEQ/L (ref 98–111)
CO2 SERPL-SCNC: 25 MEQ/L (ref 23–33)
COLOR UR: YELLOW
CREAT SERPL-MCNC: 1.3 MG/DL (ref 0.4–1.2)
CREAT UR-MCNC: 73.4 MG/DL
CRYPTOSP DNA SPEC QL NAA+PROBE: NOT DETECTED
DEPRECATED RDW RBC AUTO: 45.1 FL (ref 35–45)
E COLI O157H7 DNA SPEC QL NAA+PROBE: NORMAL
E HISTOLYT DNA SPEC QL NAA+PROBE: NOT DETECTED
EAEC PAA PLAS AGGR+AATA ST NAA+NON-PRB: NOT DETECTED
EC STX1+STX2 + H7 FLIC SPEC NAA+PROBE: NOT DETECTED
EKG ATRIAL RATE: 67 BPM
EKG ATRIAL RATE: 70 BPM
EKG P AXIS: 81 DEGREES
EKG P AXIS: 82 DEGREES
EKG P-R INTERVAL: 152 MS
EKG P-R INTERVAL: 158 MS
EKG Q-T INTERVAL: 402 MS
EKG Q-T INTERVAL: 420 MS
EKG QRS DURATION: 78 MS
EKG QRS DURATION: 84 MS
EKG QTC CALCULATION (BAZETT): 424 MS
EKG QTC CALCULATION (BAZETT): 453 MS
EKG R AXIS: 77 DEGREES
EKG R AXIS: 84 DEGREES
EKG T AXIS: 62 DEGREES
EKG T AXIS: 76 DEGREES
EKG VENTRICULAR RATE: 67 BPM
EKG VENTRICULAR RATE: 70 BPM
EPEC EAE GENE STL QL NAA+NON-PROBE: NOT DETECTED
ERYTHROCYTE [DISTWIDTH] IN BLOOD BY AUTOMATED COUNT: 12.4 % (ref 11.5–14.5)
ETEC LTA+ST1A+ST1B TOX ST NAA+NON-PROBE: NOT DETECTED
FENTANYL: NEGATIVE
G LAMBLIA DNA SPEC QL NAA+PROBE: NOT DETECTED
GFR SERPL CREATININE-BSD FRML MDRD: 59 ML/MIN/1.73M2
GLUCOSE SERPL-MCNC: 99 MG/DL (ref 70–108)
GLUCOSE UR QL STRIP.AUTO: NEGATIVE MG/DL
HADV DNA SPEC QL NAA+PROBE: NOT DETECTED
HASTV RNA SPEC QL NAA+PROBE: NOT DETECTED
HCT VFR BLD AUTO: 38.8 % (ref 42–52)
HGB BLD-MCNC: 13.3 GM/DL (ref 14–18)
HGB UR QL STRIP.AUTO: NEGATIVE
KETONES UR QL STRIP.AUTO: NEGATIVE
LEUKOCYTE ESTERASE UR QL STRIP.AUTO: NEGATIVE
MCH RBC QN AUTO: 34.1 PG (ref 26–33)
MCHC RBC AUTO-ENTMCNC: 34.3 GM/DL (ref 32.2–35.5)
MCV RBC AUTO: 99.5 FL (ref 80–94)
NITRITE UR QL STRIP.AUTO: NEGATIVE
NOROVIRUS GI + GII RNA STL NAA+PROBE: NOT DETECTED
OPIATES UR QL SCN: NEGATIVE
OSMOLALITY UR: 203 MOSMOL/KG (ref 250–750)
OXYCODONE: NEGATIVE
P SHIGELLOIDES DNA STL QL NAA+PROBE: NOT DETECTED
PCP UR QL SCN: NEGATIVE
PH UR STRIP.AUTO: 6 [PH] (ref 5–9)
PLATELET # BLD AUTO: 184 THOU/MM3 (ref 130–400)
PMV BLD AUTO: 9.9 FL (ref 9.4–12.4)
POTASSIUM SERPL-SCNC: 4.7 MEQ/L (ref 3.5–5.2)
PROT UR STRIP.AUTO-MCNC: NEGATIVE MG/DL
RBC # BLD AUTO: 3.9 MILL/MM3 (ref 4.7–6.1)
RV RNA SPEC QL NAA+PROBE: NOT DETECTED
SALMONELLA DNA SPEC QL NAA+PROBE: NOT DETECTED
SAPOVIRUS RNA SPEC QL NAA+PROBE: NOT DETECTED
SHIGELLA SP+EIEC IPAH ST NAA+NON-PROBE: NOT DETECTED
SODIUM SERPL-SCNC: 131 MEQ/L (ref 135–145)
SODIUM UR-SCNC: < 20 MEQ/L
SP GR UR REFRACT.AUTO: 1.01 (ref 1–1.03)
UROBILINOGEN UR QL STRIP.AUTO: 0.2 EU/DL (ref 0–1)
V CHOLERAE DNA SPEC QL NAA+PROBE: NOT DETECTED
VIBRIO DNA SPEC NAA+PROBE: NOT DETECTED
WBC # BLD AUTO: 2.9 THOU/MM3 (ref 4.8–10.8)
Y ENTERO RECN STL QL NAA+PROBE: NOT DETECTED

## 2023-12-09 PROCEDURE — C9113 INJ PANTOPRAZOLE SODIUM, VIA: HCPCS

## 2023-12-09 PROCEDURE — 6370000000 HC RX 637 (ALT 250 FOR IP): Performed by: INTERNAL MEDICINE

## 2023-12-09 PROCEDURE — 93010 ELECTROCARDIOGRAM REPORT: CPT | Performed by: INTERNAL MEDICINE

## 2023-12-09 PROCEDURE — 6360000002 HC RX W HCPCS

## 2023-12-09 PROCEDURE — 81003 URINALYSIS AUTO W/O SCOPE: CPT

## 2023-12-09 PROCEDURE — 99232 SBSQ HOSP IP/OBS MODERATE 35: CPT | Performed by: INTERNAL MEDICINE

## 2023-12-09 PROCEDURE — 85027 COMPLETE CBC AUTOMATED: CPT

## 2023-12-09 PROCEDURE — 80048 BASIC METABOLIC PNL TOTAL CA: CPT

## 2023-12-09 PROCEDURE — 1200000003 HC TELEMETRY R&B

## 2023-12-09 PROCEDURE — 36415 COLL VENOUS BLD VENIPUNCTURE: CPT

## 2023-12-09 PROCEDURE — 87507 IADNA-DNA/RNA PROBE TQ 12-25: CPT

## 2023-12-09 PROCEDURE — 84300 ASSAY OF URINE SODIUM: CPT

## 2023-12-09 PROCEDURE — 82570 ASSAY OF URINE CREATININE: CPT

## 2023-12-09 PROCEDURE — 80307 DRUG TEST PRSMV CHEM ANLYZR: CPT

## 2023-12-09 PROCEDURE — 83935 ASSAY OF URINE OSMOLALITY: CPT

## 2023-12-09 RX ORDER — AMLODIPINE BESYLATE 5 MG/1
5 TABLET ORAL DAILY
Status: DISCONTINUED | OUTPATIENT
Start: 2023-12-09 | End: 2023-12-10 | Stop reason: HOSPADM

## 2023-12-09 RX ORDER — DOXAZOSIN 2 MG/1
2 TABLET ORAL DAILY
Status: DISCONTINUED | OUTPATIENT
Start: 2023-12-09 | End: 2023-12-10 | Stop reason: HOSPADM

## 2023-12-09 RX ADMIN — PANTOPRAZOLE SODIUM 40 MG: 40 INJECTION, POWDER, FOR SOLUTION INTRAVENOUS at 10:33

## 2023-12-09 RX ADMIN — AMLODIPINE BESYLATE 5 MG: 5 TABLET ORAL at 20:51

## 2023-12-09 RX ADMIN — DOXAZOSIN MESYLATE 2 MG: 2 TABLET ORAL at 20:51

## 2023-12-09 ASSESSMENT — PAIN SCALES - GENERAL: PAINLEVEL_OUTOF10: 3

## 2023-12-09 NOTE — PROGRESS NOTES
Advance Care Planning     Advance Care Planning Inpatient Note  Spiritual Care Department    Today's Date: 12/9/2023  Unit: [facility] 8A    Received request from IDT Member. Upon review of chart and communication with care team, patient's decision making abilities are not in question. . Patient was/were present in the room during visit. Goals of ACP Conversation:  Discuss advance care planning documents  Facilitate a discussion related to patient's goals of care as they align with the patient's values and beliefs. Health Care Decision Makers:       Primary Decision Maker: Tiara Chambers - Other Relative - [phone]  Summary:  No Decision Maker named by patient at this time    Advance Care Planning Documents (Patient Wishes):  Healthcare Power of /Advance Directive Appointment of Health Care Agent     Assessment:  The patient was encountered in his room while he was using the toilet. The patient stated to leave the document and he would look at it later. He did not show interest in a ACP discussion at this time. Interventions:  Patient DECLINED ACP conversation    Care Preferences Communicated:   No    Outcomes/Plan:  ACP Discussion: Refused    Electronically signed by Sophy Montalvo on 12/9/2023 at 10:59 AM           12/09/23 1058   Encounter Summary   Encounter Overview/Reason  Initial Encounter; Advance Care Planning   Service Provided For: Patient   Referral/Consult From: Multi-disciplinary team   Support System Unknown   Last Encounter  12/09/23   Complexity of Encounter Low   Begin Time 0955   End Time  1000   Total Time Calculated 5 min   Spiritual/Emotional needs   Type Spiritual Support   Advance Care Planning   Type Refused ACP conversation   Assessment/Intervention/Outcome   Assessment Coping   Intervention Other (Comment)  (Provided documents.)   Outcome Refused/Declined

## 2023-12-09 NOTE — CARE COORDINATION
12/09/23 1336   Service Assessment   Patient Orientation Alert and Oriented   Cognition Alert   History Provided By Patient   Primary Caregiver Self   Accompanied By/Relationship alone   Support Systems None   Patient's Healthcare Decision Maker is: Legal Next of Kin   PCP Verified by CM No  (he is unsure, ws scheduled with resident's clinic)   Prior Functional Level Independent in ADLs/IADLs   Current Functional Level Independent in ADLs/IADLs   Can patient return to prior living arrangement Unknown at present   Ability to make needs known: Fair   Family able to assist with home care needs: No   Would you like for me to discuss the discharge plan with any other family members/significant others, and if so, who? No   Financial Resources Medicare;Medicaid   Community Resources None   Discharge Planning   Type of Residence Apartment  (when able to stay there)   Living Arrangements Family Members   Current Services Prior To Admission None   Potential Assistance Needed N/A   DME Ordered? No   Potential Assistance Purchasing Medications No   Type of Home Care Services None   Patient expects to be discharged to: Unknown   Services At/After Discharge   Transition of Care Consult (CM Consult) Discharge Planning   Services At/After Discharge None   Mode of Transport at Discharge Other (see comment)  (cab)   Confirm Follow Up Transport Self  (walks)     Patient Goals/Plan/Treatment Preferences: Ritika Mcnally states he stays with his sister sometimes, other times he stays with friends or on the street. He knows of the soup kitchen. List of housing and food resources given. Patient states he does not have a telephone. He states [facility], however they were full at that time. Encouraged patient to utilize phone in the room to start calling places to find somewhere to stay at discharge if desired.      If patient is discharged prior to next notation, then this note serves as note for discharge by

## 2023-12-09 NOTE — PLAN OF CARE
Problem: Discharge Planning  Goal: Discharge to home or other facility with appropriate resources  Outcome: Progressing  Flowsheets (Taken 12/8/2023 5555 by Mya Lennon RN)  Discharge to home or other facility with appropriate resources: Identify barriers to discharge with patient and caregiver     Problem: Pain  Goal: Verbalizes/displays adequate comfort level or baseline comfort level  Outcome: Progressing     Problem: Safety - Adult  Goal: Free from fall injury  Outcome: Progressing     Problem: Respiratory - Adult  Goal: Achieves optimal ventilation and oxygenation  Outcome: Progressing     Problem: Cardiovascular - Adult  Goal: Maintains optimal cardiac output and hemodynamic stability  Outcome: Progressing  Goal: Absence of cardiac dysrhythmias or at baseline  Outcome: Progressing     Problem: Musculoskeletal - Adult  Goal: Return mobility to safest level of function  Outcome: Progressing     Problem: Metabolic/Fluid and Electrolytes - Adult  Goal: Electrolytes maintained within normal limits  Outcome: Progressing  Goal: Hemodynamic stability and optimal renal function maintained  Outcome: Progressing  Goal: Glucose maintained within prescribed range  Outcome: Progressing     Problem: Hematologic - Adult  Goal: Maintains hematologic stability  Outcome: Progressing

## 2023-12-09 NOTE — PROGRESS NOTES
Hospitalist Progress Note      Patient:  Yue Carlton    Unit/Bed:8A-19/019-A  YOB: 1955  MRN: [id number]   Acct: [de-identified]   PCP: Elia David MD  Date of Admission: 12/8/2023    Assessment/Plan:    Possible syncope-so far all the cardiac markers are negative normal echo and the most recent 1 in no acute telemetry findings. MICKY is resolving creatinine is down to 1.3 today with hydration,        Most likely prerenal  3. Hypertension -blood pressure still elevated will adjust the medications, likely secondary to noncompliance. Will add Norvasc 5 mg and also Cardura 2 mg. The losartan was stopped because of MICKY. Recheck BMP in the morning and continue with hydration    Chief Complaint: Syncope    Initial H and P:-    49-year-old male with PMH of HTN, syncopal episode 2 months ago, CVA 5 years ago. He presents to the ED today due to a syncopal episode while at the soup kitchen. He was sitting in a chair and just had a drink of water when he had a witnessed syncopal episode. The next thing he remembers is being on the ground with people telling him he is going to go to the hospital.  Patient does not know if he had any shaking activity and denies loss of bowel and bladder function. He says he felt a little fuzzy after the event. He had an episode similar to this in October of this year. Patient drinks alcohol, and has history of marijuana and cocaine use as well as cigarettes. Echo 10/11/2023 showed EF 55 to 61%, grade 1 diastolic dysfunction, moderately dilated left atrium, severely dilated right atrium, mild mitral regurg, mild tricuspid regurg. The patient states he has had some chest pain in a bandlike fashion, described as a dull pain that started this morning. It is not worsened with activity. He also states he has been having diarrhea for the last 2 weeks.   Has had lacrimation and rhinorrhea

## 2023-12-10 VITALS
WEIGHT: 144.84 LBS | BODY MASS INDEX: 21.95 KG/M2 | DIASTOLIC BLOOD PRESSURE: 92 MMHG | TEMPERATURE: 98.6 F | OXYGEN SATURATION: 96 % | SYSTOLIC BLOOD PRESSURE: 135 MMHG | RESPIRATION RATE: 16 BRPM | HEIGHT: 68 IN | HEART RATE: 76 BPM

## 2023-12-10 LAB
ANION GAP SERPL CALC-SCNC: 11 MEQ/L (ref 8–16)
BUN SERPL-MCNC: 15 MG/DL (ref 7–22)
CALCIUM SERPL-MCNC: 8.4 MG/DL (ref 8.5–10.5)
CHLORIDE SERPL-SCNC: 95 MEQ/L (ref 98–111)
CO2 SERPL-SCNC: 24 MEQ/L (ref 23–33)
CREAT SERPL-MCNC: 1 MG/DL (ref 0.4–1.2)
DEPRECATED RDW RBC AUTO: 46 FL (ref 35–45)
ERYTHROCYTE [DISTWIDTH] IN BLOOD BY AUTOMATED COUNT: 12.2 % (ref 11.5–14.5)
GFR SERPL CREATININE-BSD FRML MDRD: > 60 ML/MIN/1.73M2
GLUCOSE SERPL-MCNC: 91 MG/DL (ref 70–108)
HCT VFR BLD AUTO: 36.9 % (ref 42–52)
HGB BLD-MCNC: 12.4 GM/DL (ref 14–18)
MCH RBC QN AUTO: 34.1 PG (ref 26–33)
MCHC RBC AUTO-ENTMCNC: 33.6 GM/DL (ref 32.2–35.5)
MCV RBC AUTO: 101.4 FL (ref 80–94)
PLATELET # BLD AUTO: 178 THOU/MM3 (ref 130–400)
PMV BLD AUTO: 10.5 FL (ref 9.4–12.4)
POTASSIUM SERPL-SCNC: 4 MEQ/L (ref 3.5–5.2)
RBC # BLD AUTO: 3.64 MILL/MM3 (ref 4.7–6.1)
SODIUM SERPL-SCNC: 130 MEQ/L (ref 135–145)
WBC # BLD AUTO: 2.7 THOU/MM3 (ref 4.8–10.8)

## 2023-12-10 PROCEDURE — 80048 BASIC METABOLIC PNL TOTAL CA: CPT

## 2023-12-10 PROCEDURE — 36415 COLL VENOUS BLD VENIPUNCTURE: CPT

## 2023-12-10 PROCEDURE — 6360000002 HC RX W HCPCS

## 2023-12-10 PROCEDURE — 85027 COMPLETE CBC AUTOMATED: CPT

## 2023-12-10 PROCEDURE — C9113 INJ PANTOPRAZOLE SODIUM, VIA: HCPCS

## 2023-12-10 PROCEDURE — 2580000003 HC RX 258

## 2023-12-10 PROCEDURE — 6370000000 HC RX 637 (ALT 250 FOR IP): Performed by: INTERNAL MEDICINE

## 2023-12-10 PROCEDURE — 99239 HOSP IP/OBS DSCHRG MGMT >30: CPT | Performed by: INTERNAL MEDICINE

## 2023-12-10 RX ORDER — PANTOPRAZOLE SODIUM 40 MG/1
40 TABLET, DELAYED RELEASE ORAL
Qty: 30 TABLET | Refills: 5 | Status: SHIPPED | OUTPATIENT
Start: 2023-12-10

## 2023-12-10 RX ORDER — AMLODIPINE BESYLATE 5 MG/1
5 TABLET ORAL DAILY
Qty: 30 TABLET | Refills: 3 | Status: SHIPPED | OUTPATIENT
Start: 2023-12-11 | End: 2023-12-10 | Stop reason: SDUPTHER

## 2023-12-10 RX ORDER — PANTOPRAZOLE SODIUM 40 MG/10ML
40 INJECTION, POWDER, LYOPHILIZED, FOR SOLUTION INTRAVENOUS DAILY
Qty: 30 EACH | Refills: 1 | Status: SHIPPED | OUTPATIENT
Start: 2023-12-11

## 2023-12-10 RX ORDER — DOXAZOSIN 2 MG/1
2 TABLET ORAL NIGHTLY
Qty: 30 TABLET | Refills: 3 | Status: SHIPPED | OUTPATIENT
Start: 2023-12-10 | End: 2023-12-10 | Stop reason: SDUPTHER

## 2023-12-10 RX ORDER — DOXAZOSIN 2 MG/1
2 TABLET ORAL NIGHTLY
Qty: 30 TABLET | Refills: 3 | Status: SHIPPED | OUTPATIENT
Start: 2023-12-10

## 2023-12-10 RX ORDER — AMLODIPINE BESYLATE 5 MG/1
5 TABLET ORAL DAILY
Qty: 30 TABLET | Refills: 3 | Status: SHIPPED | OUTPATIENT
Start: 2023-12-11

## 2023-12-10 RX ADMIN — SODIUM CHLORIDE, PRESERVATIVE FREE 10 ML: 5 INJECTION INTRAVENOUS at 09:01

## 2023-12-10 RX ADMIN — DOXAZOSIN MESYLATE 2 MG: 2 TABLET ORAL at 08:59

## 2023-12-10 RX ADMIN — PANTOPRAZOLE SODIUM 40 MG: 40 INJECTION, POWDER, FOR SOLUTION INTRAVENOUS at 08:59

## 2023-12-10 RX ADMIN — AMLODIPINE BESYLATE 5 MG: 5 TABLET ORAL at 08:59

## 2023-12-10 NOTE — PLAN OF CARE
Problem: Discharge Planning  Goal: Discharge to home or other facility with appropriate resources  Outcome: Progressing  Flowsheets (Taken 12/10/2023 1513)  Discharge to home or other facility with appropriate resources: Identify barriers to discharge with patient and caregiver     Problem: Pain  Goal: Verbalizes/displays adequate comfort level or baseline comfort level  Outcome: Progressing  Flowsheets (Taken 12/10/2023 1513)  Verbalizes/displays adequate comfort level or baseline comfort level: Encourage patient to monitor pain and request assistance     Problem: Safety - Adult  Goal: Free from fall injury  Outcome: Progressing  Flowsheets (Taken 12/10/2023 1513)  Free From Fall Injury: Instruct family/caregiver on patient safety     Problem: Respiratory - Adult  Goal: Achieves optimal ventilation and oxygenation  Outcome: Progressing  Flowsheets (Taken 12/10/2023 1513)  Achieves optimal ventilation and oxygenation: Assess for changes in respiratory status     Problem: Cardiovascular - Adult  Goal: Maintains optimal cardiac output and hemodynamic stability  Outcome: Progressing  Flowsheets (Taken 12/10/2023 1513)  Maintains optimal cardiac output and hemodynamic stability: Monitor blood pressure and heart rate

## 2023-12-10 NOTE — PLAN OF CARE
Problem: Discharge Planning  Goal: Discharge to home or other facility with appropriate resources  12/10/2023 1516 by Oliverio Felix RN  Outcome: Adequate for Discharge  12/10/2023 1513 by Oliverio Felix RN  Outcome: Progressing  Flowsheets (Taken 12/10/2023 1513)  Discharge to home or other facility with appropriate resources: Identify barriers to discharge with patient and caregiver     Problem: Pain  Goal: Verbalizes/displays adequate comfort level or baseline comfort level  12/10/2023 1516 by Oliverio Felix RN  Outcome: Adequate for Discharge  12/10/2023 1513 by Oliverio Felix RN  Outcome: Progressing  Flowsheets (Taken 12/10/2023 1513)  Verbalizes/displays adequate comfort level or baseline comfort level: Encourage patient to monitor pain and request assistance     Problem: Safety - Adult  Goal: Free from fall injury  12/10/2023 1516 by Oliverio Felix RN  Outcome: Adequate for Discharge  12/10/2023 1513 by Oliverio Felix RN  Outcome: Progressing  Flowsheets (Taken 12/10/2023 1513)  Free From Fall Injury: Instruct family/caregiver on patient safety     Problem: Respiratory - Adult  Goal: Achieves optimal ventilation and oxygenation  12/10/2023 1516 by Oliverio Felix RN  Outcome: Adequate for Discharge  12/10/2023 1513 by Oliverio Felix RN  Outcome: Progressing  Flowsheets (Taken 12/10/2023 1513)  Achieves optimal ventilation and oxygenation: Assess for changes in respiratory status     Problem: Cardiovascular - Adult  Goal: Maintains optimal cardiac output and hemodynamic stability  12/10/2023 1516 by Oliverio Felix RN  Outcome: Adequate for Discharge  12/10/2023 1513 by Oliverio Felix RN  Outcome: Progressing  Flowsheets (Taken 12/10/2023 1513)  Maintains optimal cardiac output and hemodynamic stability: Monitor blood pressure and heart rate  Goal: Absence of cardiac dysrhythmias or at baseline  Outcome: Adequate for Discharge     Problem:

## 2023-12-10 NOTE — PLAN OF CARE
Problem: Discharge Planning  Goal: Discharge to home or other facility with appropriate resources  Outcome: Progressing     Problem: Pain  Goal: Verbalizes/displays adequate comfort level or baseline comfort level  Outcome: Progressing     Problem: Safety - Adult  Goal: Free from fall injury  Outcome: Progressing     Problem: Respiratory - Adult  Goal: Achieves optimal ventilation and oxygenation  Outcome: Progressing     Problem: Cardiovascular - Adult  Goal: Maintains optimal cardiac output and hemodynamic stability  Outcome: Progressing  Goal: Absence of cardiac dysrhythmias or at baseline  Outcome: Progressing     Problem: Musculoskeletal - Adult  Goal: Return mobility to safest level of function  Outcome: Progressing     Problem: Metabolic/Fluid and Electrolytes - Adult  Goal: Electrolytes maintained within normal limits  Outcome: Progressing  Goal: Hemodynamic stability and optimal renal function maintained  Outcome: Progressing  Goal: Glucose maintained within prescribed range  Outcome: Progressing     Problem: Hematologic - Adult  Goal: Maintains hematologic stability  Outcome: Progressing

## 2023-12-10 NOTE — DISCHARGE SUMMARY
Weight: 65.7 kg (144 lb 13.5 oz)      Height: 1.727 m (5' 8\")        Weight: Weight - Scale: 65.7 kg (144 lb 13.5 oz)     General appearance: No apparent distress, well developed, appears stated age. Eyes:  Pupils equal, round, and reactive to light. Conjunctivae/corneas clear. HENT: Head normal in appearance. External nares normal.  Oral mucosa moist without lesions. Hearing grossly intact. Neck: Supple, with full range of motion. Trachea midline. No gross JVD appreciated. Respiratory:  Normal respiratory effort. Clear to auscultation, bilaterally without rales or wheezes or rhonchi. Cardiovascular: Normal rate, regular rhythm with normal S1/S2 without murmurs. No lower extremity edema. Abdomen: Soft, non-tender, non-distended with normal bowel sounds. Musculoskeletal: There is no joint swelling or tenderness. Normal tone. No abnormal movements. Skin: Warm and dry. No rashes or lesions. Neurologic:  No focal sensory/motor deficits in the upper and lower extremities. Cranial nerves:  grossly non-focal 2-12. Psychiatric: Alert and oriented, normal insight and thought content. Capillary Refill: Brisk,< 3 seconds. Peripheral Pulses: +2 palpable, equal bilaterally. Significant Diagnostic Studies    Labs: For convenience and continuity at follow-up the following most recent labs are provided:  CBC:    Lab Results   Component Value Date/Time    WBC 2.7 12/10/2023 05:52 AM    HGB 12.4 12/10/2023 05:52 AM    HCT 36.9 12/10/2023 05:52 AM     12/10/2023 05:52 AM     Renal:    Lab Results   Component Value Date/Time     12/10/2023 05:52 AM    K 4.0 12/10/2023 05:52 AM    K 4.3 10/19/2023 05:32 AM    CL 95 12/10/2023 05:52 AM    CO2 24 12/10/2023 05:52 AM    BUN 15 12/10/2023 05:52 AM    CREATININE 1.0 12/10/2023 05:52 AM    CALCIUM 8.4 12/10/2023 05:52 AM       Radiology:   XR CHEST PORTABLE   Final Result   There is no acute intrathoracic process.                **This report has been

## 2023-12-10 NOTE — DISCHARGE INSTR - DIET

## 2023-12-12 ENCOUNTER — TELEPHONE (OUTPATIENT)
Dept: FAMILY MEDICINE CLINIC | Age: 68
End: 2023-12-12

## 2023-12-12 NOTE — TELEPHONE ENCOUNTER
Care Transitions Initial Follow Up Call    Outreach made within 2 business days of discharge: Yes    Patient: Rhoda Mays Patient : 1955   MRN: [id number]  Reason for Admission: There are no discharge diagnoses documented for the most recent discharge. Discharge Date: 12/10/23       Spoke with: Attempted to contact pt, phone number is [facility]. It states in chart pt is homeless & pt contact does not want to be contacted about him. Spoke to [address] [place] [address] Sarah Beth confirmed their office has not seen pt to establish care he is a consistent no show. Discharge department/facility: [facility]      Scheduled appointment with PCP within 7-14 days    Follow Up  No future appointments.     Jeannie Nam, CMA

## 2024-01-03 ENCOUNTER — HOSPITAL ENCOUNTER (INPATIENT)
Age: 69
LOS: 13 days | Discharge: HOME | DRG: 393 | End: 2024-01-16
Attending: EMERGENCY MEDICINE | Admitting: STUDENT IN AN ORGANIZED HEALTH CARE EDUCATION/TRAINING PROGRAM
Payer: MEDICARE

## 2024-01-03 ENCOUNTER — APPOINTMENT (OUTPATIENT)
Dept: ULTRASOUND IMAGING | Age: 69
DRG: 393 | End: 2024-01-03
Payer: MEDICARE

## 2024-01-03 ENCOUNTER — APPOINTMENT (OUTPATIENT)
Dept: CT IMAGING | Age: 69
DRG: 393 | End: 2024-01-03
Payer: MEDICARE

## 2024-01-03 ENCOUNTER — APPOINTMENT (OUTPATIENT)
Dept: GENERAL RADIOLOGY | Age: 69
DRG: 393 | End: 2024-01-03
Payer: MEDICARE

## 2024-01-03 DIAGNOSIS — R19.7 DIARRHEA, UNSPECIFIED TYPE: ICD-10-CM

## 2024-01-03 DIAGNOSIS — R55 SYNCOPE AND COLLAPSE: ICD-10-CM

## 2024-01-03 DIAGNOSIS — E86.0 DEHYDRATION: Primary | ICD-10-CM

## 2024-01-03 PROBLEM — R42 DIZZINESS: Status: ACTIVE | Noted: 2024-01-03

## 2024-01-03 LAB
ALBUMIN SERPL BCG-MCNC: 4.1 G/DL (ref 3.5–5.1)
ALP SERPL-CCNC: 74 U/L (ref 38–126)
ALT SERPL W/O P-5'-P-CCNC: 38 U/L (ref 11–66)
ANION GAP SERPL CALC-SCNC: 19 MEQ/L (ref 8–16)
AST SERPL-CCNC: 71 U/L (ref 5–40)
BASOPHILS ABSOLUTE: 0 THOU/MM3 (ref 0–0.1)
BASOPHILS NFR BLD AUTO: 0.3 %
BILIRUB CONJ SERPL-MCNC: < 0.2 MG/DL (ref 0–0.3)
BILIRUB SERPL-MCNC: 0.6 MG/DL (ref 0.3–1.2)
BUN SERPL-MCNC: 21 MG/DL (ref 7–22)
CALCIUM SERPL-MCNC: 8.9 MG/DL (ref 8.5–10.5)
CHLORIDE SERPL-SCNC: 89 MEQ/L (ref 98–111)
CO2 SERPL-SCNC: 20 MEQ/L (ref 23–33)
CREAT SERPL-MCNC: 1.6 MG/DL (ref 0.4–1.2)
DEPRECATED RDW RBC AUTO: 43.8 FL (ref 35–45)
EOSINOPHIL NFR BLD AUTO: 0.3 %
EOSINOPHILS ABSOLUTE: 0 THOU/MM3 (ref 0–0.4)
ERYTHROCYTE [DISTWIDTH] IN BLOOD BY AUTOMATED COUNT: 12 % (ref 11.5–14.5)
ETHANOL SERPL-MCNC: < 0.01 %
FLUAV RNA RESP QL NAA+PROBE: NOT DETECTED
FLUBV RNA RESP QL NAA+PROBE: NOT DETECTED
GFR SERPL CREATININE-BSD FRML MDRD: 46 ML/MIN/1.73M2
GLUCOSE SERPL-MCNC: 79 MG/DL (ref 70–108)
HCT VFR BLD AUTO: 37.1 % (ref 42–52)
HGB BLD-MCNC: 12.7 GM/DL (ref 14–18)
IMM GRANULOCYTES # BLD AUTO: 0.03 THOU/MM3 (ref 0–0.07)
IMM GRANULOCYTES NFR BLD AUTO: 0.9 %
LACTATE SERPL-SCNC: 1.1 MMOL/L (ref 0.5–2)
LACTATE SERPL-SCNC: 2.6 MMOL/L (ref 0.5–2)
LACTIC ACID, SEPSIS: 2.6 MMOL/L (ref 0.5–1.9)
LIPASE SERPL-CCNC: 18.1 U/L (ref 5.6–51.3)
LYMPHOCYTES ABSOLUTE: 0.9 THOU/MM3 (ref 1–4.8)
LYMPHOCYTES NFR BLD AUTO: 24.7 %
MAGNESIUM SERPL-MCNC: 2 MG/DL (ref 1.6–2.4)
MCH RBC QN AUTO: 33.8 PG (ref 26–33)
MCHC RBC AUTO-ENTMCNC: 34.2 GM/DL (ref 32.2–35.5)
MCV RBC AUTO: 98.7 FL (ref 80–94)
MONOCYTES ABSOLUTE: 0.4 THOU/MM3 (ref 0.4–1.3)
MONOCYTES NFR BLD AUTO: 11.1 %
NEUTROPHILS NFR BLD AUTO: 62.7 %
NRBC BLD AUTO-RTO: 0 /100 WBC
OSMOLALITY SERPL CALC.SUM OF ELEC: 259 MOSMOL/KG (ref 275–300)
PLATELET # BLD AUTO: 253 THOU/MM3 (ref 130–400)
PMV BLD AUTO: 9.6 FL (ref 9.4–12.4)
POTASSIUM SERPL-SCNC: 4.1 MEQ/L (ref 3.5–5.2)
PROT SERPL-MCNC: 8 G/DL (ref 6.1–8)
RBC # BLD AUTO: 3.76 MILL/MM3 (ref 4.7–6.1)
SARS-COV-2 RNA RESP QL NAA+PROBE: NOT DETECTED
SEGMENTED NEUTROPHILS ABSOLUTE COUNT: 2.2 THOU/MM3 (ref 1.8–7.7)
SODIUM SERPL-SCNC: 128 MEQ/L (ref 135–145)
T4 FREE SERPL-MCNC: 1.66 NG/DL (ref 0.93–1.76)
TROPONIN, HIGH SENSITIVITY: 10 NG/L (ref 0–12)
TROPONIN, HIGH SENSITIVITY: 12 NG/L (ref 0–12)
TROPONIN, HIGH SENSITIVITY: 14 NG/L (ref 0–12)
TSH SERPL DL<=0.005 MIU/L-ACNC: 4.56 UIU/ML (ref 0.4–4.2)
WBC # BLD AUTO: 3.5 THOU/MM3 (ref 4.8–10.8)

## 2024-01-03 PROCEDURE — 1200000003 HC TELEMETRY R&B

## 2024-01-03 PROCEDURE — 70450 CT HEAD/BRAIN W/O DYE: CPT

## 2024-01-03 PROCEDURE — 84484 ASSAY OF TROPONIN QUANT: CPT

## 2024-01-03 PROCEDURE — 6370000000 HC RX 637 (ALT 250 FOR IP)

## 2024-01-03 PROCEDURE — 83690 ASSAY OF LIPASE: CPT

## 2024-01-03 PROCEDURE — 71045 X-RAY EXAM CHEST 1 VIEW: CPT

## 2024-01-03 PROCEDURE — 84443 ASSAY THYROID STIM HORMONE: CPT

## 2024-01-03 PROCEDURE — 84439 ASSAY OF FREE THYROXINE: CPT

## 2024-01-03 PROCEDURE — 96361 HYDRATE IV INFUSION ADD-ON: CPT

## 2024-01-03 PROCEDURE — 76705 ECHO EXAM OF ABDOMEN: CPT

## 2024-01-03 PROCEDURE — 96374 THER/PROPH/DIAG INJ IV PUSH: CPT

## 2024-01-03 PROCEDURE — 87636 SARSCOV2 & INF A&B AMP PRB: CPT

## 2024-01-03 PROCEDURE — 36415 COLL VENOUS BLD VENIPUNCTURE: CPT

## 2024-01-03 PROCEDURE — 82248 BILIRUBIN DIRECT: CPT

## 2024-01-03 PROCEDURE — 99223 1ST HOSP IP/OBS HIGH 75: CPT | Performed by: STUDENT IN AN ORGANIZED HEALTH CARE EDUCATION/TRAINING PROGRAM

## 2024-01-03 PROCEDURE — 99285 EMERGENCY DEPT VISIT HI MDM: CPT

## 2024-01-03 PROCEDURE — 85025 COMPLETE CBC W/AUTO DIFF WBC: CPT

## 2024-01-03 PROCEDURE — 2580000003 HC RX 258

## 2024-01-03 PROCEDURE — 83605 ASSAY OF LACTIC ACID: CPT

## 2024-01-03 PROCEDURE — 87040 BLOOD CULTURE FOR BACTERIA: CPT

## 2024-01-03 PROCEDURE — 83735 ASSAY OF MAGNESIUM: CPT

## 2024-01-03 PROCEDURE — 6360000002 HC RX W HCPCS

## 2024-01-03 PROCEDURE — 96375 TX/PRO/DX INJ NEW DRUG ADDON: CPT

## 2024-01-03 PROCEDURE — 80053 COMPREHEN METABOLIC PANEL: CPT

## 2024-01-03 PROCEDURE — 1200000000 HC SEMI PRIVATE

## 2024-01-03 PROCEDURE — 93005 ELECTROCARDIOGRAM TRACING: CPT

## 2024-01-03 PROCEDURE — 82077 ASSAY SPEC XCP UR&BREATH IA: CPT

## 2024-01-03 RX ORDER — ACETAMINOPHEN 650 MG/1
650 SUPPOSITORY RECTAL EVERY 6 HOURS PRN
Status: DISCONTINUED | OUTPATIENT
Start: 2024-01-03 | End: 2024-01-16 | Stop reason: HOSPADM

## 2024-01-03 RX ORDER — THIAMINE HYDROCHLORIDE 100 MG/ML
100 INJECTION, SOLUTION INTRAMUSCULAR; INTRAVENOUS ONCE
Status: COMPLETED | OUTPATIENT
Start: 2024-01-03 | End: 2024-01-03

## 2024-01-03 RX ORDER — ONDANSETRON 2 MG/ML
4 INJECTION INTRAMUSCULAR; INTRAVENOUS ONCE
Status: COMPLETED | OUTPATIENT
Start: 2024-01-03 | End: 2024-01-03

## 2024-01-03 RX ORDER — POLYETHYLENE GLYCOL 3350 17 G/17G
17 POWDER, FOR SOLUTION ORAL DAILY PRN
Status: DISCONTINUED | OUTPATIENT
Start: 2024-01-03 | End: 2024-01-16 | Stop reason: HOSPADM

## 2024-01-03 RX ORDER — ENOXAPARIN SODIUM 100 MG/ML
40 INJECTION SUBCUTANEOUS DAILY
Status: DISCONTINUED | OUTPATIENT
Start: 2024-01-03 | End: 2024-01-16 | Stop reason: HOSPADM

## 2024-01-03 RX ORDER — ONDANSETRON 4 MG/1
4 TABLET, ORALLY DISINTEGRATING ORAL EVERY 8 HOURS PRN
Status: DISCONTINUED | OUTPATIENT
Start: 2024-01-03 | End: 2024-01-16 | Stop reason: HOSPADM

## 2024-01-03 RX ORDER — SODIUM CHLORIDE 0.9 % (FLUSH) 0.9 %
5-40 SYRINGE (ML) INJECTION PRN
Status: DISCONTINUED | OUTPATIENT
Start: 2024-01-03 | End: 2024-01-16 | Stop reason: HOSPADM

## 2024-01-03 RX ORDER — MORPHINE SULFATE 2 MG/ML
2 INJECTION, SOLUTION INTRAMUSCULAR; INTRAVENOUS ONCE
Status: DISCONTINUED | OUTPATIENT
Start: 2024-01-03 | End: 2024-01-03

## 2024-01-03 RX ORDER — MAGNESIUM SULFATE IN WATER 40 MG/ML
2000 INJECTION, SOLUTION INTRAVENOUS PRN
Status: DISCONTINUED | OUTPATIENT
Start: 2024-01-03 | End: 2024-01-16 | Stop reason: HOSPADM

## 2024-01-03 RX ORDER — CALCIUM CARBONATE 500 MG/1
500 TABLET, CHEWABLE ORAL 3 TIMES DAILY PRN
Status: DISCONTINUED | OUTPATIENT
Start: 2024-01-03 | End: 2024-01-16 | Stop reason: HOSPADM

## 2024-01-03 RX ORDER — SODIUM CHLORIDE 0.9 % (FLUSH) 0.9 %
5-40 SYRINGE (ML) INJECTION EVERY 12 HOURS SCHEDULED
Status: DISCONTINUED | OUTPATIENT
Start: 2024-01-03 | End: 2024-01-16 | Stop reason: HOSPADM

## 2024-01-03 RX ORDER — LANOLIN ALCOHOL/MO/W.PET/CERES
3 CREAM (GRAM) TOPICAL NIGHTLY PRN
Status: DISCONTINUED | OUTPATIENT
Start: 2024-01-03 | End: 2024-01-16 | Stop reason: HOSPADM

## 2024-01-03 RX ORDER — ONDANSETRON 2 MG/ML
4 INJECTION INTRAMUSCULAR; INTRAVENOUS EVERY 6 HOURS PRN
Status: DISCONTINUED | OUTPATIENT
Start: 2024-01-03 | End: 2024-01-16 | Stop reason: HOSPADM

## 2024-01-03 RX ORDER — POTASSIUM CHLORIDE 20 MEQ/1
40 TABLET, EXTENDED RELEASE ORAL PRN
Status: DISCONTINUED | OUTPATIENT
Start: 2024-01-03 | End: 2024-01-16 | Stop reason: HOSPADM

## 2024-01-03 RX ORDER — ACETAMINOPHEN 325 MG/1
650 TABLET ORAL EVERY 6 HOURS PRN
Status: DISCONTINUED | OUTPATIENT
Start: 2024-01-03 | End: 2024-01-16 | Stop reason: HOSPADM

## 2024-01-03 RX ORDER — POTASSIUM CHLORIDE 7.45 MG/ML
10 INJECTION INTRAVENOUS PRN
Status: DISCONTINUED | OUTPATIENT
Start: 2024-01-03 | End: 2024-01-16 | Stop reason: HOSPADM

## 2024-01-03 RX ORDER — SODIUM CHLORIDE 9 MG/ML
INJECTION, SOLUTION INTRAVENOUS CONTINUOUS
Status: DISCONTINUED | OUTPATIENT
Start: 2024-01-03 | End: 2024-01-09

## 2024-01-03 RX ORDER — 0.9 % SODIUM CHLORIDE 0.9 %
30 INTRAVENOUS SOLUTION INTRAVENOUS ONCE
Status: COMPLETED | OUTPATIENT
Start: 2024-01-03 | End: 2024-01-03

## 2024-01-03 RX ORDER — SODIUM CHLORIDE 9 MG/ML
INJECTION, SOLUTION INTRAVENOUS PRN
Status: DISCONTINUED | OUTPATIENT
Start: 2024-01-03 | End: 2024-01-16 | Stop reason: HOSPADM

## 2024-01-03 RX ORDER — PANTOPRAZOLE SODIUM 40 MG/1
40 TABLET, DELAYED RELEASE ORAL
Status: DISCONTINUED | OUTPATIENT
Start: 2024-01-04 | End: 2024-01-16 | Stop reason: HOSPADM

## 2024-01-03 RX ORDER — AMLODIPINE BESYLATE 5 MG/1
5 TABLET ORAL DAILY
Status: DISCONTINUED | OUTPATIENT
Start: 2024-01-03 | End: 2024-01-08

## 2024-01-03 RX ORDER — KETOROLAC TROMETHAMINE 30 MG/ML
15 INJECTION, SOLUTION INTRAMUSCULAR; INTRAVENOUS ONCE
Status: COMPLETED | OUTPATIENT
Start: 2024-01-03 | End: 2024-01-03

## 2024-01-03 RX ORDER — DOXAZOSIN 2 MG/1
2 TABLET ORAL NIGHTLY
Status: DISCONTINUED | OUTPATIENT
Start: 2024-01-03 | End: 2024-01-16 | Stop reason: HOSPADM

## 2024-01-03 RX ADMIN — Medication 3 MG: at 23:05

## 2024-01-03 RX ADMIN — ENOXAPARIN SODIUM 40 MG: 100 INJECTION SUBCUTANEOUS at 21:34

## 2024-01-03 RX ADMIN — ONDANSETRON 4 MG: 2 INJECTION INTRAMUSCULAR; INTRAVENOUS at 15:36

## 2024-01-03 RX ADMIN — SODIUM CHLORIDE 1932 ML: 9 INJECTION, SOLUTION INTRAVENOUS at 15:00

## 2024-01-03 RX ADMIN — SODIUM CHLORIDE: 9 INJECTION, SOLUTION INTRAVENOUS at 21:39

## 2024-01-03 RX ADMIN — KETOROLAC TROMETHAMINE 15 MG: 30 INJECTION, SOLUTION INTRAMUSCULAR; INTRAVENOUS at 15:35

## 2024-01-03 RX ADMIN — SODIUM CHLORIDE, PRESERVATIVE FREE 10 ML: 5 INJECTION INTRAVENOUS at 21:31

## 2024-01-03 RX ADMIN — THIAMINE HYDROCHLORIDE 100 MG: 100 INJECTION, SOLUTION INTRAMUSCULAR; INTRAVENOUS at 15:34

## 2024-01-03 RX ADMIN — ACETAMINOPHEN 650 MG: 325 TABLET ORAL at 23:04

## 2024-01-03 RX ADMIN — AMLODIPINE BESYLATE 5 MG: 5 TABLET ORAL at 21:32

## 2024-01-03 RX ADMIN — DOXAZOSIN 2 MG: 2 TABLET ORAL at 21:32

## 2024-01-03 ASSESSMENT — PAIN DESCRIPTION - FREQUENCY
FREQUENCY: INTERMITTENT
FREQUENCY: INTERMITTENT

## 2024-01-03 ASSESSMENT — PAIN SCALES - GENERAL
PAINLEVEL_OUTOF10: 8
PAINLEVEL_OUTOF10: 3
PAINLEVEL_OUTOF10: 2
PAINLEVEL_OUTOF10: 3
PAINLEVEL_OUTOF10: 8

## 2024-01-03 ASSESSMENT — PAIN - FUNCTIONAL ASSESSMENT
PAIN_FUNCTIONAL_ASSESSMENT: PREVENTS OR INTERFERES SOME ACTIVE ACTIVITIES AND ADLS
PAIN_FUNCTIONAL_ASSESSMENT: PREVENTS OR INTERFERES SOME ACTIVE ACTIVITIES AND ADLS
PAIN_FUNCTIONAL_ASSESSMENT: 0-10

## 2024-01-03 ASSESSMENT — PAIN DESCRIPTION - ONSET
ONSET: AWAKENED FROM SLEEP
ONSET: AWAKENED FROM SLEEP

## 2024-01-03 ASSESSMENT — PAIN DESCRIPTION - LOCATION
LOCATION: GENERALIZED
LOCATION: GENERALIZED
LOCATION: HEAD

## 2024-01-03 ASSESSMENT — LIFESTYLE VARIABLES
HOW OFTEN DO YOU HAVE A DRINK CONTAINING ALCOHOL: 4 OR MORE TIMES A WEEK
HOW MANY STANDARD DRINKS CONTAINING ALCOHOL DO YOU HAVE ON A TYPICAL DAY: 1 OR 2

## 2024-01-03 ASSESSMENT — PAIN DESCRIPTION - DESCRIPTORS
DESCRIPTORS: DULL;ACHING
DESCRIPTORS: ACHING
DESCRIPTORS: DULL;ACHING

## 2024-01-03 ASSESSMENT — PAIN DESCRIPTION - PAIN TYPE
TYPE: CHRONIC PAIN
TYPE: CHRONIC PAIN

## 2024-01-03 NOTE — H&P
as low as reasonably achievable. FINDINGS: There is no hemorrhage. There are no intra-or extra-axial collections.  The ventricles are mildly dilated. This is unchanged. There is a moderate amount of abnormal low attenuation in the white matter the brain. This is also unchanged. There is no midline shift or mass effect.  There are no new areas of abnormal attenuation. There is mucosal thickening which is circumferential in the maxillary sinuses. The other paranasal sinuses are normally aerated. The mastoid air cells are normally aerated. There is no suspicious calvarial abnormality.       1. Stable mildly dilated ventricular system with abnormal low attenuation along the ventricular margins. 2. No new abnormalities. **This report has been created using voice recognition software. It may contain minor errors which are inherent in voice recognition technology.** Final report electronically signed by Dr. Chrissie Brothers on 1/3/2024 3:43 PM    XR CHEST PORTABLE    Result Date: 1/3/2024  PROCEDURE: XR CHEST PORTABLE CLINICAL INFORMATION: Dizziness TECHNIQUE: Mobile AP chest radiograph. COMPARISON: Mobile AP chest radiograph 12/8/2023 FINDINGS: The bilateral costophrenic angles are excluded from the submitted image. Cardiac silhouette is within normal limits. Atherosclerotic ulceration's are present in the thoracic aorta. There are no lung consolidations. Soft tissues and osseous structures are unremarkable.     No acute intrathoracic process. **This report has been created using voice recognition software. It may contain minor errors which are inherent in voice recognition technology.** Final report electronically signed by Dr. Ronn King on 1/3/2024 2:57 PM         DVT prophylaxis:    [] Lovenox  [] SCDs  [] SQ Heparin  [] Encourage ambulation   [] Already on Anticoagulation  Diet: No diet orders on file  Code Status: Prior  PT/OT: no  Tele: yes  IVF: yes    Electronically signed by Salvador Kim MD on 1/3/2024

## 2024-01-03 NOTE — ED NOTES
ED to inpatient nurses report      Chief Complaint:  Chief Complaint   Patient presents with    Dizziness    Diarrhea    Fatigue    Nausea     Present to ED from: home    MOA:     LOC: alert and orientated to name, place, date  Mobility: Requires assistance * 1  Oxygen Baseline: RA    Current needs required: RA     Code Status:   Limited    What abnormal results were found and what did you give/do to treat them? Hypothermic. Received heated IV fluids through the ranger and Delmy hugger was placed.   Any procedures or intervention occur?     Mental Status:  Level of Consciousness: Alert (0)    Psych Assessment:        Vitals:  Patient Vitals for the past 24 hrs:   BP Temp Temp src Pulse Resp SpO2 Height Weight   01/03/24 1731 118/78 -- -- 91 15 97 % -- --   01/03/24 1648 (!) 119/27 -- -- 89 (!) 37 99 % -- --   01/03/24 1538 -- 97.1 °F (36.2 °C) Rectal 85 12 99 % -- --   01/03/24 1325 114/71 (!) 93.7 °F (34.3 °C) Rectal 75 28 99 % 1.727 m (5' 8\") 64.4 kg (142 lb)        LDAs:   Peripheral IV 01/03/24 Left Forearm (Active)   Site Assessment Clean, dry & intact 01/03/24 1407   Line Status Blood return noted 01/03/24 1407   Phlebitis Assessment No symptoms 01/03/24 1407   Infiltration Assessment 0 01/03/24 1407   Dressing Status New dressing applied;Clean, dry & intact 01/03/24 1407   Dressing Type Transparent 01/03/24 1407   Dressing Intervention New 01/03/24 1407       Ambulatory Status:  Presents to emergency department  because of falls (Syncope, seizure, or loss of consciousness): No, Age > 70: No, Altered Mental Status, Intoxication with alcohol or substance confusion (Disorientation, impaired judgment, poor safety awaremess, or inability to follow instructions): No, Impaired Mobility: Ambulates or transfers with assistive devices or assistance; Unable to ambulate or transer.: Yes    Diagnosis:  DISPOSITION Admitted 01/03/2024 05:52:17 PM   Final diagnoses:   Dehydration   Diarrhea, unspecified type   Syncope and

## 2024-01-03 NOTE — ED NOTES
Okay to remove the jeannine hugger per Dr. Cortes. Jeannine hugger removed and warm blankets given as replacement. Pt provided boxed lunch per request

## 2024-01-03 NOTE — ED TRIAGE NOTES
Pt to ED via [facility] with c/o lethargy, nausea, and diarrhea. Rian reports that [employer] stopped by pt's residence because they noticed him slumped over on the porch. During triage unable to obtain a oral or axillary temp so did rectally. Rectal temp is 93.7. Pt states that he was walking around for a while but is unsure how long. Pt arrived in multiple layers of clothing. Pt shivering during triage. Rian reports alcohol use today. Pt states he only drank a tiny bit. Pt states he was recently positive for covid earlier in December. A&Ox4. Pt reswabbed upon arrival. Delmy hugger placed. Peripheral line establish. EKG complete.

## 2024-01-03 NOTE — DISCHARGE INSTRUCTIONS
Follow up with your PCP in 1 week.    We will increase your amlodipine dose in the morning.   We are sending you with a stool softener or laxative to help with your constipation take either one or both until you have a formed stool.

## 2024-01-04 ENCOUNTER — APPOINTMENT (OUTPATIENT)
Dept: CT IMAGING | Age: 69
DRG: 393 | End: 2024-01-04
Payer: MEDICARE

## 2024-01-04 PROBLEM — K21.9 GASTROESOPHAGEAL REFLUX DISEASE: Status: ACTIVE | Noted: 2024-01-04

## 2024-01-04 PROBLEM — I95.1 SYNCOPE DUE TO ORTHOSTATIC HYPOTENSION: Status: ACTIVE | Noted: 2024-01-04

## 2024-01-04 PROBLEM — R19.7 DIARRHEA: Status: ACTIVE | Noted: 2024-01-04

## 2024-01-04 PROBLEM — N17.9 AKI (ACUTE KIDNEY INJURY) (HCC): Status: ACTIVE | Noted: 2024-01-04

## 2024-01-04 PROBLEM — E87.20 LACTIC ACIDOSIS: Status: ACTIVE | Noted: 2024-01-04

## 2024-01-04 PROBLEM — N40.0 BENIGN PROSTATIC HYPERPLASIA WITHOUT LOWER URINARY TRACT SYMPTOMS: Status: ACTIVE | Noted: 2024-01-04

## 2024-01-04 PROBLEM — T68.XXXA HYPOTHERMIA: Status: ACTIVE | Noted: 2024-01-04

## 2024-01-04 LAB
AMPHETAMINES UR QL SCN: NEGATIVE
ANION GAP SERPL CALC-SCNC: 11 MEQ/L (ref 8–16)
BARBITURATES UR QL SCN: NEGATIVE
BENZODIAZ UR QL SCN: NEGATIVE
BUN SERPL-MCNC: 20 MG/DL (ref 7–22)
BZE UR QL SCN: NEGATIVE
CALCIUM SERPL-MCNC: 8.6 MG/DL (ref 8.5–10.5)
CANNABINOIDS UR QL SCN: NEGATIVE
CHLORIDE SERPL-SCNC: 100 MEQ/L (ref 98–111)
CO2 SERPL-SCNC: 20 MEQ/L (ref 23–33)
CREAT SERPL-MCNC: 1.2 MG/DL (ref 0.4–1.2)
DEPRECATED RDW RBC AUTO: 44.7 FL (ref 35–45)
EKG ATRIAL RATE: 77 BPM
EKG ATRIAL RATE: 83 BPM
EKG P AXIS: 76 DEGREES
EKG P AXIS: 84 DEGREES
EKG P-R INTERVAL: 160 MS
EKG P-R INTERVAL: 164 MS
EKG Q-T INTERVAL: 370 MS
EKG Q-T INTERVAL: 408 MS
EKG QRS DURATION: 72 MS
EKG QRS DURATION: 74 MS
EKG QTC CALCULATION (BAZETT): 434 MS
EKG QTC CALCULATION (BAZETT): 461 MS
EKG R AXIS: 70 DEGREES
EKG R AXIS: 80 DEGREES
EKG T AXIS: 55 DEGREES
EKG T AXIS: 72 DEGREES
EKG VENTRICULAR RATE: 77 BPM
EKG VENTRICULAR RATE: 83 BPM
ERYTHROCYTE [DISTWIDTH] IN BLOOD BY AUTOMATED COUNT: 12.1 % (ref 11.5–14.5)
FENTANYL: NEGATIVE
GFR SERPL CREATININE-BSD FRML MDRD: > 60 ML/MIN/1.73M2
GLUCOSE SERPL-MCNC: 80 MG/DL (ref 70–108)
HAV IGM SER QL: NEGATIVE
HBV CORE IGM SERPL QL IA: NEGATIVE
HBV SURFACE AG SERPL QL IA: NEGATIVE
HCT VFR BLD AUTO: 34.7 % (ref 42–52)
HCV IGG SERPL QL IA: POSITIVE
HEMOCCULT STL QL: NEGATIVE
HGB BLD-MCNC: 11.5 GM/DL (ref 14–18)
LACTATE SERPL-SCNC: 0.8 MMOL/L (ref 0.5–2)
LACTATE SERPL-SCNC: 1 MMOL/L (ref 0.5–2)
MAGNESIUM SERPL-MCNC: 2 MG/DL (ref 1.6–2.4)
MCH RBC QN AUTO: 33.2 PG (ref 26–33)
MCHC RBC AUTO-ENTMCNC: 33.1 GM/DL (ref 32.2–35.5)
MCV RBC AUTO: 100.3 FL (ref 80–94)
OPIATES UR QL SCN: NEGATIVE
OSMOLALITY SERPL CALC.SUM OF ELEC: 264.2 MOSMOL/KG (ref 275–300)
OXYCODONE: NEGATIVE
PCP UR QL SCN: NEGATIVE
PLATELET # BLD AUTO: 211 THOU/MM3 (ref 130–400)
PMV BLD AUTO: 9.9 FL (ref 9.4–12.4)
POTASSIUM SERPL-SCNC: 4.9 MEQ/L (ref 3.5–5.2)
RBC # BLD AUTO: 3.46 MILL/MM3 (ref 4.7–6.1)
SODIUM SERPL-SCNC: 131 MEQ/L (ref 135–145)
WBC # BLD AUTO: 3.6 THOU/MM3 (ref 4.8–10.8)

## 2024-01-04 PROCEDURE — 80307 DRUG TEST PRSMV CHEM ANLYZR: CPT

## 2024-01-04 PROCEDURE — 2580000003 HC RX 258

## 2024-01-04 PROCEDURE — 83735 ASSAY OF MAGNESIUM: CPT

## 2024-01-04 PROCEDURE — 1200000000 HC SEMI PRIVATE

## 2024-01-04 PROCEDURE — 85027 COMPLETE CBC AUTOMATED: CPT

## 2024-01-04 PROCEDURE — 71275 CT ANGIOGRAPHY CHEST: CPT

## 2024-01-04 PROCEDURE — 74177 CT ABD & PELVIS W/CONTRAST: CPT

## 2024-01-04 PROCEDURE — 6370000000 HC RX 637 (ALT 250 FOR IP)

## 2024-01-04 PROCEDURE — 80074 ACUTE HEPATITIS PANEL: CPT

## 2024-01-04 PROCEDURE — 82272 OCCULT BLD FECES 1-3 TESTS: CPT

## 2024-01-04 PROCEDURE — 1200000003 HC TELEMETRY R&B

## 2024-01-04 PROCEDURE — 80048 BASIC METABOLIC PNL TOTAL CA: CPT

## 2024-01-04 PROCEDURE — 99233 SBSQ HOSP IP/OBS HIGH 50: CPT | Performed by: STUDENT IN AN ORGANIZED HEALTH CARE EDUCATION/TRAINING PROGRAM

## 2024-01-04 PROCEDURE — 6360000004 HC RX CONTRAST MEDICATION

## 2024-01-04 PROCEDURE — 93010 ELECTROCARDIOGRAM REPORT: CPT | Performed by: INTERNAL MEDICINE

## 2024-01-04 PROCEDURE — 6360000002 HC RX W HCPCS

## 2024-01-04 PROCEDURE — 83605 ASSAY OF LACTIC ACID: CPT

## 2024-01-04 PROCEDURE — 36415 COLL VENOUS BLD VENIPUNCTURE: CPT

## 2024-01-04 RX ORDER — POLYETHYLENE GLYCOL 3350 17 G/17G
17 POWDER, FOR SOLUTION ORAL DAILY
Status: DISCONTINUED | OUTPATIENT
Start: 2024-01-04 | End: 2024-01-05

## 2024-01-04 RX ORDER — FLUTICASONE PROPIONATE 50 MCG
1 SPRAY, SUSPENSION (ML) NASAL DAILY
Status: DISCONTINUED | OUTPATIENT
Start: 2024-01-04 | End: 2024-01-16 | Stop reason: HOSPADM

## 2024-01-04 RX ADMIN — DOXAZOSIN 2 MG: 2 TABLET ORAL at 20:46

## 2024-01-04 RX ADMIN — PANTOPRAZOLE SODIUM 40 MG: 40 TABLET, DELAYED RELEASE ORAL at 05:33

## 2024-01-04 RX ADMIN — AZITHROMYCIN MONOHYDRATE 500 MG: 500 INJECTION, POWDER, LYOPHILIZED, FOR SOLUTION INTRAVENOUS at 19:01

## 2024-01-04 RX ADMIN — AMLODIPINE BESYLATE 5 MG: 5 TABLET ORAL at 08:44

## 2024-01-04 RX ADMIN — ENOXAPARIN SODIUM 40 MG: 100 INJECTION SUBCUTANEOUS at 08:44

## 2024-01-04 RX ADMIN — SODIUM CHLORIDE: 9 INJECTION, SOLUTION INTRAVENOUS at 14:32

## 2024-01-04 RX ADMIN — IOPAMIDOL 85 ML: 755 INJECTION, SOLUTION INTRAVENOUS at 12:18

## 2024-01-04 RX ADMIN — CEFTRIAXONE SODIUM 1000 MG: 1 INJECTION, POWDER, FOR SOLUTION INTRAMUSCULAR; INTRAVENOUS at 17:08

## 2024-01-04 RX ADMIN — ONDANSETRON 4 MG: 2 INJECTION INTRAMUSCULAR; INTRAVENOUS at 08:44

## 2024-01-04 RX ADMIN — FLUTICASONE PROPIONATE 1 SPRAY: 50 SPRAY, METERED NASAL at 14:35

## 2024-01-04 RX ADMIN — ACETAMINOPHEN 650 MG: 325 TABLET ORAL at 14:35

## 2024-01-04 ASSESSMENT — PAIN SCALES - GENERAL
PAINLEVEL_OUTOF10: 0
PAINLEVEL_OUTOF10: 6
PAINLEVEL_OUTOF10: 0
PAINLEVEL_OUTOF10: 0

## 2024-01-04 NOTE — CARE COORDINATION
01/04/24 1016   Readmission Assessment   Number of Days since last admission? 1-7 days   Previous Disposition Other (comment)  (Discharged to sisters.)   Who is being Interviewed Patient   What was the patient's/caregiver's perception as to why they think they needed to return back to the hospital? Other (Comment)  (Police found pt.)   Did you visit your Primary Care Physician after you left the hospital, before you returned this time? No   Why weren't you able to visit your PCP? Did not have an appointment;Other (Comment)  (Pt didnt make appt. (Hx of noncompliance per medical record))   Did you see a specialist, such as Cardiac, Pulmonary, Orthopedic Physician, etc. after you left the hospital? No   Who advised the patient to return to the hospital? Other (Comment)  (Police found pt and brought him in.)   Does the patient report anything that got in the way of taking their medications? No  (Pt resistant to speak to this CM because he is tired.)   In our efforts to provide the best possible care to you and others like you, can you think of anything that we could have done to help you after you left the hospital the first time, so that you might not have needed to return so soon? Other (Comment)  (Pt does not want to speak as he is tired. Hx of noncompliance.)

## 2024-01-04 NOTE — CARE COORDINATION
01/04/24 1020   Service Assessment   Patient Orientation Alert and Oriented   Cognition Alert   History Provided By Medical Record   Primary Caregiver Self   Support Systems Family Members   Patient's Healthcare Decision Maker is: Legal Next of Kin   PCP Verified by CM No   Last Visit to PCP   (Pt did not follow up/make appt at PCP office and has a hx of \"no shows\" there.)   Prior Functional Level Independent in ADLs/IADLs   Current Functional Level Independent in ADLs/IADLs   Can patient return to prior living arrangement Other (see comment)  (Plans to go to hotel.)   Ability to make needs known: Fair   Family able to assist with home care needs: No   Would you like for me to discuss the discharge plan with any other family members/significant others, and if so, who? No   Financial Resources Medicaid;Medicare   Community Resources Transportation  (City bus)   Social/Functional History   Lives With Other (comment)  (Hotel)   Type of Home Homeless   Active  No   Discharge Planning   Type of Residence Other (Comment);Homeless  (Occassionally stays in motels/hotels.)   Living Arrangements Alone   Current Services Prior To Admission None   Potential Assistance Purchasing Medications No   Type of Home Care Services None   Patient expects to be discharged to: Unknown   Services At/After Discharge   Mode of Transport at Discharge Other (see comment)  (cab or walk)   Confirm Follow Up Transport Self

## 2024-01-04 NOTE — CARE COORDINATION
1/4/24, 9:01 AM EST      DISCHARGE PLANNING EVALUATION    Ronn Ramires  Admitted: 1/3/2024  Hospital Day: 1    Location: -21/021-A Reason for admit: Syncope and collapse [R55]  Dehydration [E86.0]  Dizziness [R42]  Diarrhea, unspecified type [R19.7]    Past Medical History:   Diagnosis Date    Asthma     Cerebral artery occlusion with cerebral infarction (HCC)     Chronic kidney disease     Diarrhea     Hemodialysis patient (HCC)     previous    Hypertension     Liver disease     Psychiatric problem        Procedure: No  Barriers to Discharge:  Readmitted from ER with dizziness, diarrhea, fatigue, nausea. Pt reports Covid couple weeks ago. Found outside slumped on a porch. Police brought pt in. Hypothermic 93.7 R. Hx homelessness. Na+ 128. Creat 1.6. LA 2.6. Significant diarrhea in ER. Hx non-compliance. Orthostatic VS +. SW consulted to address social difficulties.     PCP: Camilo Guadalupe MD    Readmission Risk Low 0-14, Mod 15-19), High > 20: Readmission Risk Score: 18.1      Advance Directives:      Code Status: Limited   Patient's Primary Decision Maker is: Legal Next of Kin    Primary Decision Maker: GARRETT MCCARTNEY - Other Relative - [phone]    Patient Goals/Plan/Treatment Preferences: Met with Ronn today. He was not very cooperative with his interviews.  He said he is tired. This CM explained that I need to chat with him in order to address his needs. Still reluctant. He stays in hotels. He occasionally stays with a sister but is really not permitted to. He walks or uses public transportation. He was set up with a PCP but has been non-compliant with presenting for those visits. He is insured and manages to get his medications.CM to follow for needs.     Transportation/Food Security/Housekeeping Addressed: No issues identified.     If patient is discharged prior to next notation, then this note serves as note for discharge by case management.

## 2024-01-04 NOTE — ED PROVIDER NOTES
[facility]  EMERGENCY DEPARTMENT ENCOUNTER   PHYSICIAN ATTESTATION    Pt Name: Ronn Ramires  MRN: [id number]  Birthdate 1955  Date of evaluation: 9/12/20      I personally saw and examined the patient. I have reviewed and agree with the Resident findings, including all diagnostic interpretations and treatment plans as written. I was present for the key portion of any procedures performed and the inclusive time noted in any critical care statement.       History:     68-year-old male found slumped over this afternoon by [employer].  He is homeless and reportedly an alcoholic.  Initially hypothermic.  Although it came up very quickly.  He seems to be alert while here.  He has been having diarrhea and it sounds like he has had poor intake over the last few days.  Lactic noted to be elevated at 2.6.  0 alcohol level but at this point no evidence of withdrawal although needs to be on the watch for that.  We have discussed case with the hospitalist service to arrange for admission    Diagnosis: Syncope, hypothermia                DO Dee Ambrose Lawrence, DO  01/03/24 2041    
components:    Osmolality Calc 259.0 (*)     All other components within normal limits   COVID-19 & INFLUENZA COMBO   CULTURE, BLOOD 1   CULTURE, BLOOD 1   ETHANOL   LIPASE   MAGNESIUM   URINE DRUG SCREEN   T4, FREE   POCT GLUCOSE     All laboratory results are individually reviewed and interpreted by me in the clinical context of this patient.  See ED course below for results interpretation if applicable.  (Any cultures that may have been sent were not resulted at the time of this patient ED visit)      Radiologic studies results available at the moment of this note (None if blank):  CT HEAD WO CONTRAST   Final Result       1. Stable mildly dilated ventricular system with abnormal low attenuation along the ventricular margins.   2. No new abnormalities.               **This report has been created using voice recognition software. It may contain minor errors which are inherent in voice recognition technology.**      Final report electronically signed by Dr. Chrissie Brothers on 1/3/2024 3:43 PM      XR CHEST PORTABLE   Final Result      No acute intrathoracic process.               **This report has been created using voice recognition software. It may contain minor errors which are inherent in voice recognition technology.**      Final report electronically signed by Dr. Ronn King on 1/3/2024 2:57 PM        See ED course below for my interpretation if applicable.  All radiology images independently reviewed by me in the clinical context of this patient, in addition to interpretation provided by the radiologist.      EKG interpretation (none if blank):  EKG is interpreted by me  Normal sinus rhythm with a rate approximately 75  WY interval 160 milliseconds  QRS 72 ms  QTc 461 ms  All EKG results are individually reviewed and interpreted by me in the clinical context of this patient.  All EKGs are also interpreted by our Cardiology department, final interpretation may not be available as of the writing of this

## 2024-01-05 LAB
ANION GAP SERPL CALC-SCNC: 11 MEQ/L (ref 8–16)
BUN SERPL-MCNC: 11 MG/DL (ref 7–22)
CALCIUM SERPL-MCNC: 8.5 MG/DL (ref 8.5–10.5)
CHLORIDE SERPL-SCNC: 97 MEQ/L (ref 98–111)
CO2 SERPL-SCNC: 23 MEQ/L (ref 23–33)
CREAT SERPL-MCNC: 0.9 MG/DL (ref 0.4–1.2)
DEPRECATED RDW RBC AUTO: 46.3 FL (ref 35–45)
ERYTHROCYTE [DISTWIDTH] IN BLOOD BY AUTOMATED COUNT: 12.3 % (ref 11.5–14.5)
GFR SERPL CREATININE-BSD FRML MDRD: > 60 ML/MIN/1.73M2
GLUCOSE SERPL-MCNC: 87 MG/DL (ref 70–108)
HCT VFR BLD AUTO: 32.8 % (ref 42–52)
HGB BLD-MCNC: 10.9 GM/DL (ref 14–18)
MCH RBC QN AUTO: 33.7 PG (ref 26–33)
MCHC RBC AUTO-ENTMCNC: 33.2 GM/DL (ref 32.2–35.5)
MCV RBC AUTO: 101.5 FL (ref 80–94)
PLATELET # BLD AUTO: 201 THOU/MM3 (ref 130–400)
PMV BLD AUTO: 10.2 FL (ref 9.4–12.4)
POTASSIUM SERPL-SCNC: 4.2 MEQ/L (ref 3.5–5.2)
RBC # BLD AUTO: 3.23 MILL/MM3 (ref 4.7–6.1)
SODIUM SERPL-SCNC: 131 MEQ/L (ref 135–145)
WBC # BLD AUTO: 4.7 THOU/MM3 (ref 4.8–10.8)

## 2024-01-05 PROCEDURE — 84153 ASSAY OF PSA TOTAL: CPT

## 2024-01-05 PROCEDURE — 87902 NFCT AGT GNTYP ALYS HEP C: CPT

## 2024-01-05 PROCEDURE — 1200000000 HC SEMI PRIVATE

## 2024-01-05 PROCEDURE — 6370000000 HC RX 637 (ALT 250 FOR IP)

## 2024-01-05 PROCEDURE — 87522 HEPATITIS C REVRS TRNSCRPJ: CPT

## 2024-01-05 PROCEDURE — 36415 COLL VENOUS BLD VENIPUNCTURE: CPT

## 2024-01-05 PROCEDURE — 99233 SBSQ HOSP IP/OBS HIGH 50: CPT | Performed by: STUDENT IN AN ORGANIZED HEALTH CARE EDUCATION/TRAINING PROGRAM

## 2024-01-05 PROCEDURE — 6370000000 HC RX 637 (ALT 250 FOR IP): Performed by: STUDENT IN AN ORGANIZED HEALTH CARE EDUCATION/TRAINING PROGRAM

## 2024-01-05 PROCEDURE — 85027 COMPLETE CBC AUTOMATED: CPT

## 2024-01-05 PROCEDURE — 1200000003 HC TELEMETRY R&B

## 2024-01-05 PROCEDURE — 84154 ASSAY OF PSA FREE: CPT

## 2024-01-05 PROCEDURE — 2580000003 HC RX 258

## 2024-01-05 PROCEDURE — 6360000002 HC RX W HCPCS

## 2024-01-05 PROCEDURE — 97161 PT EVAL LOW COMPLEX 20 MIN: CPT

## 2024-01-05 PROCEDURE — 80048 BASIC METABOLIC PNL TOTAL CA: CPT

## 2024-01-05 RX ORDER — MECLIZINE HCL 25MG 25 MG/1
25 TABLET, CHEWABLE ORAL 3 TIMES DAILY PRN
Status: DISCONTINUED | OUTPATIENT
Start: 2024-01-05 | End: 2024-01-16 | Stop reason: HOSPADM

## 2024-01-05 RX ORDER — HYDROXYZINE HYDROCHLORIDE 25 MG/1
25 TABLET, FILM COATED ORAL ONCE
Status: COMPLETED | OUTPATIENT
Start: 2024-01-05 | End: 2024-01-05

## 2024-01-05 RX ORDER — POLYETHYLENE GLYCOL 3350 17 G/17G
17 POWDER, FOR SOLUTION ORAL 2 TIMES DAILY
Status: DISCONTINUED | OUTPATIENT
Start: 2024-01-05 | End: 2024-01-10

## 2024-01-05 RX ADMIN — PANTOPRAZOLE SODIUM 40 MG: 40 TABLET, DELAYED RELEASE ORAL at 06:15

## 2024-01-05 RX ADMIN — HYDROXYZINE HYDROCHLORIDE 25 MG: 25 TABLET, FILM COATED ORAL at 16:25

## 2024-01-05 RX ADMIN — DOXAZOSIN 2 MG: 2 TABLET ORAL at 19:47

## 2024-01-05 RX ADMIN — AMLODIPINE BESYLATE 5 MG: 5 TABLET ORAL at 09:01

## 2024-01-05 RX ADMIN — MECLIZINE HYDROCHLORIDE 25 MG: 25 TABLET, CHEWABLE ORAL at 16:25

## 2024-01-05 RX ADMIN — CEFTRIAXONE SODIUM 1000 MG: 1 INJECTION, POWDER, FOR SOLUTION INTRAMUSCULAR; INTRAVENOUS at 16:28

## 2024-01-05 RX ADMIN — AZITHROMYCIN MONOHYDRATE 500 MG: 500 INJECTION, POWDER, LYOPHILIZED, FOR SOLUTION INTRAVENOUS at 16:31

## 2024-01-05 RX ADMIN — SODIUM CHLORIDE: 9 INJECTION, SOLUTION INTRAVENOUS at 06:15

## 2024-01-05 RX ADMIN — SODIUM CHLORIDE: 9 INJECTION, SOLUTION INTRAVENOUS at 19:49

## 2024-01-05 ASSESSMENT — PAIN SCALES - GENERAL
PAINLEVEL_OUTOF10: 0

## 2024-01-05 NOTE — CARE COORDINATION
1/5/24, 12:41 PM EST    DISCHARGE ON GOING EVALUATION    Ronn [facility] day: 2  Location: -21/021-A Reason for admit: Syncope and collapse [R55]  Dehydration [E86.0]  Dizziness [R42]  Diarrhea, unspecified type [R19.7]     Procedure: n/a    Barriers to Discharge: Hospitalist following. PT/OT. Orthostatic HR and BP negative today (+ in ED). IVF. Telemetry. Na+ 131. WBC 4.7 (up from 3.6). IV Zithromax, IV Rocephin.     PCP: Camilo Guadalupe MD  Readmission Risk Score: 18.3%    Patient Goals/Plan/Treatment Preferences: Ronn spoke with  today regarding dc planning. He is staying in a motel and has the money to do so. He received a list of subsidized senior living options from  and was happy to look into options independently. Denies other needs at this time; CM following.         IMM Letter  IMM Letter given to Patient/Family/Significant other/Guardian/POA/by:: Jelena JONES CM  IMM Letter date given:: 01/05/24  IMM Letter time given:: 1210

## 2024-01-06 LAB
ANION GAP SERPL CALC-SCNC: 12 MEQ/L (ref 8–16)
BUN SERPL-MCNC: 9 MG/DL (ref 7–22)
CALCIUM SERPL-MCNC: 8.9 MG/DL (ref 8.5–10.5)
CHLORIDE SERPL-SCNC: 96 MEQ/L (ref 98–111)
CO2 SERPL-SCNC: 25 MEQ/L (ref 23–33)
CREAT SERPL-MCNC: 0.8 MG/DL (ref 0.4–1.2)
DEPRECATED RDW RBC AUTO: 45.9 FL (ref 35–45)
ERYTHROCYTE [DISTWIDTH] IN BLOOD BY AUTOMATED COUNT: 12.4 % (ref 11.5–14.5)
GFR SERPL CREATININE-BSD FRML MDRD: > 60 ML/MIN/1.73M2
GLUCOSE SERPL-MCNC: 97 MG/DL (ref 70–108)
HCT VFR BLD AUTO: 33.3 % (ref 42–52)
HGB BLD-MCNC: 11.2 GM/DL (ref 14–18)
MCH RBC QN AUTO: 33.7 PG (ref 26–33)
MCHC RBC AUTO-ENTMCNC: 33.6 GM/DL (ref 32.2–35.5)
MCV RBC AUTO: 100.3 FL (ref 80–94)
PLATELET # BLD AUTO: 212 THOU/MM3 (ref 130–400)
PMV BLD AUTO: 9.9 FL (ref 9.4–12.4)
POTASSIUM SERPL-SCNC: 4 MEQ/L (ref 3.5–5.2)
RBC # BLD AUTO: 3.32 MILL/MM3 (ref 4.7–6.1)
SODIUM SERPL-SCNC: 133 MEQ/L (ref 135–145)
WBC # BLD AUTO: 3.6 THOU/MM3 (ref 4.8–10.8)

## 2024-01-06 PROCEDURE — 6360000002 HC RX W HCPCS

## 2024-01-06 PROCEDURE — 1200000000 HC SEMI PRIVATE

## 2024-01-06 PROCEDURE — 6370000000 HC RX 637 (ALT 250 FOR IP)

## 2024-01-06 PROCEDURE — 6370000000 HC RX 637 (ALT 250 FOR IP): Performed by: STUDENT IN AN ORGANIZED HEALTH CARE EDUCATION/TRAINING PROGRAM

## 2024-01-06 PROCEDURE — 36415 COLL VENOUS BLD VENIPUNCTURE: CPT

## 2024-01-06 PROCEDURE — 2580000003 HC RX 258

## 2024-01-06 PROCEDURE — 99233 SBSQ HOSP IP/OBS HIGH 50: CPT | Performed by: STUDENT IN AN ORGANIZED HEALTH CARE EDUCATION/TRAINING PROGRAM

## 2024-01-06 PROCEDURE — 85027 COMPLETE CBC AUTOMATED: CPT

## 2024-01-06 PROCEDURE — 80048 BASIC METABOLIC PNL TOTAL CA: CPT

## 2024-01-06 RX ORDER — GUAIFENESIN/DEXTROMETHORPHAN 100-10MG/5
5 SYRUP ORAL EVERY 4 HOURS PRN
Status: DISCONTINUED | OUTPATIENT
Start: 2024-01-06 | End: 2024-01-16 | Stop reason: HOSPADM

## 2024-01-06 RX ADMIN — AZITHROMYCIN MONOHYDRATE 500 MG: 500 INJECTION, POWDER, LYOPHILIZED, FOR SOLUTION INTRAVENOUS at 16:39

## 2024-01-06 RX ADMIN — GUAIFENESIN AND DEXTROMETHORPHAN 5 ML: 100; 10 SYRUP ORAL at 19:39

## 2024-01-06 RX ADMIN — CEFTRIAXONE SODIUM 1000 MG: 1 INJECTION, POWDER, FOR SOLUTION INTRAMUSCULAR; INTRAVENOUS at 15:48

## 2024-01-06 RX ADMIN — Medication 3 MG: at 19:40

## 2024-01-06 RX ADMIN — DOXAZOSIN 2 MG: 2 TABLET ORAL at 19:39

## 2024-01-06 RX ADMIN — AMLODIPINE BESYLATE 5 MG: 5 TABLET ORAL at 08:41

## 2024-01-06 RX ADMIN — ACETAMINOPHEN 650 MG: 325 TABLET ORAL at 19:40

## 2024-01-06 RX ADMIN — PANTOPRAZOLE SODIUM 40 MG: 40 TABLET, DELAYED RELEASE ORAL at 06:31

## 2024-01-06 RX ADMIN — SODIUM CHLORIDE: 9 INJECTION, SOLUTION INTRAVENOUS at 08:43

## 2024-01-06 RX ADMIN — MECLIZINE HYDROCHLORIDE 25 MG: 25 TABLET, CHEWABLE ORAL at 12:40

## 2024-01-06 ASSESSMENT — PAIN SCALES - GENERAL
PAINLEVEL_OUTOF10: 0
PAINLEVEL_OUTOF10: 3
PAINLEVEL_OUTOF10: 3
PAINLEVEL_OUTOF10: 0

## 2024-01-06 ASSESSMENT — PAIN DESCRIPTION - ONSET: ONSET: ON-GOING

## 2024-01-06 ASSESSMENT — PAIN DESCRIPTION - ORIENTATION: ORIENTATION: MID

## 2024-01-06 ASSESSMENT — PAIN DESCRIPTION - PAIN TYPE: TYPE: ACUTE PAIN;CHRONIC PAIN

## 2024-01-06 ASSESSMENT — PAIN DESCRIPTION - DESCRIPTORS: DESCRIPTORS: ACHING

## 2024-01-06 ASSESSMENT — PAIN DESCRIPTION - FREQUENCY: FREQUENCY: INTERMITTENT

## 2024-01-06 ASSESSMENT — PAIN - FUNCTIONAL ASSESSMENT: PAIN_FUNCTIONAL_ASSESSMENT: ACTIVITIES ARE NOT PREVENTED

## 2024-01-06 ASSESSMENT — PAIN DESCRIPTION - LOCATION: LOCATION: GENERALIZED

## 2024-01-07 PROBLEM — J18.9 COMMUNITY ACQUIRED PNEUMONIA: Status: ACTIVE | Noted: 2024-01-07

## 2024-01-07 PROBLEM — H81.8X9 PERSISTENT POSTURAL-PERCEPTUAL DIZZINESS: Status: ACTIVE | Noted: 2024-01-03

## 2024-01-07 LAB
ANION GAP SERPL CALC-SCNC: 12 MEQ/L (ref 8–16)
BUN SERPL-MCNC: 9 MG/DL (ref 7–22)
CALCIUM SERPL-MCNC: 8.9 MG/DL (ref 8.5–10.5)
CHLORIDE SERPL-SCNC: 94 MEQ/L (ref 98–111)
CO2 SERPL-SCNC: 26 MEQ/L (ref 23–33)
CREAT SERPL-MCNC: 0.9 MG/DL (ref 0.4–1.2)
DEPRECATED RDW RBC AUTO: 45.1 FL (ref 35–45)
ERYTHROCYTE [DISTWIDTH] IN BLOOD BY AUTOMATED COUNT: 12.3 % (ref 11.5–14.5)
GFR SERPL CREATININE-BSD FRML MDRD: > 60 ML/MIN/1.73M2
GLUCOSE SERPL-MCNC: 88 MG/DL (ref 70–108)
HCT VFR BLD AUTO: 33.8 % (ref 42–52)
HCV LOAD REFLEX TO GENOTYPE: NORMAL
HGB BLD-MCNC: 11.4 GM/DL (ref 14–18)
MCH RBC QN AUTO: 33.7 PG (ref 26–33)
MCHC RBC AUTO-ENTMCNC: 33.7 GM/DL (ref 32.2–35.5)
MCV RBC AUTO: 100 FL (ref 80–94)
PLATELET # BLD AUTO: 203 THOU/MM3 (ref 130–400)
PMV BLD AUTO: 10 FL (ref 9.4–12.4)
POTASSIUM SERPL-SCNC: 4 MEQ/L (ref 3.5–5.2)
RBC # BLD AUTO: 3.38 MILL/MM3 (ref 4.7–6.1)
SODIUM SERPL-SCNC: 132 MEQ/L (ref 135–145)
WBC # BLD AUTO: 3.1 THOU/MM3 (ref 4.8–10.8)

## 2024-01-07 PROCEDURE — 36415 COLL VENOUS BLD VENIPUNCTURE: CPT

## 2024-01-07 PROCEDURE — 80048 BASIC METABOLIC PNL TOTAL CA: CPT

## 2024-01-07 PROCEDURE — 99233 SBSQ HOSP IP/OBS HIGH 50: CPT | Performed by: STUDENT IN AN ORGANIZED HEALTH CARE EDUCATION/TRAINING PROGRAM

## 2024-01-07 PROCEDURE — 1200000000 HC SEMI PRIVATE

## 2024-01-07 PROCEDURE — 85027 COMPLETE CBC AUTOMATED: CPT

## 2024-01-07 PROCEDURE — 6370000000 HC RX 637 (ALT 250 FOR IP)

## 2024-01-07 PROCEDURE — 2580000003 HC RX 258

## 2024-01-07 PROCEDURE — 6360000002 HC RX W HCPCS

## 2024-01-07 RX ADMIN — FLUTICASONE PROPIONATE 1 SPRAY: 50 SPRAY, METERED NASAL at 09:14

## 2024-01-07 RX ADMIN — PANTOPRAZOLE SODIUM 40 MG: 40 TABLET, DELAYED RELEASE ORAL at 06:46

## 2024-01-07 RX ADMIN — SODIUM CHLORIDE: 9 INJECTION, SOLUTION INTRAVENOUS at 02:26

## 2024-01-07 RX ADMIN — AMLODIPINE BESYLATE 5 MG: 5 TABLET ORAL at 09:15

## 2024-01-07 RX ADMIN — CEFTRIAXONE SODIUM 1000 MG: 1 INJECTION, POWDER, FOR SOLUTION INTRAMUSCULAR; INTRAVENOUS at 15:57

## 2024-01-07 RX ADMIN — ACETAMINOPHEN 650 MG: 325 TABLET ORAL at 15:50

## 2024-01-07 RX ADMIN — SODIUM CHLORIDE: 9 INJECTION, SOLUTION INTRAVENOUS at 16:51

## 2024-01-07 ASSESSMENT — PAIN SCALES - GENERAL
PAINLEVEL_OUTOF10: 0
PAINLEVEL_OUTOF10: 8
PAINLEVEL_OUTOF10: 0

## 2024-01-07 ASSESSMENT — PAIN - FUNCTIONAL ASSESSMENT: PAIN_FUNCTIONAL_ASSESSMENT: ACTIVITIES ARE NOT PREVENTED

## 2024-01-07 ASSESSMENT — PAIN DESCRIPTION - ORIENTATION: ORIENTATION: MID

## 2024-01-07 ASSESSMENT — PAIN DESCRIPTION - DESCRIPTORS: DESCRIPTORS: ACHING

## 2024-01-07 ASSESSMENT — PAIN DESCRIPTION - LOCATION: LOCATION: HEAD

## 2024-01-08 ENCOUNTER — APPOINTMENT (OUTPATIENT)
Dept: MRI IMAGING | Age: 69
DRG: 393 | End: 2024-01-08
Payer: MEDICARE

## 2024-01-08 ENCOUNTER — APPOINTMENT (OUTPATIENT)
Dept: GENERAL RADIOLOGY | Age: 69
DRG: 393 | End: 2024-01-08
Payer: MEDICARE

## 2024-01-08 LAB
ANION GAP SERPL CALC-SCNC: 11 MEQ/L (ref 8–16)
BACTERIA BLD AEROBE CULT: NORMAL
BACTERIA BLD AEROBE CULT: NORMAL
BUN SERPL-MCNC: 10 MG/DL (ref 7–22)
CALCIUM SERPL-MCNC: 9.3 MG/DL (ref 8.5–10.5)
CHLORIDE SERPL-SCNC: 96 MEQ/L (ref 98–111)
CO2 SERPL-SCNC: 23 MEQ/L (ref 23–33)
CREAT SERPL-MCNC: 0.9 MG/DL (ref 0.4–1.2)
DEPRECATED RDW RBC AUTO: 45.7 FL (ref 35–45)
ERYTHROCYTE [DISTWIDTH] IN BLOOD BY AUTOMATED COUNT: 12.2 % (ref 11.5–14.5)
GFR SERPL CREATININE-BSD FRML MDRD: > 60 ML/MIN/1.73M2
GLUCOSE SERPL-MCNC: 83 MG/DL (ref 70–108)
HCT VFR BLD AUTO: 37.6 % (ref 42–52)
HGB BLD-MCNC: 12.4 GM/DL (ref 14–18)
MCH RBC QN AUTO: 33.6 PG (ref 26–33)
MCHC RBC AUTO-ENTMCNC: 33 GM/DL (ref 32.2–35.5)
MCV RBC AUTO: 101.9 FL (ref 80–94)
PLATELET # BLD AUTO: 219 THOU/MM3 (ref 130–400)
PMV BLD AUTO: 10.1 FL (ref 9.4–12.4)
POTASSIUM SERPL-SCNC: 3.9 MEQ/L (ref 3.5–5.2)
RBC # BLD AUTO: 3.69 MILL/MM3 (ref 4.7–6.1)
SODIUM SERPL-SCNC: 130 MEQ/L (ref 135–145)
WBC # BLD AUTO: 3.5 THOU/MM3 (ref 4.8–10.8)

## 2024-01-08 PROCEDURE — 6360000002 HC RX W HCPCS

## 2024-01-08 PROCEDURE — 80048 BASIC METABOLIC PNL TOTAL CA: CPT

## 2024-01-08 PROCEDURE — 2580000003 HC RX 258

## 2024-01-08 PROCEDURE — 70553 MRI BRAIN STEM W/O & W/DYE: CPT

## 2024-01-08 PROCEDURE — 85027 COMPLETE CBC AUTOMATED: CPT

## 2024-01-08 PROCEDURE — 99233 SBSQ HOSP IP/OBS HIGH 50: CPT | Performed by: STUDENT IN AN ORGANIZED HEALTH CARE EDUCATION/TRAINING PROGRAM

## 2024-01-08 PROCEDURE — 6360000004 HC RX CONTRAST MEDICATION: Performed by: STUDENT IN AN ORGANIZED HEALTH CARE EDUCATION/TRAINING PROGRAM

## 2024-01-08 PROCEDURE — 92523 SPEECH SOUND LANG COMPREHEN: CPT

## 2024-01-08 PROCEDURE — 36415 COLL VENOUS BLD VENIPUNCTURE: CPT

## 2024-01-08 PROCEDURE — 6370000000 HC RX 637 (ALT 250 FOR IP)

## 2024-01-08 PROCEDURE — 1200000000 HC SEMI PRIVATE

## 2024-01-08 PROCEDURE — 74018 RADEX ABDOMEN 1 VIEW: CPT

## 2024-01-08 PROCEDURE — A9579 GAD-BASE MR CONTRAST NOS,1ML: HCPCS | Performed by: STUDENT IN AN ORGANIZED HEALTH CARE EDUCATION/TRAINING PROGRAM

## 2024-01-08 PROCEDURE — 97129 THER IVNTJ 1ST 15 MIN: CPT

## 2024-01-08 RX ORDER — AMLODIPINE BESYLATE 10 MG/1
10 TABLET ORAL DAILY
Status: DISCONTINUED | OUTPATIENT
Start: 2024-01-08 | End: 2024-01-16 | Stop reason: HOSPADM

## 2024-01-08 RX ADMIN — FLUTICASONE PROPIONATE 1 SPRAY: 50 SPRAY, METERED NASAL at 09:24

## 2024-01-08 RX ADMIN — DOXAZOSIN 2 MG: 2 TABLET ORAL at 20:32

## 2024-01-08 RX ADMIN — PANTOPRAZOLE SODIUM 40 MG: 40 TABLET, DELAYED RELEASE ORAL at 05:48

## 2024-01-08 RX ADMIN — Medication 3 MG: at 20:32

## 2024-01-08 RX ADMIN — GADOTERIDOL 15 ML: 279.3 INJECTION, SOLUTION INTRAVENOUS at 13:21

## 2024-01-08 RX ADMIN — POLYETHYLENE GLYCOL 3350 17 G: 17 POWDER, FOR SOLUTION ORAL at 15:53

## 2024-01-08 RX ADMIN — AMLODIPINE BESYLATE 10 MG: 10 TABLET ORAL at 09:24

## 2024-01-08 RX ADMIN — CEFTRIAXONE SODIUM 1000 MG: 1 INJECTION, POWDER, FOR SOLUTION INTRAMUSCULAR; INTRAVENOUS at 15:44

## 2024-01-08 RX ADMIN — SODIUM CHLORIDE, PRESERVATIVE FREE 10 ML: 5 INJECTION INTRAVENOUS at 20:32

## 2024-01-08 RX ADMIN — POLYETHYLENE GLYCOL 3350 17 G: 17 POWDER, FOR SOLUTION ORAL at 20:33

## 2024-01-08 RX ADMIN — ACETAMINOPHEN 650 MG: 325 TABLET ORAL at 15:40

## 2024-01-08 RX ADMIN — ANTACID TABLETS 500 MG: 500 TABLET, CHEWABLE ORAL at 05:51

## 2024-01-08 ASSESSMENT — PAIN SCALES - GENERAL
PAINLEVEL_OUTOF10: 0
PAINLEVEL_OUTOF10: 4
PAINLEVEL_OUTOF10: 8

## 2024-01-08 ASSESSMENT — PAIN DESCRIPTION - LOCATION
LOCATION: HEAD
LOCATION: HEAD

## 2024-01-08 ASSESSMENT — PAIN DESCRIPTION - DESCRIPTORS
DESCRIPTORS: ACHING;DISCOMFORT
DESCRIPTORS: DISCOMFORT;ACHING

## 2024-01-08 NOTE — CARE COORDINATION
1/8/24, 3:20 PM EST    DISCHARGE PLANNING EVALUATION    Spoke with patient regarding discharge plan.  He is making phone calls for apartments.  He has a few more to go, talked with a few.  Discussed SNF at discharge,. Post-acute (PAC) provider list was provided to patient. Patient was informed of their freedom to choose PAC provider. Discussed and offered to show the patient the relevant PAC Providers quality and resource use measures on Medicare Compare web site via computer based on patient's goals of care and treatment preferences. Questions regarding selection process were answered.  [facility] is only facility in network in [place] that allows smoking.  He will think about it, will mohamud again tomorrow.

## 2024-01-09 LAB
ANION GAP SERPL CALC-SCNC: 12 MEQ/L (ref 8–16)
BUN SERPL-MCNC: 10 MG/DL (ref 7–22)
CALCIUM SERPL-MCNC: 9.1 MG/DL (ref 8.5–10.5)
CHLORIDE SERPL-SCNC: 97 MEQ/L (ref 98–111)
CO2 SERPL-SCNC: 24 MEQ/L (ref 23–33)
CREAT SERPL-MCNC: 0.9 MG/DL (ref 0.4–1.2)
DEPRECATED RDW RBC AUTO: 45.7 FL (ref 35–45)
ERYTHROCYTE [DISTWIDTH] IN BLOOD BY AUTOMATED COUNT: 12.2 % (ref 11.5–14.5)
GFR SERPL CREATININE-BSD FRML MDRD: > 60 ML/MIN/1.73M2
GLUCOSE SERPL-MCNC: 86 MG/DL (ref 70–108)
HCT VFR BLD AUTO: 34.3 % (ref 42–52)
HGB BLD-MCNC: 11.6 GM/DL (ref 14–18)
MCH RBC QN AUTO: 33.9 PG (ref 26–33)
MCHC RBC AUTO-ENTMCNC: 33.8 GM/DL (ref 32.2–35.5)
MCV RBC AUTO: 100.3 FL (ref 80–94)
PLATELET # BLD AUTO: 234 THOU/MM3 (ref 130–400)
PMV BLD AUTO: 10.1 FL (ref 9.4–12.4)
POTASSIUM SERPL-SCNC: 3.9 MEQ/L (ref 3.5–5.2)
RBC # BLD AUTO: 3.42 MILL/MM3 (ref 4.7–6.1)
SODIUM SERPL-SCNC: 133 MEQ/L (ref 135–145)
WBC # BLD AUTO: 2.6 THOU/MM3 (ref 4.8–10.8)

## 2024-01-09 PROCEDURE — 6370000000 HC RX 637 (ALT 250 FOR IP)

## 2024-01-09 PROCEDURE — 97535 SELF CARE MNGMENT TRAINING: CPT

## 2024-01-09 PROCEDURE — 6370000000 HC RX 637 (ALT 250 FOR IP): Performed by: STUDENT IN AN ORGANIZED HEALTH CARE EDUCATION/TRAINING PROGRAM

## 2024-01-09 PROCEDURE — 94640 AIRWAY INHALATION TREATMENT: CPT

## 2024-01-09 PROCEDURE — 97530 THERAPEUTIC ACTIVITIES: CPT

## 2024-01-09 PROCEDURE — 80048 BASIC METABOLIC PNL TOTAL CA: CPT

## 2024-01-09 PROCEDURE — 36415 COLL VENOUS BLD VENIPUNCTURE: CPT

## 2024-01-09 PROCEDURE — 2580000003 HC RX 258

## 2024-01-09 PROCEDURE — 97166 OT EVAL MOD COMPLEX 45 MIN: CPT

## 2024-01-09 PROCEDURE — 85027 COMPLETE CBC AUTOMATED: CPT

## 2024-01-09 PROCEDURE — 99233 SBSQ HOSP IP/OBS HIGH 50: CPT | Performed by: STUDENT IN AN ORGANIZED HEALTH CARE EDUCATION/TRAINING PROGRAM

## 2024-01-09 PROCEDURE — 1200000000 HC SEMI PRIVATE

## 2024-01-09 RX ORDER — IPRATROPIUM BROMIDE AND ALBUTEROL SULFATE 2.5; .5 MG/3ML; MG/3ML
1 SOLUTION RESPIRATORY (INHALATION)
Status: DISCONTINUED | OUTPATIENT
Start: 2024-01-09 | End: 2024-01-10

## 2024-01-09 RX ORDER — POLYETHYLENE GLYCOL 3350 17 G/17G
17 POWDER, FOR SOLUTION ORAL DAILY
Qty: 510 G | Refills: 0 | Status: CANCELLED | OUTPATIENT
Start: 2024-01-09 | End: 2024-02-08

## 2024-01-09 RX ORDER — ALBUTEROL SULFATE 2.5 MG/3ML
2.5 SOLUTION RESPIRATORY (INHALATION)
Status: DISCONTINUED | OUTPATIENT
Start: 2024-01-09 | End: 2024-01-16 | Stop reason: HOSPADM

## 2024-01-09 RX ORDER — ALBUTEROL SULFATE 2.5 MG/3ML
2.5 SOLUTION RESPIRATORY (INHALATION)
Status: DISCONTINUED | OUTPATIENT
Start: 2024-01-09 | End: 2024-01-09

## 2024-01-09 RX ADMIN — SODIUM CHLORIDE: 9 INJECTION, SOLUTION INTRAVENOUS at 06:12

## 2024-01-09 RX ADMIN — PANTOPRAZOLE SODIUM 40 MG: 40 TABLET, DELAYED RELEASE ORAL at 06:11

## 2024-01-09 RX ADMIN — POLYETHYLENE GLYCOL 3350 17 G: 17 POWDER, FOR SOLUTION ORAL at 08:54

## 2024-01-09 RX ADMIN — SODIUM CHLORIDE, PRESERVATIVE FREE 10 ML: 5 INJECTION INTRAVENOUS at 21:34

## 2024-01-09 RX ADMIN — IPRATROPIUM BROMIDE AND ALBUTEROL SULFATE 1 DOSE: .5; 3 SOLUTION RESPIRATORY (INHALATION) at 15:31

## 2024-01-09 RX ADMIN — MECLIZINE HYDROCHLORIDE 25 MG: 25 TABLET, CHEWABLE ORAL at 08:54

## 2024-01-09 RX ADMIN — POLYETHYLENE GLYCOL 3350 17 G: 17 POWDER, FOR SOLUTION ORAL at 21:34

## 2024-01-09 RX ADMIN — Medication 3 MG: at 21:34

## 2024-01-09 RX ADMIN — AMLODIPINE BESYLATE 10 MG: 10 TABLET ORAL at 08:54

## 2024-01-09 RX ADMIN — DOXAZOSIN 2 MG: 2 TABLET ORAL at 21:34

## 2024-01-09 RX ADMIN — ACETAMINOPHEN 650 MG: 325 TABLET ORAL at 08:54

## 2024-01-09 ASSESSMENT — PAIN DESCRIPTION - ORIENTATION: ORIENTATION: MID

## 2024-01-09 ASSESSMENT — PAIN DESCRIPTION - LOCATION: LOCATION: HEAD

## 2024-01-09 ASSESSMENT — PAIN - FUNCTIONAL ASSESSMENT: PAIN_FUNCTIONAL_ASSESSMENT: ACTIVITIES ARE NOT PREVENTED

## 2024-01-09 ASSESSMENT — PAIN DESCRIPTION - PAIN TYPE: TYPE: ACUTE PAIN

## 2024-01-09 ASSESSMENT — PAIN SCALES - GENERAL
PAINLEVEL_OUTOF10: 0
PAINLEVEL_OUTOF10: 8
PAINLEVEL_OUTOF10: 8

## 2024-01-09 ASSESSMENT — PAIN DESCRIPTION - DESCRIPTORS: DESCRIPTORS: ACHING

## 2024-01-09 NOTE — CARE COORDINATION
1/9/24, 11:20 AM EST    DISCHARGE PLANNING EVALUATION    Spoke with patient regarding discharge plan, he is agreeable to SNF, preference is [facility].      Spoke with Josep from [facility], referral made.  Faxed clinicals. Advised if accepted to start precert.

## 2024-01-10 LAB
ANION GAP SERPL CALC-SCNC: 10 MEQ/L (ref 8–16)
BUN SERPL-MCNC: 10 MG/DL (ref 7–22)
CALCIUM SERPL-MCNC: 9.4 MG/DL (ref 8.5–10.5)
CHLORIDE SERPL-SCNC: 97 MEQ/L (ref 98–111)
CO2 SERPL-SCNC: 26 MEQ/L (ref 23–33)
CREAT SERPL-MCNC: 1 MG/DL (ref 0.4–1.2)
DEPRECATED RDW RBC AUTO: 47 FL (ref 35–45)
ERYTHROCYTE [DISTWIDTH] IN BLOOD BY AUTOMATED COUNT: 12.5 % (ref 11.5–14.5)
GFR SERPL CREATININE-BSD FRML MDRD: > 60 ML/MIN/1.73M2
GLUCOSE SERPL-MCNC: 83 MG/DL (ref 70–108)
HCT VFR BLD AUTO: 34.4 % (ref 42–52)
HGB BLD-MCNC: 11.6 GM/DL (ref 14–18)
MCH RBC QN AUTO: 33.9 PG (ref 26–33)
MCHC RBC AUTO-ENTMCNC: 33.7 GM/DL (ref 32.2–35.5)
MCV RBC AUTO: 100.6 FL (ref 80–94)
PLATELET # BLD AUTO: 227 THOU/MM3 (ref 130–400)
PMV BLD AUTO: 10.2 FL (ref 9.4–12.4)
POTASSIUM SERPL-SCNC: 4.2 MEQ/L (ref 3.5–5.2)
RBC # BLD AUTO: 3.42 MILL/MM3 (ref 4.7–6.1)
SODIUM SERPL-SCNC: 133 MEQ/L (ref 135–145)
WBC # BLD AUTO: 3 THOU/MM3 (ref 4.8–10.8)

## 2024-01-10 PROCEDURE — 99232 SBSQ HOSP IP/OBS MODERATE 35: CPT | Performed by: INTERNAL MEDICINE

## 2024-01-10 PROCEDURE — 1200000000 HC SEMI PRIVATE

## 2024-01-10 PROCEDURE — 80048 BASIC METABOLIC PNL TOTAL CA: CPT

## 2024-01-10 PROCEDURE — 85027 COMPLETE CBC AUTOMATED: CPT

## 2024-01-10 PROCEDURE — 6370000000 HC RX 637 (ALT 250 FOR IP)

## 2024-01-10 PROCEDURE — 36415 COLL VENOUS BLD VENIPUNCTURE: CPT

## 2024-01-10 PROCEDURE — 2580000003 HC RX 258

## 2024-01-10 RX ORDER — ALBUTEROL SULFATE 90 UG/1
2 AEROSOL, METERED RESPIRATORY (INHALATION) EVERY 4 HOURS PRN
Status: DISCONTINUED | OUTPATIENT
Start: 2024-01-10 | End: 2024-01-16 | Stop reason: HOSPADM

## 2024-01-10 RX ORDER — SENNOSIDES A AND B 8.6 MG/1
2 TABLET, FILM COATED ORAL NIGHTLY
Status: DISCONTINUED | OUTPATIENT
Start: 2024-01-10 | End: 2024-01-16 | Stop reason: HOSPADM

## 2024-01-10 RX ORDER — SENNOSIDES A AND B 8.6 MG/1
1 TABLET, FILM COATED ORAL ONCE
Status: COMPLETED | OUTPATIENT
Start: 2024-01-10 | End: 2024-01-10

## 2024-01-10 RX ADMIN — POLYETHYLENE GLYCOL 3350 17 G: 17 POWDER, FOR SOLUTION ORAL at 08:46

## 2024-01-10 RX ADMIN — SODIUM CHLORIDE, PRESERVATIVE FREE 10 ML: 5 INJECTION INTRAVENOUS at 21:41

## 2024-01-10 RX ADMIN — DOXAZOSIN 2 MG: 2 TABLET ORAL at 21:41

## 2024-01-10 RX ADMIN — SENNOSIDES 8.6 MG: 8.6 TABLET ORAL at 14:28

## 2024-01-10 RX ADMIN — Medication 3 MG: at 21:41

## 2024-01-10 RX ADMIN — SODIUM CHLORIDE, PRESERVATIVE FREE 10 ML: 5 INJECTION INTRAVENOUS at 08:36

## 2024-01-10 RX ADMIN — AMLODIPINE BESYLATE 10 MG: 10 TABLET ORAL at 08:46

## 2024-01-10 RX ADMIN — ACETAMINOPHEN 650 MG: 325 TABLET ORAL at 21:41

## 2024-01-10 RX ADMIN — PANTOPRAZOLE SODIUM 40 MG: 40 TABLET, DELAYED RELEASE ORAL at 06:08

## 2024-01-10 RX ADMIN — FLUTICASONE PROPIONATE 1 SPRAY: 50 SPRAY, METERED NASAL at 08:47

## 2024-01-10 RX ADMIN — SENNOSIDES 17.2 MG: 8.6 TABLET, FILM COATED ORAL at 21:41

## 2024-01-10 ASSESSMENT — PAIN DESCRIPTION - LOCATION: LOCATION: HEAD

## 2024-01-10 ASSESSMENT — PAIN SCALES - GENERAL: PAINLEVEL_OUTOF10: 6

## 2024-01-10 ASSESSMENT — PAIN DESCRIPTION - DESCRIPTORS: DESCRIPTORS: ACHING

## 2024-01-10 NOTE — RT PROTOCOL NOTE
RT Inhaler-Nebulizer Bronchodilator Protocol Note    There is a bronchodilator order in the chart from a provider indicating to follow the RT Bronchodilator Protocol and there is an “Initiate RT Inhaler-Nebulizer Bronchodilator Protocol” order as well (see protocol at bottom of note).    CXR Findings:  No results found.    The findings from the last RT Protocol Assessment were as follows:   History Pulmonary Disease: Smoker 15 pack years or more  Respiratory Pattern: Regular pattern and RR 12-20 bpm  Breath Sounds: Slightly diminished and/or crackles  Cough: Strong, spontaneous, non-productive  Indication for Bronchodilator Therapy: Decreased or absent breath sounds  Bronchodilator Assessment Score: 3    Aerosolized bronchodilator medication orders have been revised according to the RT Inhaler-Nebulizer Bronchodilator Protocol below.    Respiratory Therapist to perform RT Therapy Protocol Assessment initially then follow the protocol.  Repeat RT Therapy Protocol Assessment PRN for score 0-3 or on second treatment, BID, and PRN for scores above 3.    No Indications - adjust the frequency to every 6 hours PRN wheezing or bronchospasm, if no treatments needed after 48 hours then discontinue using Per Protocol order mode.     If indication present, adjust the RT bronchodilator orders based on the Bronchodilator Assessment Score as indicated below.  Use Inhaler orders unless patient has one or more of the following: on home nebulizer, not able to hold breath for 10 seconds, is not alert and oriented, cannot activate and use MDI correctly, or respiratory rate 25 breaths per minute or more, then use the equivalent nebulizer order(s) with same Frequency and PRN reasons based on the score.  If a patient is on this medication at home then do not decrease Frequency below that used at home.    0-3 - enter or revise RT bronchodilator order(s) to equivalent RT Bronchodilator order with Frequency of every 4 hours PRN for wheezing 
wheezing or increased work of breathing using Per Protocol order mode.        4-6 - enter or revise RT Bronchodilator order(s) to two equivalent RT bronchodilator orders with one order with BID Frequency and one order with Frequency of every 4 hours PRN wheezing or increased work of breathing using Per Protocol order mode.        7-10 - enter or revise RT Bronchodilator order(s) to two equivalent RT bronchodilator orders with one order with TID Frequency and one order with Frequency of every 4 hours PRN wheezing or increased work of breathing using Per Protocol order mode.       11-13 - enter or revise RT Bronchodilator order(s) to one equivalent RT bronchodilator order with QID Frequency and an Albuterol order with Frequency of every 4 hours PRN wheezing or increased work of breathing using Per Protocol order mode.      Greater than 13 - enter or revise RT Bronchodilator order(s) to one equivalent RT bronchodilator order with every 4 hours Frequency and an Albuterol order with Frequency of every 2 hours PRN wheezing or increased work of breathing using Per Protocol order mode.     RT to enter RT Home Evaluation for COPD & MDI Assessment order using Per Protocol order mode.    Electronically signed by Kandis Tristan RCP on 1/9/2024 at 3:27 PM

## 2024-01-10 NOTE — CARE COORDINATION
1/10/24, 10:14 AM EST    DISCHARGE PLANNING EVALUATION    Spoke with Josep from [facility], he requested PT/OT notes to be resent, then will have answer on acceptance.     11:36 AM  Spoke with Josep from [facility], they will accept patient. Will start precert today 1/10.

## 2024-01-11 LAB
ANION GAP SERPL CALC-SCNC: 7 MEQ/L (ref 8–16)
BUN SERPL-MCNC: 10 MG/DL (ref 7–22)
CALCIUM SERPL-MCNC: 8.9 MG/DL (ref 8.5–10.5)
CHLORIDE SERPL-SCNC: 95 MEQ/L (ref 98–111)
CO2 SERPL-SCNC: 27 MEQ/L (ref 23–33)
CREAT SERPL-MCNC: 0.9 MG/DL (ref 0.4–1.2)
DEPRECATED RDW RBC AUTO: 47.2 FL (ref 35–45)
ERYTHROCYTE [DISTWIDTH] IN BLOOD BY AUTOMATED COUNT: 12.6 % (ref 11.5–14.5)
GFR SERPL CREATININE-BSD FRML MDRD: > 60 ML/MIN/1.73M2
GLUCOSE SERPL-MCNC: 81 MG/DL (ref 70–108)
HCT VFR BLD AUTO: 34.5 % (ref 42–52)
HGB BLD-MCNC: 11.5 GM/DL (ref 14–18)
MCH RBC QN AUTO: 33.7 PG (ref 26–33)
MCHC RBC AUTO-ENTMCNC: 33.3 GM/DL (ref 32.2–35.5)
MCV RBC AUTO: 101.2 FL (ref 80–94)
PLATELET # BLD AUTO: 226 THOU/MM3 (ref 130–400)
PMV BLD AUTO: 9.9 FL (ref 9.4–12.4)
POTASSIUM SERPL-SCNC: 4.1 MEQ/L (ref 3.5–5.2)
RBC # BLD AUTO: 3.41 MILL/MM3 (ref 4.7–6.1)
SODIUM SERPL-SCNC: 129 MEQ/L (ref 135–145)
WBC # BLD AUTO: 3.2 THOU/MM3 (ref 4.8–10.8)

## 2024-01-11 PROCEDURE — 36415 COLL VENOUS BLD VENIPUNCTURE: CPT

## 2024-01-11 PROCEDURE — 6370000000 HC RX 637 (ALT 250 FOR IP)

## 2024-01-11 PROCEDURE — 1200000000 HC SEMI PRIVATE

## 2024-01-11 PROCEDURE — 6370000000 HC RX 637 (ALT 250 FOR IP): Performed by: PHYSICIAN ASSISTANT

## 2024-01-11 PROCEDURE — 80048 BASIC METABOLIC PNL TOTAL CA: CPT

## 2024-01-11 PROCEDURE — 85027 COMPLETE CBC AUTOMATED: CPT

## 2024-01-11 PROCEDURE — 2580000003 HC RX 258

## 2024-01-11 PROCEDURE — 6370000000 HC RX 637 (ALT 250 FOR IP): Performed by: STUDENT IN AN ORGANIZED HEALTH CARE EDUCATION/TRAINING PROGRAM

## 2024-01-11 PROCEDURE — 99232 SBSQ HOSP IP/OBS MODERATE 35: CPT | Performed by: INTERNAL MEDICINE

## 2024-01-11 PROCEDURE — 6360000002 HC RX W HCPCS

## 2024-01-11 RX ORDER — BENZOCAINE/MENTHOL 6 MG-10 MG
LOZENGE MUCOUS MEMBRANE 2 TIMES DAILY
Status: DISCONTINUED | OUTPATIENT
Start: 2024-01-11 | End: 2024-01-16 | Stop reason: HOSPADM

## 2024-01-11 RX ADMIN — HYDROCORTISONE: 1 CREAM TOPICAL at 22:28

## 2024-01-11 RX ADMIN — ACETAMINOPHEN 650 MG: 325 TABLET ORAL at 20:45

## 2024-01-11 RX ADMIN — ANTACID TABLETS 500 MG: 500 TABLET, CHEWABLE ORAL at 08:22

## 2024-01-11 RX ADMIN — SENNOSIDES 17.2 MG: 8.6 TABLET, FILM COATED ORAL at 20:45

## 2024-01-11 RX ADMIN — SODIUM CHLORIDE, PRESERVATIVE FREE 10 ML: 5 INJECTION INTRAVENOUS at 20:45

## 2024-01-11 RX ADMIN — PANTOPRAZOLE SODIUM 40 MG: 40 TABLET, DELAYED RELEASE ORAL at 05:18

## 2024-01-11 RX ADMIN — DOXAZOSIN 2 MG: 2 TABLET ORAL at 20:45

## 2024-01-11 RX ADMIN — AMLODIPINE BESYLATE 10 MG: 10 TABLET ORAL at 08:21

## 2024-01-11 RX ADMIN — Medication 3 MG: at 20:45

## 2024-01-11 RX ADMIN — SODIUM CHLORIDE, PRESERVATIVE FREE 10 ML: 5 INJECTION INTRAVENOUS at 08:23

## 2024-01-11 RX ADMIN — Medication: at 08:22

## 2024-01-11 ASSESSMENT — PAIN SCALES - GENERAL
PAINLEVEL_OUTOF10: 8
PAINLEVEL_OUTOF10: 3

## 2024-01-11 ASSESSMENT — PAIN DESCRIPTION - LOCATION
LOCATION: GENERALIZED
LOCATION: ABDOMEN

## 2024-01-11 NOTE — CARE COORDINATION
1/11/24, 2:31 PM EST    DISCHARGE ON GOING EVALUATION    [facility] day: 8  Location: -21/021-A Reason for admit: Syncope and collapse [R55]  Dehydration [E86.0]  Dizziness [R42]  Diarrhea, unspecified type [R19.7]   Procedure: No.  Barriers to Discharge: Awaiting pre-cert for [facility]. Pt no longer orthostatic. Afebrile. Room air. PT/OT.   PCP: Camilo Guadalupe MD  Readmission Risk Score: 16.9%  Patient Goals/Plan/Treatment Preferences: Pt has been staying in a motel and apparently is able to do this. However, he could benefit from therapy stay prior to discharge. SW following for placement

## 2024-01-12 LAB
ANION GAP SERPL CALC-SCNC: 11 MEQ/L (ref 8–16)
BUN SERPL-MCNC: 14 MG/DL (ref 7–22)
CALCIUM SERPL-MCNC: 8.9 MG/DL (ref 8.5–10.5)
CHLORIDE SERPL-SCNC: 98 MEQ/L (ref 98–111)
CO2 SERPL-SCNC: 24 MEQ/L (ref 23–33)
CREAT SERPL-MCNC: 0.9 MG/DL (ref 0.4–1.2)
DEPRECATED RDW RBC AUTO: 47.2 FL (ref 35–45)
ERYTHROCYTE [DISTWIDTH] IN BLOOD BY AUTOMATED COUNT: 12.4 % (ref 11.5–14.5)
GFR SERPL CREATININE-BSD FRML MDRD: > 60 ML/MIN/1.73M2
GLUCOSE SERPL-MCNC: 81 MG/DL (ref 70–108)
HCT VFR BLD AUTO: 33 % (ref 42–52)
HCV GENTYP SERPL NAA+PROBE: NORMAL
HGB BLD-MCNC: 10.8 GM/DL (ref 14–18)
MCH RBC QN AUTO: 33.6 PG (ref 26–33)
MCHC RBC AUTO-ENTMCNC: 32.7 GM/DL (ref 32.2–35.5)
MCV RBC AUTO: 102.8 FL (ref 80–94)
PLATELET # BLD AUTO: 216 THOU/MM3 (ref 130–400)
PMV BLD AUTO: 10.1 FL (ref 9.4–12.4)
POTASSIUM SERPL-SCNC: 4 MEQ/L (ref 3.5–5.2)
RBC # BLD AUTO: 3.21 MILL/MM3 (ref 4.7–6.1)
SODIUM SERPL-SCNC: 133 MEQ/L (ref 135–145)
WBC # BLD AUTO: 3.4 THOU/MM3 (ref 4.8–10.8)

## 2024-01-12 PROCEDURE — 2580000003 HC RX 258

## 2024-01-12 PROCEDURE — 1200000000 HC SEMI PRIVATE

## 2024-01-12 PROCEDURE — 85027 COMPLETE CBC AUTOMATED: CPT

## 2024-01-12 PROCEDURE — 6370000000 HC RX 637 (ALT 250 FOR IP)

## 2024-01-12 PROCEDURE — 80048 BASIC METABOLIC PNL TOTAL CA: CPT

## 2024-01-12 PROCEDURE — 36415 COLL VENOUS BLD VENIPUNCTURE: CPT

## 2024-01-12 PROCEDURE — 99232 SBSQ HOSP IP/OBS MODERATE 35: CPT | Performed by: INTERNAL MEDICINE

## 2024-01-12 RX ADMIN — PANTOPRAZOLE SODIUM 40 MG: 40 TABLET, DELAYED RELEASE ORAL at 05:15

## 2024-01-12 RX ADMIN — SODIUM CHLORIDE, PRESERVATIVE FREE 10 ML: 5 INJECTION INTRAVENOUS at 21:19

## 2024-01-12 RX ADMIN — ACETAMINOPHEN 650 MG: 325 TABLET ORAL at 21:19

## 2024-01-12 RX ADMIN — ACETAMINOPHEN 650 MG: 325 TABLET ORAL at 09:14

## 2024-01-12 RX ADMIN — SENNOSIDES 17.2 MG: 8.6 TABLET, FILM COATED ORAL at 21:19

## 2024-01-12 RX ADMIN — AMLODIPINE BESYLATE 10 MG: 10 TABLET ORAL at 09:14

## 2024-01-12 RX ADMIN — Medication 3 MG: at 21:19

## 2024-01-12 RX ADMIN — DOXAZOSIN 2 MG: 2 TABLET ORAL at 21:20

## 2024-01-12 RX ADMIN — ANTACID TABLETS 500 MG: 500 TABLET, CHEWABLE ORAL at 09:14

## 2024-01-12 RX ADMIN — SODIUM CHLORIDE, PRESERVATIVE FREE 10 ML: 5 INJECTION INTRAVENOUS at 09:14

## 2024-01-12 RX ADMIN — HYDROCORTISONE: 1 CREAM TOPICAL at 21:20

## 2024-01-12 ASSESSMENT — PAIN DESCRIPTION - ORIENTATION: ORIENTATION: MID

## 2024-01-12 ASSESSMENT — PAIN SCALES - GENERAL
PAINLEVEL_OUTOF10: 4
PAINLEVEL_OUTOF10: 4

## 2024-01-12 ASSESSMENT — PAIN DESCRIPTION - LOCATION
LOCATION: HEAD
LOCATION: GENERALIZED

## 2024-01-12 ASSESSMENT — PAIN DESCRIPTION - DESCRIPTORS: DESCRIPTORS: ACHING

## 2024-01-12 NOTE — CARE COORDINATION
1/12/24, 4:07 PM EST    DISCHARGE PLANNING EVALUATION    Spoke with Josep from [facility], no word from precert yet.  Josep will call nurses station if precert is approved over the weekend. Discharge packet with instruction on chart. Patient is an ambulette transport.

## 2024-01-12 NOTE — CARE COORDINATION
1/12/24, 2:18 PM EST    DISCHARGE ON GOING EVALUATION    [facility] day: 9  Location: -21/021-A Reason for admit: Syncope and collapse [R55]  Dehydration [E86.0]  Dizziness [R42]  Diarrhea, unspecified type [R19.7]   Procedure: No  Barriers to Discharge: Awaiting pre-cert for [facility]. SW and PT/OT cont to follow.  PCP: Camilo Guadalupe MD  Readmission Risk Score: 17.2%  Patient Goals/Plan/Treatment Preferences: Pt has been staying in a motel and apparently is able to do this. However, he could benefit from therapy stay prior to discharge. SW following for placement

## 2024-01-13 ENCOUNTER — APPOINTMENT (OUTPATIENT)
Dept: GENERAL RADIOLOGY | Age: 69
DRG: 393 | End: 2024-01-13
Payer: MEDICARE

## 2024-01-13 LAB
ANION GAP SERPL CALC-SCNC: 10 MEQ/L (ref 8–16)
BUN SERPL-MCNC: 13 MG/DL (ref 7–22)
CALCIUM SERPL-MCNC: 9.1 MG/DL (ref 8.5–10.5)
CHLORIDE SERPL-SCNC: 97 MEQ/L (ref 98–111)
CO2 SERPL-SCNC: 23 MEQ/L (ref 23–33)
CREAT SERPL-MCNC: 0.9 MG/DL (ref 0.4–1.2)
DEPRECATED RDW RBC AUTO: 46.5 FL (ref 35–45)
ERYTHROCYTE [DISTWIDTH] IN BLOOD BY AUTOMATED COUNT: 12.3 % (ref 11.5–14.5)
GFR SERPL CREATININE-BSD FRML MDRD: > 60 ML/MIN/1.73M2
GLUCOSE SERPL-MCNC: 82 MG/DL (ref 70–108)
HCT VFR BLD AUTO: 35.9 % (ref 42–52)
HGB BLD-MCNC: 11.9 GM/DL (ref 14–18)
MCH RBC QN AUTO: 33.3 PG (ref 26–33)
MCHC RBC AUTO-ENTMCNC: 33.1 GM/DL (ref 32.2–35.5)
MCV RBC AUTO: 100.6 FL (ref 80–94)
PLATELET # BLD AUTO: 235 THOU/MM3 (ref 130–400)
PMV BLD AUTO: 9.9 FL (ref 9.4–12.4)
POTASSIUM SERPL-SCNC: 4.1 MEQ/L (ref 3.5–5.2)
PROSTATE SPECIFIC ANTIGEN FREE: NORMAL
RBC # BLD AUTO: 3.57 MILL/MM3 (ref 4.7–6.1)
SODIUM SERPL-SCNC: 130 MEQ/L (ref 135–145)
WBC # BLD AUTO: 2.7 THOU/MM3 (ref 4.8–10.8)

## 2024-01-13 PROCEDURE — 6370000000 HC RX 637 (ALT 250 FOR IP): Performed by: INTERNAL MEDICINE

## 2024-01-13 PROCEDURE — 80048 BASIC METABOLIC PNL TOTAL CA: CPT

## 2024-01-13 PROCEDURE — 6370000000 HC RX 637 (ALT 250 FOR IP)

## 2024-01-13 PROCEDURE — 36415 COLL VENOUS BLD VENIPUNCTURE: CPT

## 2024-01-13 PROCEDURE — 99232 SBSQ HOSP IP/OBS MODERATE 35: CPT | Performed by: INTERNAL MEDICINE

## 2024-01-13 PROCEDURE — 1200000000 HC SEMI PRIVATE

## 2024-01-13 PROCEDURE — 6370000000 HC RX 637 (ALT 250 FOR IP): Performed by: STUDENT IN AN ORGANIZED HEALTH CARE EDUCATION/TRAINING PROGRAM

## 2024-01-13 PROCEDURE — 74018 RADEX ABDOMEN 1 VIEW: CPT

## 2024-01-13 PROCEDURE — 2580000003 HC RX 258

## 2024-01-13 PROCEDURE — 85027 COMPLETE CBC AUTOMATED: CPT

## 2024-01-13 RX ORDER — LACTULOSE 10 G/15ML
20 SOLUTION ORAL
Status: DISPENSED | OUTPATIENT
Start: 2024-01-13 | End: 2024-01-14

## 2024-01-13 RX ORDER — BISACODYL 5 MG/1
10 TABLET, DELAYED RELEASE ORAL ONCE
Status: COMPLETED | OUTPATIENT
Start: 2024-01-13 | End: 2024-01-13

## 2024-01-13 RX ADMIN — SENNOSIDES 17.2 MG: 8.6 TABLET, FILM COATED ORAL at 21:19

## 2024-01-13 RX ADMIN — AMLODIPINE BESYLATE 10 MG: 10 TABLET ORAL at 08:37

## 2024-01-13 RX ADMIN — SODIUM CHLORIDE, PRESERVATIVE FREE 10 ML: 5 INJECTION INTRAVENOUS at 21:19

## 2024-01-13 RX ADMIN — BISACODYL 10 MG: 5 TABLET, COATED ORAL at 13:39

## 2024-01-13 RX ADMIN — HYDROCORTISONE: 1 CREAM TOPICAL at 21:23

## 2024-01-13 RX ADMIN — Medication 3 MG: at 21:23

## 2024-01-13 RX ADMIN — SODIUM CHLORIDE, PRESERVATIVE FREE 10 ML: 5 INJECTION INTRAVENOUS at 08:38

## 2024-01-13 RX ADMIN — LACTULOSE 20 G: 20 SOLUTION ORAL at 21:19

## 2024-01-13 RX ADMIN — DOXAZOSIN 2 MG: 2 TABLET ORAL at 21:19

## 2024-01-13 RX ADMIN — HYDROCORTISONE: 1 CREAM TOPICAL at 08:37

## 2024-01-13 RX ADMIN — PANTOPRAZOLE SODIUM 40 MG: 40 TABLET, DELAYED RELEASE ORAL at 05:05

## 2024-01-13 RX ADMIN — MECLIZINE HYDROCHLORIDE 25 MG: 25 TABLET, CHEWABLE ORAL at 10:39

## 2024-01-13 ASSESSMENT — PAIN SCALES - GENERAL
PAINLEVEL_OUTOF10: 0
PAINLEVEL_OUTOF10: 0

## 2024-01-14 PROCEDURE — 6370000000 HC RX 637 (ALT 250 FOR IP)

## 2024-01-14 PROCEDURE — 2580000003 HC RX 258

## 2024-01-14 PROCEDURE — 6360000002 HC RX W HCPCS

## 2024-01-14 PROCEDURE — 1200000000 HC SEMI PRIVATE

## 2024-01-14 PROCEDURE — 99231 SBSQ HOSP IP/OBS SF/LOW 25: CPT | Performed by: INTERNAL MEDICINE

## 2024-01-14 RX ORDER — ENEMA 19; 7 G/133ML; G/133ML
1 ENEMA RECTAL ONCE
Status: COMPLETED | OUTPATIENT
Start: 2024-01-14 | End: 2024-01-14

## 2024-01-14 RX ADMIN — PANTOPRAZOLE SODIUM 40 MG: 40 TABLET, DELAYED RELEASE ORAL at 05:19

## 2024-01-14 RX ADMIN — HYDROCORTISONE: 1 CREAM TOPICAL at 20:33

## 2024-01-14 RX ADMIN — POLYETHYLENE GLYCOL 3350 17 G: 17 POWDER, FOR SOLUTION ORAL at 09:05

## 2024-01-14 RX ADMIN — FLUTICASONE PROPIONATE 1 SPRAY: 50 SPRAY, METERED NASAL at 09:02

## 2024-01-14 RX ADMIN — ACETAMINOPHEN 650 MG: 325 TABLET ORAL at 20:31

## 2024-01-14 RX ADMIN — DOXAZOSIN 2 MG: 2 TABLET ORAL at 20:31

## 2024-01-14 RX ADMIN — AMLODIPINE BESYLATE 10 MG: 10 TABLET ORAL at 09:00

## 2024-01-14 RX ADMIN — SODIUM CHLORIDE, PRESERVATIVE FREE 10 ML: 5 INJECTION INTRAVENOUS at 20:31

## 2024-01-14 RX ADMIN — SODIUM PHOSPHATE 1 ENEMA: 7; 19 ENEMA RECTAL at 13:00

## 2024-01-14 RX ADMIN — SODIUM CHLORIDE, PRESERVATIVE FREE 10 ML: 5 INJECTION INTRAVENOUS at 09:02

## 2024-01-14 RX ADMIN — ENOXAPARIN SODIUM 40 MG: 100 INJECTION SUBCUTANEOUS at 09:01

## 2024-01-14 RX ADMIN — SENNOSIDES 17.2 MG: 8.6 TABLET, FILM COATED ORAL at 20:32

## 2024-01-14 RX ADMIN — Medication 3 MG: at 20:32

## 2024-01-14 ASSESSMENT — PAIN DESCRIPTION - LOCATION: LOCATION: GENERALIZED

## 2024-01-14 ASSESSMENT — PAIN DESCRIPTION - DESCRIPTORS: DESCRIPTORS: ACHING;SORE

## 2024-01-14 ASSESSMENT — PAIN SCALES - GENERAL: PAINLEVEL_OUTOF10: 4

## 2024-01-15 LAB
ANION GAP SERPL CALC-SCNC: 11 MEQ/L (ref 8–16)
BUN SERPL-MCNC: 12 MG/DL (ref 7–22)
CALCIUM SERPL-MCNC: 9.2 MG/DL (ref 8.5–10.5)
CHLORIDE SERPL-SCNC: 96 MEQ/L (ref 98–111)
CO2 SERPL-SCNC: 24 MEQ/L (ref 23–33)
CREAT SERPL-MCNC: 0.9 MG/DL (ref 0.4–1.2)
DEPRECATED RDW RBC AUTO: 47.4 FL (ref 35–45)
ERYTHROCYTE [DISTWIDTH] IN BLOOD BY AUTOMATED COUNT: 12.5 % (ref 11.5–14.5)
GFR SERPL CREATININE-BSD FRML MDRD: > 60 ML/MIN/1.73M2
GLUCOSE SERPL-MCNC: 85 MG/DL (ref 70–108)
HCT VFR BLD AUTO: 35.9 % (ref 42–52)
HGB BLD-MCNC: 11.8 GM/DL (ref 14–18)
MCH RBC QN AUTO: 33.5 PG (ref 26–33)
MCHC RBC AUTO-ENTMCNC: 32.9 GM/DL (ref 32.2–35.5)
MCV RBC AUTO: 102 FL (ref 80–94)
PLATELET # BLD AUTO: 245 THOU/MM3 (ref 130–400)
PMV BLD AUTO: 9.9 FL (ref 9.4–12.4)
POTASSIUM SERPL-SCNC: 4.2 MEQ/L (ref 3.5–5.2)
RBC # BLD AUTO: 3.52 MILL/MM3 (ref 4.7–6.1)
SODIUM SERPL-SCNC: 131 MEQ/L (ref 135–145)
WBC # BLD AUTO: 3.1 THOU/MM3 (ref 4.8–10.8)

## 2024-01-15 PROCEDURE — 36415 COLL VENOUS BLD VENIPUNCTURE: CPT

## 2024-01-15 PROCEDURE — 99231 SBSQ HOSP IP/OBS SF/LOW 25: CPT | Performed by: INTERNAL MEDICINE

## 2024-01-15 PROCEDURE — 6370000000 HC RX 637 (ALT 250 FOR IP)

## 2024-01-15 PROCEDURE — 2580000003 HC RX 258

## 2024-01-15 PROCEDURE — 97110 THERAPEUTIC EXERCISES: CPT

## 2024-01-15 PROCEDURE — 85027 COMPLETE CBC AUTOMATED: CPT

## 2024-01-15 PROCEDURE — 80048 BASIC METABOLIC PNL TOTAL CA: CPT

## 2024-01-15 PROCEDURE — 1200000000 HC SEMI PRIVATE

## 2024-01-15 RX ORDER — ENEMA 19; 7 G/133ML; G/133ML
1 ENEMA RECTAL ONCE
Status: COMPLETED | OUTPATIENT
Start: 2024-01-15 | End: 2024-01-15

## 2024-01-15 RX ORDER — ENEMA 19; 7 G/133ML; G/133ML
1 ENEMA RECTAL ONCE
Status: DISCONTINUED | OUTPATIENT
Start: 2024-01-15 | End: 2024-01-16 | Stop reason: HOSPADM

## 2024-01-15 RX ORDER — BISACODYL 10 MG
10 SUPPOSITORY, RECTAL RECTAL ONCE
Status: COMPLETED | OUTPATIENT
Start: 2024-01-15 | End: 2024-01-15

## 2024-01-15 RX ADMIN — AMLODIPINE BESYLATE 10 MG: 10 TABLET ORAL at 08:46

## 2024-01-15 RX ADMIN — SODIUM PHOSPHATE 1 ENEMA: 7; 19 ENEMA RECTAL at 12:35

## 2024-01-15 RX ADMIN — PANTOPRAZOLE SODIUM 40 MG: 40 TABLET, DELAYED RELEASE ORAL at 05:10

## 2024-01-15 RX ADMIN — SODIUM CHLORIDE, PRESERVATIVE FREE 10 ML: 5 INJECTION INTRAVENOUS at 08:48

## 2024-01-15 RX ADMIN — FLUTICASONE PROPIONATE 1 SPRAY: 50 SPRAY, METERED NASAL at 08:47

## 2024-01-15 RX ADMIN — SENNOSIDES 17.2 MG: 8.6 TABLET, FILM COATED ORAL at 21:52

## 2024-01-15 RX ADMIN — ACETAMINOPHEN 650 MG: 325 TABLET ORAL at 12:40

## 2024-01-15 RX ADMIN — DICLOFENAC SODIUM 2 G: 10 GEL TOPICAL at 21:54

## 2024-01-15 RX ADMIN — DICLOFENAC SODIUM 2 G: 10 GEL TOPICAL at 12:34

## 2024-01-15 RX ADMIN — BISACODYL 10 MG: 10 SUPPOSITORY RECTAL at 21:53

## 2024-01-15 RX ADMIN — Medication 3 MG: at 21:53

## 2024-01-15 RX ADMIN — HYDROCORTISONE: 1 CREAM TOPICAL at 21:54

## 2024-01-15 RX ADMIN — DOXAZOSIN 2 MG: 2 TABLET ORAL at 21:53

## 2024-01-15 RX ADMIN — SODIUM CHLORIDE, PRESERVATIVE FREE 10 ML: 5 INJECTION INTRAVENOUS at 21:53

## 2024-01-15 ASSESSMENT — PAIN SCALES - GENERAL
PAINLEVEL_OUTOF10: 5
PAINLEVEL_OUTOF10: 4
PAINLEVEL_OUTOF10: 4
PAINLEVEL_OUTOF10: 3

## 2024-01-15 ASSESSMENT — PAIN DESCRIPTION - PAIN TYPE
TYPE: CHRONIC PAIN;ACUTE PAIN
TYPE: ACUTE PAIN

## 2024-01-15 ASSESSMENT — PAIN DESCRIPTION - ORIENTATION
ORIENTATION: LEFT;RIGHT
ORIENTATION: RIGHT;LEFT

## 2024-01-15 ASSESSMENT — PAIN DESCRIPTION - LOCATION
LOCATION: KNEE
LOCATION: GENERALIZED
LOCATION: KNEE

## 2024-01-15 ASSESSMENT — PAIN - FUNCTIONAL ASSESSMENT
PAIN_FUNCTIONAL_ASSESSMENT: PREVENTS OR INTERFERES SOME ACTIVE ACTIVITIES AND ADLS
PAIN_FUNCTIONAL_ASSESSMENT: PREVENTS OR INTERFERES SOME ACTIVE ACTIVITIES AND ADLS

## 2024-01-15 ASSESSMENT — PAIN DESCRIPTION - FREQUENCY
FREQUENCY: INTERMITTENT
FREQUENCY: INTERMITTENT

## 2024-01-15 ASSESSMENT — PAIN DESCRIPTION - ONSET
ONSET: ON-GOING
ONSET: ON-GOING

## 2024-01-15 ASSESSMENT — PAIN DESCRIPTION - DESCRIPTORS
DESCRIPTORS: ACHING;BURNING
DESCRIPTORS: ACHING
DESCRIPTORS: ACHING;SORE

## 2024-01-15 NOTE — CARE COORDINATION
1/15/24, 12:24 PM EST    DISCHARGE PLANNING EVALUATION    Call placed to Josep at [facility], inquired about precert.  He has not heard but will call to see what he can find out.

## 2024-01-16 ENCOUNTER — APPOINTMENT (OUTPATIENT)
Dept: GENERAL RADIOLOGY | Age: 69
DRG: 393 | End: 2024-01-16
Payer: MEDICARE

## 2024-01-16 VITALS
WEIGHT: 143.3 LBS | RESPIRATION RATE: 16 BRPM | TEMPERATURE: 97.5 F | DIASTOLIC BLOOD PRESSURE: 72 MMHG | SYSTOLIC BLOOD PRESSURE: 118 MMHG | BODY MASS INDEX: 21.72 KG/M2 | HEART RATE: 88 BPM | OXYGEN SATURATION: 100 % | HEIGHT: 68 IN

## 2024-01-16 LAB
ANION GAP SERPL CALC-SCNC: 10 MEQ/L (ref 8–16)
BUN SERPL-MCNC: 11 MG/DL (ref 7–22)
CALCIUM SERPL-MCNC: 9 MG/DL (ref 8.5–10.5)
CHLORIDE SERPL-SCNC: 96 MEQ/L (ref 98–111)
CO2 SERPL-SCNC: 23 MEQ/L (ref 23–33)
CREAT SERPL-MCNC: 1 MG/DL (ref 0.4–1.2)
GFR SERPL CREATININE-BSD FRML MDRD: > 60 ML/MIN/1.73M2
GLUCOSE SERPL-MCNC: 83 MG/DL (ref 70–108)
POTASSIUM SERPL-SCNC: 3.9 MEQ/L (ref 3.5–5.2)
SODIUM SERPL-SCNC: 129 MEQ/L (ref 135–145)

## 2024-01-16 PROCEDURE — 94761 N-INVAS EAR/PLS OXIMETRY MLT: CPT

## 2024-01-16 PROCEDURE — 2580000003 HC RX 258

## 2024-01-16 PROCEDURE — 6370000000 HC RX 637 (ALT 250 FOR IP)

## 2024-01-16 PROCEDURE — 36415 COLL VENOUS BLD VENIPUNCTURE: CPT

## 2024-01-16 PROCEDURE — 74018 RADEX ABDOMEN 1 VIEW: CPT

## 2024-01-16 PROCEDURE — 94640 AIRWAY INHALATION TREATMENT: CPT

## 2024-01-16 PROCEDURE — 80048 BASIC METABOLIC PNL TOTAL CA: CPT

## 2024-01-16 RX ORDER — ACETAMINOPHEN 325 MG/1
650 TABLET ORAL EVERY 6 HOURS PRN
Qty: 120 TABLET | Refills: 0 | DISCHARGE
Start: 2024-01-16 | End: 2024-01-16 | Stop reason: HOSPADM

## 2024-01-16 RX ORDER — LANOLIN ALCOHOL/MO/W.PET/CERES
3 CREAM (GRAM) TOPICAL NIGHTLY PRN
Refills: 1 | DISCHARGE
Start: 2024-01-16 | End: 2024-01-16 | Stop reason: HOSPADM

## 2024-01-16 RX ORDER — FLUTICASONE PROPIONATE 50 MCG
1 SPRAY, SUSPENSION (ML) NASAL DAILY
Qty: 16 G | Refills: 0 | Status: SHIPPED | OUTPATIENT
Start: 2024-01-17

## 2024-01-16 RX ORDER — SENNA AND DOCUSATE SODIUM 50; 8.6 MG/1; MG/1
2 TABLET, FILM COATED ORAL DAILY
Qty: 60 TABLET | Refills: 1 | Status: SHIPPED | OUTPATIENT
Start: 2024-01-16 | End: 2024-01-16

## 2024-01-16 RX ORDER — CALCIUM CARBONATE 500 MG/1
500 TABLET, CHEWABLE ORAL 3 TIMES DAILY PRN
DISCHARGE
Start: 2024-01-16 | End: 2024-01-16

## 2024-01-16 RX ORDER — PANTOPRAZOLE SODIUM 40 MG/1
40 TABLET, DELAYED RELEASE ORAL
Qty: 30 TABLET | Refills: 3 | DISCHARGE
Start: 2024-01-17

## 2024-01-16 RX ORDER — POLYETHYLENE GLYCOL 3350 17 G/17G
17 POWDER, FOR SOLUTION ORAL DAILY PRN
Qty: 527 G | Refills: 1 | DISCHARGE
Start: 2024-01-16 | End: 2024-01-16

## 2024-01-16 RX ORDER — POLYETHYLENE GLYCOL 3350 17 G/17G
17 POWDER, FOR SOLUTION ORAL DAILY PRN
Qty: 527 G | Refills: 1 | Status: SHIPPED | OUTPATIENT
Start: 2024-01-16 | End: 2024-03-18

## 2024-01-16 RX ORDER — IPRATROPIUM BROMIDE AND ALBUTEROL SULFATE 2.5; .5 MG/3ML; MG/3ML
1 SOLUTION RESPIRATORY (INHALATION) ONCE
Status: COMPLETED | OUTPATIENT
Start: 2024-01-16 | End: 2024-01-16

## 2024-01-16 RX ORDER — MECLIZINE HCL 25MG 25 MG/1
25 TABLET, CHEWABLE ORAL 3 TIMES DAILY PRN
Qty: 15 TABLET | Refills: 0 | Status: SHIPPED | OUTPATIENT
Start: 2024-01-16 | End: 2024-01-21

## 2024-01-16 RX ORDER — DOCUSATE SODIUM 100 MG/1
100 CAPSULE, LIQUID FILLED ORAL 2 TIMES DAILY PRN
Qty: 60 CAPSULE | Refills: 0 | DISCHARGE
Start: 2024-01-16 | End: 2024-01-16

## 2024-01-16 RX ORDER — FLUTICASONE PROPIONATE 50 MCG
1 SPRAY, SUSPENSION (ML) NASAL DAILY
Qty: 16 G | Refills: 3 | DISCHARGE
Start: 2024-01-17 | End: 2024-01-16

## 2024-01-16 RX ORDER — DOCUSATE SODIUM 100 MG/1
100 CAPSULE, LIQUID FILLED ORAL 2 TIMES DAILY PRN
Qty: 60 CAPSULE | Refills: 0 | Status: SHIPPED | OUTPATIENT
Start: 2024-01-16 | End: 2024-02-15

## 2024-01-16 RX ORDER — SENNA AND DOCUSATE SODIUM 50; 8.6 MG/1; MG/1
2 TABLET, FILM COATED ORAL DAILY
Qty: 60 TABLET | Refills: 1 | Status: SHIPPED | OUTPATIENT
Start: 2024-01-16 | End: 2024-03-16

## 2024-01-16 RX ORDER — MECLIZINE HCL 25MG 25 MG/1
25 TABLET, CHEWABLE ORAL 3 TIMES DAILY PRN
Qty: 15 TABLET | Refills: 0 | Status: SHIPPED | OUTPATIENT
Start: 2024-01-16 | End: 2024-01-16

## 2024-01-16 RX ORDER — AMLODIPINE BESYLATE 10 MG/1
10 TABLET ORAL DAILY
Qty: 30 TABLET | Refills: 3 | Status: SHIPPED | OUTPATIENT
Start: 2024-01-16 | End: 2024-01-16

## 2024-01-16 RX ORDER — ONDANSETRON 4 MG/1
4 TABLET, ORALLY DISINTEGRATING ORAL EVERY 8 HOURS PRN
Qty: 21 TABLET | Refills: 0 | Status: SHIPPED | OUTPATIENT
Start: 2024-01-16

## 2024-01-16 RX ORDER — ONDANSETRON 4 MG/1
4 TABLET, ORALLY DISINTEGRATING ORAL EVERY 8 HOURS PRN
DISCHARGE
Start: 2024-01-16 | End: 2024-01-16

## 2024-01-16 RX ORDER — AMLODIPINE BESYLATE 10 MG/1
10 TABLET ORAL DAILY
Qty: 30 TABLET | Refills: 2 | Status: SHIPPED | OUTPATIENT
Start: 2024-01-16

## 2024-01-16 RX ORDER — CALCIUM CARBONATE 500 MG/1
1 TABLET, CHEWABLE ORAL 3 TIMES DAILY PRN
Qty: 90 TABLET | Refills: 0 | Status: SHIPPED | OUTPATIENT
Start: 2024-01-16

## 2024-01-16 RX ADMIN — DICLOFENAC SODIUM 2 G: 10 GEL TOPICAL at 09:44

## 2024-01-16 RX ADMIN — FLUTICASONE PROPIONATE 1 SPRAY: 50 SPRAY, METERED NASAL at 09:45

## 2024-01-16 RX ADMIN — AMLODIPINE BESYLATE 10 MG: 10 TABLET ORAL at 09:44

## 2024-01-16 RX ADMIN — PANTOPRAZOLE SODIUM 40 MG: 40 TABLET, DELAYED RELEASE ORAL at 05:10

## 2024-01-16 RX ADMIN — HYDROCORTISONE: 1 CREAM TOPICAL at 09:46

## 2024-01-16 RX ADMIN — SODIUM CHLORIDE, PRESERVATIVE FREE 10 ML: 5 INJECTION INTRAVENOUS at 09:46

## 2024-01-16 RX ADMIN — IPRATROPIUM BROMIDE AND ALBUTEROL SULFATE 1 DOSE: .5; 3 SOLUTION RESPIRATORY (INHALATION) at 10:24

## 2024-01-16 ASSESSMENT — PAIN DESCRIPTION - LOCATION: LOCATION: GENERALIZED

## 2024-01-16 ASSESSMENT — PAIN DESCRIPTION - DESCRIPTORS: DESCRIPTORS: ACHING

## 2024-01-16 ASSESSMENT — PAIN DESCRIPTION - ONSET: ONSET: ON-GOING

## 2024-01-16 ASSESSMENT — PAIN - FUNCTIONAL ASSESSMENT: PAIN_FUNCTIONAL_ASSESSMENT: ACTIVITIES ARE NOT PREVENTED

## 2024-01-16 ASSESSMENT — PAIN DESCRIPTION - FREQUENCY: FREQUENCY: CONTINUOUS

## 2024-01-16 ASSESSMENT — PAIN SCALES - GENERAL: PAINLEVEL_OUTOF10: 5

## 2024-01-16 ASSESSMENT — PAIN DESCRIPTION - PAIN TYPE: TYPE: CHRONIC PAIN

## 2024-01-16 NOTE — CARE COORDINATION
1/16/24, 10:29 AM EST    DISCHARGE PLANNING EVALUATION    Left message for Josep at [facility] requesting update with precert and asking if patient can go under medicaid while waiting for precert determination. Requested return call.    1/16/24, 11:56 AM EST    DISCHARGE PLANNING EVALUATION    Josep from [facility] returned call, they will accept patient under his medicaid while waiting for precert determination.  Advised hope to discharge today, need LOC and transport arranged. Will get back with details.    3:45 PM  Received call from RN, patient has friend in room and told him not to go to [facility].  Patient will be going home with this friend.      1/16/24, 3:46 PM EST    Patient goals/plan/ treatment preferences discussed by  and .  Patient goals/plan/ treatment preferences reviewed with patient/ family.  Patient/ family verbalize understanding of discharge plan and are in agreement with goal/plan/treatment preferences.  Understanding was demonstrated using the teach back method.  AVS provided by RN at time of discharge, which includes all necessary medical information pertaining to the patients current course of illness, treatment, post-discharge goals of care, and treatment preferences.     Services At/After Discharge: None       IMM Letter  IMM Letter given to Patient/Family/Significant other/Guardian/POA/by:: Deb JONES Case Manager  IMM Letter date given:: 01/16/24  IMM Letter time given:: 1201

## 2024-01-16 NOTE — PROGRESS NOTES
Hospitalist Progress Note      Patient:  Ronn Ramires    Unit/Bed:8B-21/021-A  YOB: 1955  MRN: [id number]   Acct: [id number]   PCP: Camilo Guadalupe MD  Date of Admission: 1/3/2024    Assessment/Plan:    Syncope: due to dehydration from diarrhea, vomiting, poor PO intake  1/12/24: stop orthostatic VS, they are benign; await ECF placement for additional therapy  Overflow diarrhea: small volume loose stool; CT scan showed retained stool  1/12/24: continues to complain of small volume soft stool; check KUB in the AM and will consider enema if still present  Chronic hyponatremia: essentially at baseline  Hypothermia: on arrival - resolved  Bicytopenia: leukopenic and anemic; potentially related to hep c; stable  Chronic: GERD, HTN      Expected discharge date:  pending precert    Disposition:    [] Home       [] TCU       [] Rehab       [] Psych       [x] SNF       [] Long Term Care Facility       [] Other-    Chief Complaint: Syncope    Hospital Treatment Course: (Prior to today) \"68-year-old male with PMH of HTN, GERD, BPH, chronic hyponatremia presents to the ED today for a questionable syncopal episode. He remembers sitting down on a porch outside and the next thing he remembers was being woken up by a . He states he has had decreased p.o. intake and has had diarrhea when he takes his medications, he has tried Pepto-Bismol without any alleviation. He also stated there was an episode of vomiting that he does not remember but he found it on his jacket upon arrival to the ED. He also endorses right lower quadrant abdominal pain that does not radiate and is about the size of a quarter and only hurts when it is pressed upon. Upon arrival to the ED his temperature was 93.7 which increased to 97.1 with the use of a Delmy hugger. , creatinine 1.6, lactic acid 2.6, troponin 14, AST 71, TSH 4.560, alcohol level negative, blood 
                                                   Hospitalist Progress Note      Patient:  Ronn Ramires    Unit/Bed:8B-21/021-A  YOB: 1955  MRN: [id number]   Acct: [id number]   PCP: Camilo Guadalupe MD  Date of Admission: 1/3/2024    Assessment/Plan:    Syncope: due to dehydration from diarrhea, vomiting, poor PO intake  1/12/24: stop orthostatic VS, they are benign; await ECF placement for additional therapy  Overflow diarrhea: small volume loose stool; CT scan showed retained stool  1/12/24: continues to complain of small volume soft stool; check KUB in the AM and will consider enema if still present  1/13/24: KUB unchanged, pt still reluctant to try enema, give lactulose x2 doses and if still constipated in the AM give enemas  Chronic hyponatremia: essentially at baseline  Hypothermia: on arrival - resolved  Bicytopenia: leukopenic and anemic; potentially related to hep c; stable  Chronic: GERD, HTN      Expected discharge date:  pending precert    Disposition:    [] Home       [] TCU       [] Rehab       [] Psych       [x] SNF       [] Long Term Care Facility       [] Other-    Chief Complaint: Syncope    Hospital Treatment Course: (Prior to today) \"68-year-old male with PMH of HTN, GERD, BPH, chronic hyponatremia presents to the ED today for a questionable syncopal episode. He remembers sitting down on a porch outside and the next thing he remembers was being woken up by a . He states he has had decreased p.o. intake and has had diarrhea when he takes his medications, he has tried Pepto-Bismol without any alleviation. He also stated there was an episode of vomiting that he does not remember but he found it on his jacket upon arrival to the ED. He also endorses right lower quadrant abdominal pain that does not radiate and is about the size of a quarter and only hurts when it is pressed upon. Upon arrival to the ED his temperature was 93.7 which increased to 97.1 with 
    Internal Medicine Resident History and Physical Note    Name: Ronn Ramires, male, : 1955, MRN: [id number]    PCP: Camilo Guadalupe MD    Date of Admission: 1/3/2024  Date of Service: Pt seen/examined on 24      Assessment/Plan:  syncopal event: Likely due to dehydration 2/2 diarrhea, vomiting and decreased oral intake. Patient found on the ground of a porch, does not remember anything past sitting down then being woken up by a .  Echo 10/11/2023 showed EF 55 to 60%, grade 1 diastolic dysfunction, moderately dilated left atrium, severely dilated right atrium, mild mitral regurg, mild tricuspid regurg. CXR negative. Orthostatics positive yesterday, negative today.  MRI brain 2024 showed no acute findings, moderate chronic small vessel ischemic changes.  Volume loss of the corpus callosum.  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  We are continuing to wait for ECF placement              Orthostatic vitals, now negative              No need for new echo as he had one 3 months ago   Will discharge on a 30 day event monitor  Patient continues to have dizziness, improving    OT for vestibular therapy    Overflow Diarrhea: Has been going when he takes his home medications of amlodipine and doxazosin.  He also states he has had an episode of vomiting, but does not remember it, found it on his jacket when he got to the ED. Denies diarrhea since admission. CT scan showed some stool in the rectal vault. Suspicion for constipation with overflow diarrhea.  Patient continues to have mild diarrhea.  KUB  showed constipation.  Continuing to wait for the patient to pass large stool.  States he has had some cramping from the medication which is an expected side effect.  KUB  showed dense retained colonic stool  -Senna 2 tablets nightly  -Performed an enema today with success      Chronic Hyponatremia: Stable 128 on arrival, chronically 
    Internal Medicine Resident History and Physical Note    Name: Ronn Ramires, male, : 1955, MRN: [id number]    PCP: Camilo Guadalupe MD    Date of Admission: 1/3/2024  Date of Service: Pt seen/examined on 24      Assessment/Plan:  syncopal event: Likely due to dehydration 2/2 diarrhea, vomiting and decreased oral intake. Patient found on the ground of a porch, does not remember anything past sitting down then being woken up by a .  Echo 10/11/2023 showed EF 55 to 60%, grade 1 diastolic dysfunction, moderately dilated left atrium, severely dilated right atrium, mild mitral regurg, mild tricuspid regurg. CXR negative. Orthostatics positive yesterday, negative today.              Orthostatic vitals              Telemetry              Normal saline at 75 mL an hour              No need for new echo as he had one 3 months ago   Will discharge on a 30 day event monitor  Patient continues to have dizziness but it has decreased since meclizine was given.    OT for some vestibular therapy    CAP: CT chest showed consolidation, patient has had a productive cough. Could explain why he has been feeling unwell for the last several days.    Respiratory culture and PNA panel   Ceftriaxone -   Azithromycin -    Diarrhea: improving. Has been going when he takes his home medications of amlodipine and doxazosin.  He also states he has had an episode of vomiting, but does not remember it, found it on his jacket when he got to the ED. Denies diarrhea since admission. CT scan showed some stool in the rectal vault. Suspicion for constipation with overflow diarrhea.              Check BMP tomorrow    Miralax BIN   Milk of magnesium today      Chronic Hyponatremia: 128 on arrival, chronically low, around baseline.   Continue IVF.  Recheck BMP tomorrow.     Hx cocaine use: History will order UDS     GERD: Patient takes Protonix however he has been out.  Will restart  HTN:  Patient takes 
    Internal Medicine Resident History and Physical Note    Name: Ronn Ramires, male, : 1955, MRN: [id number]    PCP: Camilo Guadalupe MD    Date of Admission: 1/3/2024  Date of Service: Pt seen/examined on 24      Assessment/Plan:  syncopal event: Likely due to dehydration 2/2 diarrhea, vomiting and decreased oral intake. Patient found on the ground of a porch, does not remember anything past sitting down then being woken up by a .  Echo 10/11/2023 showed EF 55 to 60%, grade 1 diastolic dysfunction, moderately dilated left atrium, severely dilated right atrium, mild mitral regurg, mild tricuspid regurg. CXR negative. Orthostatics positive yesterday, negative today.  MRI brain 2024 showed no acute findings, moderate chronic small vessel ischemic changes.  Volume loss of the corpus callosum.  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              Orthostatic vitals, now negative              No need for new echo as he had one 3 months ago   Will discharge on a 30 day event monitor  Patient continues to have dizziness, improving    OT for vestibular therapy    Overflow Diarrhea: Has been going when he takes his home medications of amlodipine and doxazosin.  He also states he has had an episode of vomiting, but does not remember it, found it on his jacket when he got to the ED. Denies diarrhea since admission. CT scan showed some stool in the rectal vault. Suspicion for constipation with overflow diarrhea.  Patient continues to have mild diarrhea.  KUB  showed constipation.  Continuing to wait for the patient to pass large stool.  States he has had some cramping from the medication which is an expected side effect   Senna 2 tablets nightly     Chronic Hyponatremia: 128 on arrival, chronically low, around baseline.   Continue IVF.  Recheck BMP tomorrow.     Hx cocaine use: History will order UDS     GERD: Patient takes Protonix 
    Internal Medicine Resident History and Physical Note    Name: Ronn Ramires, male, : 1955, MRN: [id number]    PCP: Camilo Guadalupe MD    Date of Admission: 1/3/2024  Date of Service: Pt seen/examined on 01/10/24      Assessment/Plan:  syncopal event: Likely due to dehydration 2/2 diarrhea, vomiting and decreased oral intake. Patient found on the ground of a porch, does not remember anything past sitting down then being woken up by a .  Echo 10/11/2023 showed EF 55 to 60%, grade 1 diastolic dysfunction, moderately dilated left atrium, severely dilated right atrium, mild mitral regurg, mild tricuspid regurg. CXR negative. Orthostatics positive yesterday, negative today.  MRI brain 2024 showed no acute findings, moderate chronic small vessel ischemic changes.  Volume loss of the corpus callosum.  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              Orthostatic vitals, now negative              No need for new echo as he had one 3 months ago   Will discharge on a 30 day event monitor  Patient continues to have dizziness, improving    OT for vestibular therapy    Overflow Diarrhea: Has been going when he takes his home medications of amlodipine and doxazosin.  He also states he has had an episode of vomiting, but does not remember it, found it on his jacket when he got to the ED. Denies diarrhea since admission. CT scan showed some stool in the rectal vault. Suspicion for constipation with overflow diarrhea.  Patient continues to have mild diarrhea.  KUB  showed constipation              Check BMP tomorrow     Senna 2 tablets nightly     Chronic Hyponatremia: 128 on arrival, chronically low, around baseline.   Continue IVF.  Recheck BMP tomorrow.     Hx cocaine use: History will order UDS     GERD: Patient takes Protonix however he has been out.  Will restart  HTN:  Patient takes amlodipine and doxazosin. Patient states his medication 
    Internal Medicine Resident History and Physical Note    Name: Ronn Ramires, male, : 1955, MRN: [id number]    PCP: Camilo Guadlaupe MD    Date of Admission: 1/3/2024  Date of Service: Pt seen/examined on 24      Assessment/Plan:  syncopal event: Likely due to dehydration 2/2 diarrhea, vomiting and decreased oral intake. Patient found on the ground of a porch, does not remember anything past sitting down then being woken up by a .  Echo 10/11/2023 showed EF 55 to 60%, grade 1 diastolic dysfunction, moderately dilated left atrium, severely dilated right atrium, mild mitral regurg, mild tricuspid regurg. CXR negative. Orthostatics positive yesterday, negative today.              Orthostatic vitals              Telemetry              Normal saline at 75 mL an hour              No need for new echo as he had one 3 months ago   Will discharge on a 30 day event monitor  Patient continues to have dizziness but it has decreased since meclizine was given.    OT for some vestibular therapy  -MRI brain to rule out any cause of his dizziness    CAP: CT chest showed consolidation, patient has had a productive cough. Could explain why he has been feeling unwell for the last several days.    Respiratory culture and PNA panel   Ceftriaxone -   Azithromycin -    Diarrhea: improving. Has been going when he takes his home medications of amlodipine and doxazosin.  He also states he has had an episode of vomiting, but does not remember it, found it on his jacket when he got to the ED. Denies diarrhea since admission. CT scan showed some stool in the rectal vault. Suspicion for constipation with overflow diarrhea.              Check BMP tomorrow    Miralax BIN   Milk of magnesium today    Patient refused laxative yesterday because he has been complaining of diarrhea however we believe this is overflow from constipation found on CT scan     Chronic Hyponatremia: 128 on arrival, 
    Internal Medicine Resident History and Physical Note    Name: Ronn Ramires, male, : 1955, MRN: [id number]    PCP: Camilo Guadalupe MD    Date of Admission: 1/3/2024  Date of Service: Pt seen/examined on 01/15/24      Assessment/Plan:  syncopal event: Likely due to dehydration 2/2 diarrhea, vomiting and decreased oral intake. Patient found on the ground of a porch, does not remember anything past sitting down then being woken up by a .  Echo 10/11/2023 showed EF 55 to 60%, grade 1 diastolic dysfunction, moderately dilated left atrium, severely dilated right atrium, mild mitral regurg, mild tricuspid regurg. CXR negative. Orthostatics positive yesterday, negative today.  MRI brain 2024 showed no acute findings, moderate chronic small vessel ischemic changes.  Volume loss of the corpus callosum.  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  We are continuing to wait for ECF placement              No need for new echo as he had one 3 months ago  Patient continues to have dizziness but states it is not as noticeable  OT for vestibular therapy    Overflow Diarrhea: Has been going when he takes his home medications of amlodipine and doxazosin.  He also states he has had an episode of vomiting, but does not remember it, found it on his jacket when he got to the ED. Denies diarrhea since admission. CT scan showed some stool in the rectal vault. Suspicion for constipation with overflow diarrhea.  Patient continues to have mild diarrhea.  KUB  showed constipation.  Continuing to wait for the patient to pass large stool.  States he has had some cramping from the medication which is an expected side effect.  KUB  showed dense retained colonic stool  -Senna 2 tablets nightly  -Performed an enema today with success, patient feels there is more stool in there, will repeat enema today     Chronic Hyponatremia: Stable 128 on arrival, chronically 
    Internal Medicine Resident History and Physical Note    Name: Ronn Ramires, male, : 1955, MRN: [id number]    PCP: Camilo Guadalupe MD    Date of Admission: 1/3/2024  Date of Service: Pt seen/examined on 24      Assessment/Plan:  syncopal event: Likely due to dehydration 2/2 diarrhea, vomiting and decreased oral intake. Patient found on the ground of a porch, does not remember anything past sitting down then being woken up by a .  Echo 10/11/2023 showed EF 55 to 60%, grade 1 diastolic dysfunction, moderately dilated left atrium, severely dilated right atrium, mild mitral regurg, mild tricuspid regurg. CXR negative. Orthostatics positive yesterday, negative today.  MRI brain 2024 showed no acute findings, moderate chronic small vessel ischemic changes.  Volume loss of the corpus callosum.  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              Orthostatic vitals, now negative              Telemetry              No need for new echo as he had one 3 months ago   Will discharge on a 30 day event monitor  Patient continues to have dizziness, improving    OT for some vestibular therapy    Diarrhea: Has been going when he takes his home medications of amlodipine and doxazosin.  He also states he has had an episode of vomiting, but does not remember it, found it on his jacket when he got to the ED. Denies diarrhea since admission. CT scan showed some stool in the rectal vault. Suspicion for constipation with overflow diarrhea.  Patient continues to have mild diarrhea.  KUB  showed constipation              Check BMP tomorrow    Miralax BID   Milk of magnesium today    Patient refused laxative yesterday because he has been complaining of diarrhea however we believe this is overflow from constipation found on CT scan, talked to patient and he seemed agreeable to resume his MiraLAX     Chronic Hyponatremia: 128 on arrival, chronically 
    Internal Medicine Resident Progress Note    Name: Ronn Ramires, male, : 1955, MRN: [id number]    PCP: Camilo Guadalupe MD    Date of Admission: 1/3/2024  Date of Service: Pt seen/examined on 24    Assessment/Plan:    Syncopal event/dizziness: Likely secondary to orthostatic hypotension in the setting of dehydration from his recent decreased oral intake nausea and vomiting.  Patient also endorses diarrhea.  Initially found on porch does not remember passing out but was awakened by .  Previous echo on 10/11/2023 showed EF 55 to 60% with grade 1 diastolic dysfunction, mildly dilated LA, severely dilated RA with mild right regurgitation.  Chest x-ray negative  -Orthostatics remain positive  -Continue to monitor*orthostatic vital signs  -Continuing normal saline at 75 cc/h.  Will reevaluate IV fluids tomorrow 2024  -With patient's history of nonischemic cardiomyopathy will discharge patient on 30-day event monitor for monitoring of arrhythmias  -Continue monitor patient on telemetry    Community-acquired pneumonia: Suspected possible cause of patient's dizziness and feeling overall unwell.  Consolidation seen on CTA  -Started on Rocephin 1 g every 24 hours.  5-day course  -Started on azithromycin 500 mg, daily, 3-day course.    History hepatitis C: Unclear if previously treated or not.  -Lab work ordered for further evaluation will initiate treatment if needed    Diarrhea: Suspect overflow in the setting of constipation.  Recent dental CT scan showed fecal stasis.  Will continue MiraLAX and prescribed at twice daily.  Patient denies recent bowel movements  -Continue monitor patient's electrolytes, daily BMP    Chronic hyponatremia: Chronic low, at baseline  -Will continue to monitor patient's sodium with daily BMP  -Likely hypovolemic hyponatremia    Acute kidney injury:  Resolved.  -On IV fluids    History of alcohol abuse: Denies prior alcohol withdrawal symptoms.  
    Low Risk Nutrition Note      Recommendations/Plan:  Recommend MVI, continue diet per provider and encourage PO intake at best efforts. Monitor for SOB after eating.     Nutrition Assessment:   Pt seen, reports he was having some difficulties breathing. Pt states it is difficult to breathe after eating - asked pt if he needed to be set up to assist in breathing, pt declined. Pt stated that he was eating well and had no questions or concerns.     Reason for Visit:  Initial, RD Nutrition Re-Screen/LOS    Current Nutrition Therapies:  ADULT DIET; Regular    Height:  172.7 cm (5' 8\")    Current Body Weight:  65 kg (143 lb 4.8 oz) (1/11: no edema noted)       Diagnosis:  No nutrition diagnosis at this time.    Monitoring and Evaluation:  Patient will be monitored per nutrition standards of care.     Consult Dietitian if nutrition intervention essential to patient care is needed.     Discharge Planning:  No needs      Electronically signed by Arnav Guzman, ANNI, LD on 1/11/24 at 1:20 PM EST    Contact:  [phone]       
   01/08/24 1213   Encounter Summary   Encounter Overview/Reason  Initial Encounter   Service Provided For: Patient   Referral/Consult From: [facility] Unknown   Last Encounter  01/08/24   Complexity of Encounter High   Begin Time 1110   End Time  1120   Total Time Calculated 10 min   Assessment/Intervention/Outcome   Assessment Unable to assess   Intervention Sustaining Presence/Ministry of presence   Outcome Acceptance   Plan and Referrals   Plan/Referrals No future visits requested       ASSESSMENT  The patient is a 68-yr-old male who seems to be experiencing health issues that he was not yet sure about. He believes he has pneumonia or some other infections that he is not sure about.   Communication was not easy due to his being disturbed by the condition and how he was feeling.   He was \"feeling good.\"   INTERVENTION    I offered to pray with him. He was not in the mood for it, I discerned.I wished him a speedy recovery. I tried to comfort him which he appreciated.     OUTCOME    Continue to visit him. I told him to call us any time with a spiritual need he may have. He agreed.     CARE PLAN    
  Physician Progress Note      PATIENT:               TANNER VO  CSN #:                  [id number]  :                       1955  ADMIT DATE:       1/3/2024 1:23 PM  DISCH DATE:  RESPONDING  PROVIDER #:        Ashita Behl MD          QUERY TEXT:    Dr Behl,    Pt admitted with medication induced diarrhea & has CAP treated with ATB   documented. Note: CAP and HCAP indicate where the pneumonia was acquired, not   a specific type. If possible, please document in the progress notes and   discharge summary if you are evaluating and/or treating any of the following:    Note: CAP and HCAP indicate where the pneumonia was acquired, not a specific   type.    The medical record reflects the following:  Risk Factors: CAP  Clinical Indicators:  CT chest showed consolidation, patient has had a   productive cough. Could explain why he has been feeling unwell for the last   several days.  Respiratory culture and PNA panel  Treatment: Ceftriaxone - &  Azithromycin -    Amita Gonzalez BSN, RN  Options provided:  -- Gram negative pneumonia  -- Gram positive pneumonia  -- MRSA pneumonia  -- MSSA pneumonia  -- Viral pneumonia  -- Aspiration pneumonia  -- Hypostatic pneumonia  -- Other - I will add my own diagnosis  -- Disagree - Not applicable / Not valid  -- Disagree - Clinically unable to determine / Unknown  -- Refer to Clinical Documentation Reviewer    PROVIDER RESPONSE TEXT:    Community acquired pneumonia    Query created by: Bre Gonzalez on 2024 6:43 AM      Electronically signed by:  Ashita Behl MD 2024 8:06 AM          
  Physician Progress Note      PATIENT:               TANNER VO  CSN #:                  [id number]  :                       1955  ADMIT DATE:       1/3/2024 1:23 PM  DISCH DATE:  RESPONDING  PROVIDER #:        Ashita Behl MD          QUERY TEXT:    Dr Behl,    Pt admitted with Hypothermia, MICKY, & Dehydration. Per Patient: has had   diarrhea when he takes his medications-amlodipine and doxazosin, he has tried   Pepto-Bismol without any alleviation. If possible, please document the type of   colitis in the medical record.    The medical record reflects the following:  Risk Factors: homeless, alcoholic  Clinical Indicators:  Per notes: syncopal event: Likely due to dehydration 2/2   diarrhea, vomiting and decreased oral intake. Per Patient: has had diarrhea   when he takes his medications-amlodipine and doxazosin, he has tried   Pepto-Bismol without any alleviation.  Treatment: IV Zithromax & Rocephin, serial labs, holding losartan.    Amita Gonzalez BSN, RN  Options provided:  -- Dehydration due to medication-drug induced diarrhea.  -- Bacterial Colitis  -- Colitis due to other etiology, Please document other etiology.  -- Other - I will add my own diagnosis  -- Disagree - Not applicable / Not valid  -- Disagree - Clinically unable to determine / Unknown  -- Refer to Clinical Documentation Reviewer    PROVIDER RESPONSE TEXT:    This patient Dehydration due to medication-drug induced diarrhea.    Query created by: Bre Gonzalez on 2024 11:40 AM      Electronically signed by:  Ashita Behl MD 2024 1:29 PM          
  Physician Progress Note      PATIENT:               TANNER VO  CSN #:                  [id number]  :                       1955  ADMIT DATE:       1/3/2024 1:23 PM  DISCH DATE:  RESPONDING  PROVIDER #:        Ashita Behl MD          QUERY TEXT:    Dr Behl,    Pt admitted with medication induced diarrhea & has CAP treated with ATB   documented. If possible, please document in the progress notes and discharge   summary if you are evaluating and/or treating any of the following:    Note: CAP and HCAP indicate where the pneumonia was acquired, not a specific   type.    The medical record reflects the following:  Risk Factors: CAP  Clinical Indicators:  CT chest showed consolidation, patient has had a   productive cough. Could explain why he has been feeling unwell for the last   several days.  Respiratory culture and PNA panel  Treatment: Ceftriaxone - &  Azithromycin -    Amita Gonzalez BSN, RN  Options provided:  -- Gram negative pneumonia  -- Gram positive pneumonia  -- MRSA pneumonia  -- MSSA pneumonia  -- Viral pneumonia  -- Aspiration pneumonia  -- Hypostatic pneumonia  -- Other - I will add my own diagnosis  -- Disagree - Not applicable / Not valid  -- Disagree - Clinically unable to determine / Unknown  -- Refer to Clinical Documentation Reviewer    PROVIDER RESPONSE TEXT:    It is Community acquired pneumonia    Query created by: Bre Gonzalez on 2024 6:11 AM      Electronically signed by:  Ashita Behl MD 2024 7:57 AM          
 [facility]  INPATIENT PHYSICAL THERAPY  DAILY NOTE  STRZ MED SURG 8AB - 8B-21/021-A     Time In: 0851  Time Out: 09  Timed Code Treatment Minutes: 26 Minutes  Minutes: 26          Date: 2024  Patient Name: Ronn Ramires,  Gender:  male        MRN: [id number]  : 1955  (68 y.o.)     Referring Practitioner: Behl, Ashita, MD     Additional Pertinent Hx: Ronn Ramires is a 68 y.o. male with a history of alcohol abuse, cocaine abuse, cardiomyopathy, hep C who presents for evaluation of lethargy, nausea, diarrhea.  Patient was brought in by Police Department after being found slumped over on a porch.  Patient also recently experiencing homelessness.  He was hypothermic here to 93.7 rectal despite having multiple layers of clothes on.  He does describe testing positive for COVID several weeks ago.  Here in the ED, he immediately goes to bedside commode for significant diarrhea.  He describes continued diarrhea, difficulty taking deep breaths, dizziness, frontal headache.  He describes that he only drinks 1 can of beer a day max.  He states that he has a vodka but did not drink any today.  Has been in alcohol withdrawal previously.     Prior Level of Function:  Lives With: Other (comment) (Hotel)  Type of Home: Homeless        Ambulation Assistance: Independent  Transfer Assistance: Independent  Active : No  Additional Comments: Pt reports that he has been having difficulty finding an apartment and will likely have to go to a hotel/motel when discharged.    Restrictions/Precautions:  Restrictions/Precautions: Fall Risk, General Precautions  Position Activity Restriction  Other position/activity restrictions: dizziness and has had positive orthostatic BP      SUBJECTIVE: Pt reports having headache and \"rocking\". Pt wearing old back brace for support    PAIN: 8/10: headache and back    Vitals: Nurse checked vitals prior to session    OBJECTIVE:  Bed Mobility:  Supine to Sit: 
 [facility]  INPATIENT PHYSICAL THERAPY  STRZ MED SURG 8AB - 8B-21/021-A  Daily Note    Time In: 0741  Time Out: 0755  Timed Code Treatment Minutes: 14 Minutes  Minutes: 14          Date: 1/15/2024  Patient Name: Ronn Ramires,  Gender:  male        MRN: [id number]  : 1955  (68 y.o.)     Referring Practitioner: Behl, Ashita, MD     Additional Pertinent Hx: Ronn Ramires is a 68 y.o. male with a history of alcohol abuse, cocaine abuse, cardiomyopathy, hep C who presents for evaluation of lethargy, nausea, diarrhea.  Patient was brought in by Police Department after being found slumped over on a porch.  Patient also recently experiencing homelessness.  He was hypothermic here to 93.7 rectal despite having multiple layers of clothes on.  He does describe testing positive for COVID several weeks ago.  Here in the ED, he immediately goes to bedside commode for significant diarrhea.  He describes continued diarrhea, difficulty taking deep breaths, dizziness, frontal headache.  He describes that he only drinks 1 can of beer a day max.  He states that he has a vodka but did not drink any today.  Has been in alcohol withdrawal previously.     Prior Level of Function:  Lives With: Other (comment)  Type of Home: Homeless        ADL Assistance: Independent  Homemaking Assistance: Independent  Ambulation Assistance: Independent  Transfer Assistance: Independent  Active : No  Additional Comments: Pt reports that he has been having difficulty finding an apartment and will likely have to go to a hotel/motel when discharged.    Restrictions/Precautions:  Restrictions/Precautions: Fall Risk, General Precautions  Position Activity Restriction  Other position/activity restrictions: dizziness and has had positive orthostatic BP     SUBJECTIVE: Pt. Laying in his bed and pleasantly agrees to therapy session.  RN approved therapy session. Pt. Slightly impulsive during session. Pt. Requests to use 
[facility]  STRZ MED SURG 8AB  Occupational Therapy  Daily Note  Time:   Time In: 1109  Time Out: 1125  Timed Code Treatment Minutes: 16 Minutes  Minutes: 16          Date: 1/15/2024  Patient Name: Ronn Ramires,   Gender: male      Room: 8B-21/021-A  MRN: [id number]  : 1955  (68 y.o.)  Referring Practitioner: Rhett Miranda DO  Diagnosis: Syncope and collapse  Additional Pertinent Hx: per chart review; 68-year-old male with PMH of HTN, GERD, BPH, chronic hyponatremia presents to the ED today for a questionable syncopal episode.  He remembers sitting down on a porch outside and the next thing he remembers was being woken up by a .  He states he has had decreased p.o. intake and has had diarrhea when he takes his medications, he has tried Pepto-Bismol without any alleviation.  He also stated there was an episode of vomiting that he does not remember but he found it on his jacket upon arrival to the ED.  He also endorses right lower quadrant abdominal pain that does not radiate and is about the size of a quarter and only hurts when it is pressed upon.  Upon arrival to the ED his temperature was 93.7 which increased to 97.1 with the use of a Delmy hugger.  , creatinine 1.6, lactic acid 2.6, troponin 14, AST 71, TSH 4.560, alcohol level negative, blood cultures drawn, COVID and flu negative, CXR negative, CT head mildly dilated ventricular system, stable.    Restrictions/Precautions:  Restrictions/Precautions: Fall Risk, General Precautions  Position Activity Restriction  Other position/activity restrictions: dizziness and has had positive orthostatic BP     SUBJECTIVE: Patient was sleeping in bed upon arrival of OT, minimal verbal stimulation to alert. Nurse approved of session. Patient was agreeable to OT, at times patient became irritable requiring education for OT participation.    PAIN: No pain was reported, patient noted periods of dizziness    Vitals: Orthostatic 
[facility]  OCCUPATIONAL THERAPY MISSED TREATMENT NOTE  STRZ MED SURG 8AB  8B-21/021-A      Date: 2024  Patient Name: Ronn Ramires        CSN: [id number]   : 1955  (68 y.o.)  Gender: male   Referring Practitioner: Behl, Ashita, MD  Diagnosis: syncope and collapse         REASON FOR MISSED TREATMENT: Patient Refused.  Pt reports he is not feeling well and can't even tolerate sitting up because he is so dizzy. Will check back next available date.                 
[facility]  INPATIENT PHYSICAL THERAPY  EVALUATION  STRZ MED SURG 8AB - 8B-21/021-A    Time In: 1600  Time Out: 1615  Timed Code Treatment Minutes: 0 Minutes  Minutes: 15          Date: 2024  Patient Name: Ronn Ramires,  Gender:  male        MRN: [id number]  : 1955  (68 y.o.)      Referring Practitioner: Behl, Ashita, MD     Additional Pertinent Hx: Ronn Ramires is a 68 y.o. male with a history of alcohol abuse, cocaine abuse, cardiomyopathy, hep C who presents for evaluation of lethargy, nausea, diarrhea.  Patient was brought in by Police Department after being found slumped over on a porch.  Patient also recently experiencing homelessness.  He was hypothermic here to 93.7 rectal despite having multiple layers of clothes on.  He does describe testing positive for COVID several weeks ago.  Here in the ED, he immediately goes to bedside commode for significant diarrhea.  He describes continued diarrhea, difficulty taking deep breaths, dizziness, frontal headache.  He describes that he only drinks 1 can of beer a day max.  He states that he has a vodka but did not drink any today.  Has been in alcohol withdrawal previously.     Restrictions/Precautions:  Restrictions/Precautions: Fall Risk, General Precautions  Position Activity Restriction  Other position/activity restrictions: dizziness and has had positive orthostatic BP    Subjective:  Chart Reviewed: Yes  Patient assessed for rehabilitation services?: Yes  Subjective: Pt resting in bed upon arrival to room. Pt initially was not interested in participating, but then reported needing to get to toilet. Pt agreed to mobility assessment with his movement to the bathroom and back.    General:     Vision: Within Functional Limits  Hearing: Within functional limits       Pain: no # given Pt reports having more dizziness than pain.    Vitals: Vitals not assessed per clinical judgement, see nursing flowsheet    Social/Functional 
[facility]  SPEECH THERAPY  STRZ MED SURG 8AB  Speech - Language - Cognitive Evaluation + Cognitive tx    SLP Individual Minutes  Time In: 1054  Time Out: 1112  Minutes: 18  Timed Code Treatment Minutes: 8 Minutes       Rcelga-Meukyunl-Yxxogeitg eval: 10 minutes   Cognitive tx: 8 minutes     Date: 2024  Patient Name: Ronn Ramires      CSN: [id number]   : 1955  (68 y.o.)  Gender: male   Referring Physician:  Ashita Behl, MD   Diagnosis: Syncope and collapse   Precautions: Fall Risk  History of Present Illness/Injury: Patient admitted with above diagnosis. Per chart review, \"68-year-old male with PMH of HTN, GERD, BPH, chronic hyponatremia presents to the ED today for a questionable syncopal episode.  He remembers sitting down on a porch outside and the next thing he remembers was being woken up by a .  He states he has had decreased p.o. intake and has had diarrhea when he takes his medications, he has tried Pepto-Bismol without any alleviation.  He also stated there was an episode of vomiting that he does not remember but he found it on his jacket upon arrival to the ED.  He also endorses right lower quadrant abdominal pain that does not radiate and is about the size of a quarter and only hurts when it is pressed upon.  Upon arrival to the ED his temperature was 93.7 which increased to 97.1 with the use of a Delmy hugger.  , creatinine 1.6, lactic acid 2.6, troponin 14, AST 71, TSH 4.560, alcohol level negative, blood cultures drawn, COVID and flu negative, CXR negative, CT head mildly dilated ventricular system, stable.\"     ST consulted to complete mcqgem-cwigjqbt-nbkoljvum evaluation to assess current cognitive-linguistic skills and update POC as clinically indicated.     Past Medical History:   Diagnosis Date    Asthma     Cerebral artery occlusion with cerebral infarction (HCC)     Chronic kidney disease     Community acquired pneumonia 2024    Diarrhea  
Patient agitated and upset about enema orders. Patient states \" I am not doing another enema. This will be my 5th one and I refuse.\"  
Patient educated on how to use incentive spirometer. Patient verbalized understanding and demonstrated proper use. Emphasized importance and usage of device, with coughing and deep breathing every 4 hours while awake.       
Patient educated on the use of the incentive spirometer and its use at the right intervals. Patient needs encouragement as he doesn't us the incentive spirometer consistently  
Problem: Discharge Planning  Goal: Discharge to home or other facility with appropriate resources  Outcome: Progressing  Note: Pt will discharge home when appropriate.        Problem: Pain  Goal: Verbalizes/displays adequate comfort level or baseline comfort level  Outcome: Progressing  Note: Pt denies pain on my shift. Non pharmaceutical interventions like ice and repositioning offered before pain medications. Will continue to assess.        Problem: Safety - Adult  Goal: Free from fall injury  Outcome: Progressing  Note: Fall precaution in place. Bed alarm/chair alarm. Bed locked in lowest position. Fall band applied if applicable. Call light and overhead table within reach. Will continue to monitor.        Problem: Chronic Conditions and Co-morbidities  Goal: Patient's chronic conditions and co-morbidity symptoms are monitored and maintained or improved  Outcome: Progressing  Note: Pt monitored and assessed on my shift      Pt verbalizes understanding of care plan. Pt contributes to goal settings.                  
This nurse entered the pt's room to give care, while taking the pt's vitals discussed what medications were due at that time; Pt stated, \"I already took that\" then shook a pill bottle at this nurse; This nurse looked at the pill bottle which was the medication due with 2100 medication administration time; With not knowing whether pt took medication or not, this nurse explained medication protocol with locking medications in medication drawer and cleared bedside table of all and any medications sitting out; Pt verbalized frustration but this nurse explained it is for safety & the pt's well being; Pt still verbalized dissatisfaction but this nurse reassured the pt medications will be returned at time of discharge.   
           Check BMP tomorrow    Miralax BID   Milk of magnesium today    Patient refused laxative yesterday because he has been complaining of diarrhea however we believe this is overflow from constipation found on CT scan, talked to patient and he seemed agreeable to resume his MiraLAX     Chronic Hyponatremia: 128 on arrival, chronically low, around baseline.   Continue IVF.  Recheck BMP tomorrow.     Hx cocaine use: History will order UDS     GERD: Patient takes Protonix however he has been out.  Will restart  HTN:  Patient takes amlodipine and doxazosin. Patient states his medication he takes at night gives him diarrhea (the doxazosin) will discontinue it and switch to   Hx Hep C: unclear if treated or not. Hep c antibody positive on arrival. Labs in process.    Hypothermia: resolved Rectal temperature 93.7 on arrival. Increased to 97.1 after bear hugger applied. He was brought in by police after being found slumped over on a porch outside.  Elevated Troponin: resolved Troponins elevated at 14 on arrival, trended down.  Denies chest pain. Could be due to his fall as he states it is worse during inspiration.  Lactic Acidosis: resolved 2.6 on arrival, given 2 L bolus.  Likely due to hypovolemia. Blood cultures drawn in the ED.  MICKY: resolved Creatinine 1.6 on arrival, baseline 0.6.  Likely secondary to diarrhea and decreased oral intake.        Disposition:   [x] Home  [] Inpatient Rehab  [] Psychiatric Unit  [] SNF  [] Long Term Care Facility  [] Other-    ===================================================================      Chief Complaint: Syncopal episode    Hospital Course:    68-year-old male with PMH of HTN, GERD, BPH, chronic hyponatremia presents to the ED today for a questionable syncopal episode.  He remembers sitting down on a porch outside and the next thing he remembers was being woken up by a .  He states he has had decreased p.o. intake and has had diarrhea when he takes his 
arrival, trended down.  Denies chest pain. Could be due to his fall as he states it is worse during inspiration.  Lactic Acidosis: resolved 2.6 on arrival, given 2 L bolus.  Likely due to hypovolemia. Blood cultures drawn in the ED      Disposition:   [x] Home  [] Inpatient Rehab  [] Psychiatric Unit  [] SNF  [] Long Term Care Facility  [] Other-    ===================================================================      Chief Complaint: Syncopal episode    Hospital Course:    68-year-old male with PMH of HTN, GERD, BPH, chronic hyponatremia presents to the ED today for a questionable syncopal episode.  He remembers sitting down on a porch outside and the next thing he remembers was being woken up by a .  He states he has had decreased p.o. intake and has had diarrhea when he takes his medications, he has tried Pepto-Bismol without any alleviation.  He also stated there was an episode of vomiting that he does not remember but he found it on his jacket upon arrival to the ED.  He also endorses right lower quadrant abdominal pain that does not radiate and is about the size of a quarter and only hurts when it is pressed upon.  Upon arrival to the ED his temperature was 93.7 which increased to 97.1 with the use of a Delmy hugger.  , creatinine 1.6, lactic acid 2.6, troponin 14, AST 71, TSH 4.560, alcohol level negative, blood cultures drawn, COVID and flu negative, CXR negative, CT head mildly dilated ventricular system, stable.      Subjective (past 24 hours):   Seen and examined at bedside today, upon walking into the room he was seen vomiting up his breakfast.  Patient states this was due to he was very hungry and he believes he ate too fast, denies hematemesis.  He states he has had some black spots in his stool, will obtain FOBT.  Patient's heart rate was sinus tach he as seen on the monitor, associated with his shortness of breath concern for PE.  Associated with his abdominal pain and diarrhea 
tolerate 5 min functional standing with (S) to increase activity tolerance for toileting and grooming.  Short Term Goal 2: patient will functionally ambulate house hold distances with (S).  Short Term Goal 3: patient will complete ADL routine with MOD I and 0-1 cues for safety and sequencing.  Short Term Goal 4: patient will demo >/= 4+/5 (B) UE strength to increase ease with functional transfers.    AM-[facility] Inpatient Daily Activity Raw Score: 19  AM-[facility] Inpatient ADL T-Scale Score : 40.22    Following session, patient left in safe position with all fall risk precautions in place.

## 2024-01-16 NOTE — DISCHARGE SUMMARY
4. Interval, but old appearing, hemorrhagic infarct in the head of the caudate nucleus on the left.               **This report has been created using voice recognition software. It may contain minor errors which are inherent in voice recognition technology.**         Final report electronically signed by Dr. Chrissie Brothers on 1/8/2024 2:01 PM      XR ABDOMEN (KUB) (SINGLE AP VIEW)   Final Result   Constipation. No acute findings.            **This report has been created using voice recognition software.  It may contain minor errors which are inherent in voice recognition technology.**         Final report electronically signed by Dr. Evin De Jesus on 1/8/2024 1:10 PM      CTA CHEST W WO CONTRAST   Final Result   1. No filling defects are visualized within the pulmonary arterial vasculature to suggest the presence of pulmonary embolus. Moderate consolidation in the right upper lobe posteriorly and mild consolidation at the lung bases suggest pneumonia in the    appropriate clinical setting. Follow-up to ensure resolution is advised.      2. Normal appendix. No bowel obstruction. Mild residual fecal material seen throughout the colon suggesting fecal stasis.      3. Prostatomegaly. Correlate with PSA levels. Bladder wall thickening may in part be artifactual related to underdistention. Correlate with urinalysis. Numerous chronic findings are discussed.            **This report has been created using voice recognition software.  It may contain minor errors which are inherent in voice recognition technology.**      Final report electronically signed by Dr Carmela Baez on 1/4/2024 1:22 PM      CT ABDOMEN PELVIS W IV CONTRAST Additional Contrast? None   Final Result   1. No filling defects are visualized within the pulmonary arterial vasculature to suggest the presence of pulmonary embolus. Moderate consolidation in the right upper lobe posteriorly and mild consolidation at the lung bases suggest pneumonia in the

## 2024-01-16 NOTE — PLAN OF CARE
Problem: Discharge Planning  Goal: Discharge to home or other facility with appropriate resources  1/10/2024 0050 by Kala King RN  Outcome: Progressing  1/9/2024 1201 by Mary Allen RN  Outcome: Progressing  Flowsheets (Taken 1/9/2024 1201)  Discharge to home or other facility with appropriate resources: Identify barriers to discharge with patient and caregiver  Note: Awaiting therapy evals for possible ECF placement. Social work following.      Problem: Pain  Goal: Verbalizes/displays adequate comfort level or baseline comfort level  1/10/2024 0050 by Kala King RN  Outcome: Progressing  1/9/2024 1201 by Mary Allen RN  Outcome: Progressing  Flowsheets (Taken 1/9/2024 1201)  Verbalizes/displays adequate comfort level or baseline comfort level: Encourage patient to monitor pain and request assistance  Note: Patient with complaints of headche. PRN Tylenol given per patient request. Encouraging non pharmacologic interventions for better pain control.        Problem: Safety - Adult  Goal: Free from fall injury  1/10/2024 0050 by Kala King RN  Outcome: Progressing  1/9/2024 1201 by Mary Allen RN  Outcome: Progressing  Flowsheets (Taken 1/9/2024 1201)  Free From Fall Injury: Instruct family/caregiver on patient safety  Note: No falls this shift. Fall precautions in place and alarms in use. Patient impulsive at times.        Problem: Chronic Conditions and Co-morbidities  Goal: Patient's chronic conditions and co-morbidity symptoms are monitored and maintained or improved  1/10/2024 0050 by Kala King RN  Outcome: Progressing  1/9/2024 1201 by Mary Allen RN  Outcome: Progressing  Flowsheets (Taken 1/9/2024 1201)  Care Plan - Patient's Chronic Conditions and Co-Morbidity Symptoms are Monitored and Maintained or Improved: Monitor and assess patient's chronic conditions and comorbid symptoms for stability, deterioration, or improvement  Note: Chronic conditions and co 
  Problem: Discharge Planning  Goal: Discharge to home or other facility with appropriate resources  Outcome: Progressing     Problem: Pain  Goal: Verbalizes/displays adequate comfort level or baseline comfort level  Outcome: Progressing     Problem: Safety - Adult  Goal: Free from fall injury  Outcome: Progressing     Problem: Chronic Conditions and Co-morbidities  Goal: Patient's chronic conditions and co-morbidity symptoms are monitored and maintained or improved  Outcome: Progressing     Problem: Respiratory - Adult  Goal: Clear lung sounds  Outcome: Progressing     Problem: Musculoskeletal - Adult  Goal: Return mobility to safest level of function  Outcome: Progressing     Problem: Cardiovascular - Adult  Goal: Maintains optimal cardiac output and hemodynamic stability  Outcome: Progressing     
  Problem: Discharge Planning  Goal: Discharge to home or other facility with appropriate resources  Outcome: Progressing     Problem: Pain  Goal: Verbalizes/displays adequate comfort level or baseline comfort level  Outcome: Progressing     Problem: Safety - Adult  Goal: Free from fall injury  Outcome: Progressing     Problem: Chronic Conditions and Co-morbidities  Goal: Patient's chronic conditions and co-morbidity symptoms are monitored and maintained or improved  Outcome: Progressing     Problem: Respiratory - Adult  Goal: Clear lung sounds  Outcome: Progressing     Problem: Musculoskeletal - Adult  Goal: Return mobility to safest level of function  Outcome: Progressing     Problem: Cardiovascular - Adult  Goal: Maintains optimal cardiac output and hemodynamic stability  Outcome: Progressing     Problem: Skin/Tissue Integrity  Goal: Absence of new skin breakdown  Description: 1.  Monitor for areas of redness and/or skin breakdown  2.  Assess vascular access sites hourly  3.  Every 4-6 hours minimum:  Change oxygen saturation probe site  4.  Every 4-6 hours:  If on nasal continuous positive airway pressure, respiratory therapy assess nares and determine need for appliance change or resting period.  Outcome: Progressing     
  Problem: Discharge Planning  Goal: Discharge to home or other facility with appropriate resources  Outcome: Progressing     Problem: Pain  Goal: Verbalizes/displays adequate comfort level or baseline comfort level  Outcome: Progressing     Problem: Safety - Adult  Goal: Free from fall injury  Outcome: Progressing     Problem: Chronic Conditions and Co-morbidities  Goal: Patient's chronic conditions and co-morbidity symptoms are monitored and maintained or improved  Outcome: Progressing     Problem: Respiratory - Adult  Goal: Clear lung sounds  Outcome: Progressing     Problem: Musculoskeletal - Adult  Goal: Return mobility to safest level of function  Outcome: Progressing     Problem: Cardiovascular - Adult  Goal: Maintains optimal cardiac output and hemodynamic stability  Outcome: Progressing     Problem: Skin/Tissue Integrity  Goal: Absence of new skin breakdown  Description: 1.  Monitor for areas of redness and/or skin breakdown  2.  Assess vascular access sites hourly  3.  Every 4-6 hours minimum:  Change oxygen saturation probe site  4.  Every 4-6 hours:  If on nasal continuous positive airway pressure, respiratory therapy assess nares and determine need for appliance change or resting period.  Outcome: Progressing     
  Problem: Discharge Planning  Goal: Discharge to home or other facility with appropriate resources  Outcome: Progressing  Flowsheets (Taken 1/7/2024 2212)  Discharge to home or other facility with appropriate resources:   Identify barriers to discharge with patient and caregiver   Arrange for needed discharge resources and transportation as appropriate   Identify discharge learning needs (meds, wound care, etc)   Arrange for interpreters to assist at discharge as needed   Refer to discharge planning if patient needs post-hospital services based on physician order or complex needs related to functional status, cognitive ability or social support system     Problem: Pain  Goal: Verbalizes/displays adequate comfort level or baseline comfort level  Outcome: Progressing  Flowsheets (Taken 1/7/2024 2212)  Verbalizes/displays adequate comfort level or baseline comfort level:   Encourage patient to monitor pain and request assistance   Assess pain using appropriate pain scale   Administer analgesics based on type and severity of pain and evaluate response   Implement non-pharmacological measures as appropriate and evaluate response   Consider cultural and social influences on pain and pain management   Notify Licensed Independent Practitioner if interventions unsuccessful or patient reports new pain     Problem: Safety - Adult  Goal: Free from fall injury  Outcome: Progressing  Flowsheets (Taken 1/7/2024 2212)  Free From Fall Injury:   Instruct family/caregiver on patient safety   Based on caregiver fall risk screen, instruct family/caregiver to ask for assistance with transferring infant if caregiver noted to have fall risk factors     Problem: Chronic Conditions and Co-morbidities  Goal: Patient's chronic conditions and co-morbidity symptoms are monitored and maintained or improved  Outcome: Progressing  Flowsheets (Taken 1/7/2024 2212)  Care Plan - Patient's Chronic Conditions and Co-Morbidity Symptoms are Monitored and 
  Problem: Discharge Planning  Goal: Discharge to home or other facility with appropriate resources  Outcome: Progressing  Flowsheets (Taken 1/9/2024 1201)  Discharge to home or other facility with appropriate resources: Identify barriers to discharge with patient and caregiver  Note: Awaiting therapy evals for possible ECF placement. Social work following.      Problem: Pain  Goal: Verbalizes/displays adequate comfort level or baseline comfort level  Outcome: Progressing  Flowsheets (Taken 1/9/2024 1201)  Verbalizes/displays adequate comfort level or baseline comfort level: Encourage patient to monitor pain and request assistance  Note: Patient with complaints of headche. PRN Tylenol given per patient request. Encouraging non pharmacologic interventions for better pain control.        Problem: Safety - Adult  Goal: Free from fall injury  Outcome: Progressing  Flowsheets (Taken 1/9/2024 1201)  Free From Fall Injury: Instruct family/caregiver on patient safety  Note: No falls this shift. Fall precautions in place and alarms in use. Patient impulsive at times.        Problem: Chronic Conditions and Co-morbidities  Goal: Patient's chronic conditions and co-morbidity symptoms are monitored and maintained or improved  Outcome: Progressing  Flowsheets (Taken 1/9/2024 1201)  Care Plan - Patient's Chronic Conditions and Co-Morbidity Symptoms are Monitored and Maintained or Improved: Monitor and assess patient's chronic conditions and comorbid symptoms for stability, deterioration, or improvement  Note: Chronic conditions and co morbidities managed.      Problem: Musculoskeletal - Adult  Goal: Return mobility to safest level of function  Outcome: Progressing  Flowsheets (Taken 1/9/2024 1201)  Return Mobility to Safest Level of Function: Assess patient stability and activity tolerance for standing, transferring and ambulating with or without assistive devices  Note: Patient up with 1 assist. PT following.      Problem: 
  Problem: Discharge Planning  Goal: Discharge to home or other facility with appropriate resources  Outcome: Progressing  Note: Pt will discharge home when appropriate.       Problem: Pain  Goal: Verbalizes/displays adequate comfort level or baseline comfort level  Outcome: Progressing  Note: Pt denies pain on my shift. Non pharmaceutical interventions like ice and repositioning offered before pain medications. Will continue to assess.       Problem: Safety - Adult  Goal: Free from fall injury  Outcome: Progressing  Note: Fall precaution in place. Bed alarm/chair alarm. Bed locked in lowest position. Fall band applied if applicable. Call light and overhead table within reach. Will continue to monitor.       Problem: Chronic Conditions and Co-morbidities  Goal: Patient's chronic conditions and co-morbidity symptoms are monitored and maintained or improved  Outcome: Progressing  Note: Pt monitored and assessed on my shift     Pt verbalizes understanding of care plan. Pt contributes to goal settings.    
  Problem: Pain  Goal: Verbalizes/displays adequate comfort level or baseline comfort level  1/4/2024 0959 by Apurva Nolan RN  Outcome: Progressing  1/4/2024 0024 by Luna Dumont RN  Outcome: Progressing  Note: Patient assessed for pain this shift. Patient remains pain free.   1/4/2024 0023 by Luna Dumont RN  Outcome: Progressing  Flowsheets (Taken 1/3/2024 2023)  Verbalizes/displays adequate comfort level or baseline comfort level: Encourage patient to monitor pain and request assistance     Problem: Safety - Adult  Goal: Free from fall injury  Outcome: Progressing     
  Problem: Respiratory - Adult  Goal: Clear lung sounds  Outcome: Progressing   Continue breathing medication to improve aeration of lungs. Patient mutually agreed on goals.    
 Pt. Verbalizes understanding of care plan. Patient contributes to goal settings.    
Consult received see sw note dated 1/5/2024.  
devices  Note: Patient up with standby assist. Gait steady. Patient does complain of dizziness when moving.      Problem: Cardiovascular - Adult  Goal: Maintains optimal cardiac output and hemodynamic stability  Outcome: Progressing  Flowsheets (Taken 1/6/2024 1017)  Maintains optimal cardiac output and hemodynamic stability: Monitor blood pressure and heart rate  Note: BP running higher, meds given as ordered. Orthostatic vitals checked every shift, negative this AM.      Care plan reviewed with patient.  Patient verbalizes understanding of the plan of care and contributes to goal setting.

## 2024-01-16 NOTE — CARE COORDINATION
1/16/24, 10:25 AM EST    DISCHARGE ON GOING EVALUATION    [facility] day: 13  Location: -21/021-A Reason for admit: Syncope and collapse [R55]  Dehydration [E86.0]  Dizziness [R42]  Diarrhea, unspecified type [R19.7]   Procedure: No  Barriers to Discharge: Awaiting precert for admission to [facility]. Na+ today 129. Fluid restrictions placed.  PCP: Camilo Guadalupe MD  Readmission Risk Score: 16.8%  Patient Goals/Plan/Treatment Preferences: SW following. Await pre-cert.

## 2024-01-17 ENCOUNTER — TELEPHONE (OUTPATIENT)
Dept: INTERNAL MEDICINE CLINIC | Age: 69
End: 2024-01-17

## 2024-01-17 NOTE — TELEPHONE ENCOUNTER
Attempted to contact patient to move up his hospital follow up appointment to 7 days from discharge.  Was unable to contact him at the numbers listed in his chart, [facility] states he is not in their system and the patient contact listed has a number that is no longer in service.

## 2024-01-31 ENCOUNTER — OFFICE VISIT (OUTPATIENT)
Dept: INTERNAL MEDICINE CLINIC | Age: 69
End: 2024-01-31
Payer: MEDICARE

## 2024-01-31 VITALS
HEART RATE: 68 BPM | SYSTOLIC BLOOD PRESSURE: 130 MMHG | TEMPERATURE: 97.7 F | DIASTOLIC BLOOD PRESSURE: 72 MMHG | HEIGHT: 68 IN | BODY MASS INDEX: 21.89 KG/M2 | WEIGHT: 144.4 LBS

## 2024-01-31 DIAGNOSIS — R19.7 DIARRHEA, UNSPECIFIED TYPE: ICD-10-CM

## 2024-01-31 DIAGNOSIS — B18.2 CHRONIC HEPATITIS C WITHOUT HEPATIC COMA (HCC): ICD-10-CM

## 2024-01-31 DIAGNOSIS — Z87.898 HX OF SYNCOPE: ICD-10-CM

## 2024-01-31 DIAGNOSIS — Z09 HOSPITAL DISCHARGE FOLLOW-UP: Primary | ICD-10-CM

## 2024-01-31 DIAGNOSIS — J30.2 SEASONAL ALLERGIES: ICD-10-CM

## 2024-01-31 DIAGNOSIS — Z87.898 HISTORY OF ALCOHOL USE DISORDER: ICD-10-CM

## 2024-01-31 DIAGNOSIS — I10 PRIMARY HYPERTENSION: ICD-10-CM

## 2024-01-31 DIAGNOSIS — K21.9 GASTROESOPHAGEAL REFLUX DISEASE WITHOUT ESOPHAGITIS: ICD-10-CM

## 2024-01-31 PROCEDURE — 4004F PT TOBACCO SCREEN RCVD TLK: CPT | Performed by: STUDENT IN AN ORGANIZED HEALTH CARE EDUCATION/TRAINING PROGRAM

## 2024-01-31 PROCEDURE — 99214 OFFICE O/P EST MOD 30 MIN: CPT | Performed by: STUDENT IN AN ORGANIZED HEALTH CARE EDUCATION/TRAINING PROGRAM

## 2024-01-31 PROCEDURE — G8420 CALC BMI NORM PARAMETERS: HCPCS | Performed by: STUDENT IN AN ORGANIZED HEALTH CARE EDUCATION/TRAINING PROGRAM

## 2024-01-31 PROCEDURE — G8484 FLU IMMUNIZE NO ADMIN: HCPCS | Performed by: STUDENT IN AN ORGANIZED HEALTH CARE EDUCATION/TRAINING PROGRAM

## 2024-01-31 PROCEDURE — 1111F DSCHRG MED/CURRENT MED MERGE: CPT | Performed by: STUDENT IN AN ORGANIZED HEALTH CARE EDUCATION/TRAINING PROGRAM

## 2024-01-31 PROCEDURE — 3075F SYST BP GE 130 - 139MM HG: CPT | Performed by: STUDENT IN AN ORGANIZED HEALTH CARE EDUCATION/TRAINING PROGRAM

## 2024-01-31 PROCEDURE — 3017F COLORECTAL CA SCREEN DOC REV: CPT | Performed by: STUDENT IN AN ORGANIZED HEALTH CARE EDUCATION/TRAINING PROGRAM

## 2024-01-31 PROCEDURE — 3078F DIAST BP <80 MM HG: CPT | Performed by: STUDENT IN AN ORGANIZED HEALTH CARE EDUCATION/TRAINING PROGRAM

## 2024-01-31 PROCEDURE — 1123F ACP DISCUSS/DSCN MKR DOCD: CPT | Performed by: STUDENT IN AN ORGANIZED HEALTH CARE EDUCATION/TRAINING PROGRAM

## 2024-01-31 PROCEDURE — G8427 DOCREV CUR MEDS BY ELIG CLIN: HCPCS | Performed by: STUDENT IN AN ORGANIZED HEALTH CARE EDUCATION/TRAINING PROGRAM

## 2024-01-31 RX ORDER — ONDANSETRON 4 MG/1
4 TABLET, ORALLY DISINTEGRATING ORAL EVERY 8 HOURS PRN
Qty: 21 TABLET | Refills: 0 | Status: SHIPPED | OUTPATIENT
Start: 2024-01-31

## 2024-01-31 RX ORDER — PANTOPRAZOLE SODIUM 40 MG/1
40 TABLET, DELAYED RELEASE ORAL
Qty: 30 TABLET | Refills: 5 | Status: SHIPPED | OUTPATIENT
Start: 2024-01-31

## 2024-01-31 RX ORDER — AMLODIPINE BESYLATE 10 MG/1
10 TABLET ORAL DAILY
Qty: 30 TABLET | Refills: 5 | Status: SHIPPED | OUTPATIENT
Start: 2024-01-31

## 2024-01-31 RX ORDER — DOXAZOSIN 2 MG/1
2 TABLET ORAL NIGHTLY
Qty: 30 TABLET | Refills: 5 | Status: SHIPPED | OUTPATIENT
Start: 2024-01-31

## 2024-01-31 RX ORDER — FLUTICASONE PROPIONATE 50 MCG
1 SPRAY, SUSPENSION (ML) NASAL DAILY
Qty: 16 G | Refills: 5 | Status: SHIPPED | OUTPATIENT
Start: 2024-01-31

## 2024-01-31 ASSESSMENT — ENCOUNTER SYMPTOMS
COUGH: 0
DIARRHEA: 0
SORE THROAT: 0
RHINORRHEA: 0
BLOOD IN STOOL: 0
ABDOMINAL PAIN: 0
WHEEZING: 0
SHORTNESS OF BREATH: 0
VOMITING: 0

## 2024-01-31 ASSESSMENT — PATIENT HEALTH QUESTIONNAIRE - PHQ9
SUM OF ALL RESPONSES TO PHQ QUESTIONS 1-9: 2
1. LITTLE INTEREST OR PLEASURE IN DOING THINGS: 1
SUM OF ALL RESPONSES TO PHQ QUESTIONS 1-9: 2
SUM OF ALL RESPONSES TO PHQ9 QUESTIONS 1 & 2: 2
2. FEELING DOWN, DEPRESSED OR HOPELESS: 1

## 2024-01-31 NOTE — PROGRESS NOTES
Ronn Ramires (:  1955) is a 68 y.o. male,Established patient, here for evaluation of the following chief complaint(s):  Follow-Up from Hospital (D/c 24 - syncope , diarrhea , hyponatremia )       ASSESSMENT/PLAN:  1. Hospital discharge follow-up  2. Hx of syncope - Resolved  3. Diarrhea, unspecified type - Resolved  4. Chronic hepatitis C without hepatic coma (HCC)  -     HCV Ultra Quant (Viral Load); Future  5. Primary hypertension - Controlled  -     amLODIPine (NORVASC) 10 MG tablet; Take 1 tablet by mouth daily, Disp-30 tablet, R-5Normal  -     doxazosin (CARDURA) 2 MG tablet; Take 1 tablet by mouth nightly, Disp-30 tablet, R-5Normal  6. Seasonal allergies  -     fluticasone (FLONASE) 50 MCG/ACT nasal spray; 1 spray by Each Nostril route daily, Disp-16 g, R-5Normal  -     ondansetron (ZOFRAN-ODT) 4 MG disintegrating tablet; Take 1 tablet by mouth every 8 hours as needed for Nausea or Vomiting, Disp-21 tablet, R-0Normal  7. Gastroesophageal reflux disease without esophagitis - Controlled  -     pantoprazole (PROTONIX) 40 MG tablet; Take 1 tablet by mouth every morning (before breakfast), Disp-30 tablet, R-5Normal  8. History of alcohol use disorder    Hosp d/c f/u.  Labs/imaging reviewed. Chronic leukopenia/anemia. Chronic hyponatremia. Stable.  No further Syncopal episodes. Bowels have normalized  Encourage adequate food and water intake  HCV Viral load ordered  Meds refilled as above  Encourage slow movement with positional changes  Follow up in 4 weeks for routine visit    Return in about 4 weeks (around 2024).     Subjective   SUBJECTIVE/OBJECTIVE:  HPI  Ronn Ramires is a 68 y.o. male who  has a past medical history of Asthma, Cerebral artery occlusion with cerebral infarction (HCC), Chronic kidney disease, Community acquired pneumonia, Diarrhea, Hemodialysis patient (HCC), Hypertension, Liver disease, and Psychiatric problem.     He is here today for a hospital discharge

## 2024-02-09 LAB
HEPATITIS C RNA PCR QUANT: ABNORMAL IU/ML
HEPATITIS C RNA QUANT LOG: 5.42 LOG IU/ML

## 2024-02-13 ENCOUNTER — TELEPHONE (OUTPATIENT)
Dept: INTERNAL MEDICINE CLINIC | Age: 69
End: 2024-02-13

## 2024-02-13 DIAGNOSIS — B18.2 CHRONIC HEPATITIS C WITHOUT HEPATIC COMA (HCC): Primary | ICD-10-CM

## 2024-02-13 NOTE — TELEPHONE ENCOUNTER
I talked with william at [facility].   She had a wrong address and wrong phone number and will try what I have.    They do not treat hep c but will make sure they get referred to GI.  She will have pt call in if she gets ahold of him.      He has an appt here on 3/4/24 for follow up.   If I am able to make a connection does his appt need to be sooner?   Not sure about his living situation.      I also checked at [place] and he is not being seen there either.  Can't get through to [facility] as of yet.  Long wait times.

## 2024-02-13 NOTE — TELEPHONE ENCOUNTER
----- Message from Casper Sales MD sent at 2/10/2024  8:30 AM EST -----  See if [facility] or [facility] health or health dept treating this

## 2024-02-13 NOTE — TELEPHONE ENCOUNTER
I spoke with Dr. Giordano's office and they do not follow this pt And they are not taking any new hep c pts at this time.

## 2024-02-26 NOTE — TELEPHONE ENCOUNTER
Meghna from Health dept called back saying she was able to get ahold of pt through his caretaker Ina at [phone].  In regard to referral to GI, she is asking that it come from our office as that is how  [facility] prefers.  OK to refer for hep C?

## 2024-03-04 ENCOUNTER — OFFICE VISIT (OUTPATIENT)
Dept: INTERNAL MEDICINE CLINIC | Age: 69
End: 2024-03-04
Payer: MEDICARE

## 2024-03-04 VITALS
SYSTOLIC BLOOD PRESSURE: 130 MMHG | WEIGHT: 144.6 LBS | TEMPERATURE: 98.3 F | HEIGHT: 68 IN | BODY MASS INDEX: 21.92 KG/M2 | DIASTOLIC BLOOD PRESSURE: 74 MMHG | HEART RATE: 92 BPM

## 2024-03-04 DIAGNOSIS — B18.2 CHRONIC HEPATITIS C WITHOUT HEPATIC COMA (HCC): Primary | ICD-10-CM

## 2024-03-04 DIAGNOSIS — I10 PRIMARY HYPERTENSION: ICD-10-CM

## 2024-03-04 DIAGNOSIS — K21.9 GASTROESOPHAGEAL REFLUX DISEASE WITHOUT ESOPHAGITIS: ICD-10-CM

## 2024-03-04 PROCEDURE — G8420 CALC BMI NORM PARAMETERS: HCPCS

## 2024-03-04 PROCEDURE — 3078F DIAST BP <80 MM HG: CPT

## 2024-03-04 PROCEDURE — G8427 DOCREV CUR MEDS BY ELIG CLIN: HCPCS

## 2024-03-04 PROCEDURE — G8484 FLU IMMUNIZE NO ADMIN: HCPCS

## 2024-03-04 PROCEDURE — 99213 OFFICE O/P EST LOW 20 MIN: CPT

## 2024-03-04 PROCEDURE — 3075F SYST BP GE 130 - 139MM HG: CPT

## 2024-03-04 PROCEDURE — 3017F COLORECTAL CA SCREEN DOC REV: CPT

## 2024-03-04 PROCEDURE — 4004F PT TOBACCO SCREEN RCVD TLK: CPT

## 2024-03-04 PROCEDURE — 1123F ACP DISCUSS/DSCN MKR DOCD: CPT

## 2024-03-04 NOTE — PROGRESS NOTES
1955    Chief Complaint   Patient presents with    1 Month Follow-Up     F/u labs - hep C        HPI  68-year-old male past medical history of chronic hepatitis C first diagnosed back in 1970 as he stepped on a nail.  Presenting for 4-week follow-up routine visit regarding his hepatitis.  Lab work from last visit shows HCV RNA PCR quant 263,000, Hepatitis C RNA quant log 5.42.  Referral was made to [facility] for treatment of hepatitis C.      Past Medical History:   Diagnosis Date    Asthma     Cerebral artery occlusion with cerebral infarction (HCC)     Chronic kidney disease     Community acquired pneumonia 1/7/2024    Diarrhea     Hemodialysis patient (HCC)     previous    Hypertension     Liver disease     Psychiatric problem        Past Surgical History:   Procedure Laterality Date    COLONOSCOPY         Current Outpatient Medications   Medication Sig Dispense Refill    amLODIPine (NORVASC) 10 MG tablet Take 1 tablet by mouth daily 30 tablet 5    doxazosin (CARDURA) 2 MG tablet Take 1 tablet by mouth nightly 30 tablet 5    fluticasone (FLONASE) 50 MCG/ACT nasal spray 1 spray by Each Nostril route daily 16 g 5    pantoprazole (PROTONIX) 40 MG tablet Take 1 tablet by mouth every morning (before breakfast) 30 tablet 5    polyethylene glycol (GLYCOLAX) 17 g packet Take 1 packet by mouth daily as needed for Constipation (Patient not taking: Reported on 3/4/2024) 527 g 1    sennosides-docusate sodium (SENOKOT-S) 8.6-50 MG tablet Take 2 tablets by mouth daily (Patient not taking: Reported on 1/31/2024) 60 tablet 1     No current facility-administered medications for this visit.       Allergies   Allergen Reactions    Sulfa Antibiotics Anaphylaxis       Social History     Socioeconomic History    Marital status:      Spouse name: Not on file    Number of children: 0    Years of education: Not on file    Highest education level: Not on file   Occupational History    Not on file   Tobacco Use    Smoking

## 2024-04-12 ENCOUNTER — HOSPITAL ENCOUNTER (OUTPATIENT)
Age: 69
Setting detail: OBSERVATION
Discharge: HOME | End: 2024-04-13
Attending: EMERGENCY MEDICINE
Payer: MEDICARE

## 2024-04-12 ENCOUNTER — APPOINTMENT (OUTPATIENT)
Dept: CT IMAGING | Age: 69
End: 2024-04-12
Payer: MEDICARE

## 2024-04-12 DIAGNOSIS — R29.0 CARPOPEDAL SPASM: Primary | ICD-10-CM

## 2024-04-12 DIAGNOSIS — E86.0 DEHYDRATION: ICD-10-CM

## 2024-04-12 DIAGNOSIS — R19.7 DIARRHEA, UNSPECIFIED TYPE: ICD-10-CM

## 2024-04-12 DIAGNOSIS — E55.9 VITAMIN D DEFICIENCY: ICD-10-CM

## 2024-04-12 DIAGNOSIS — N17.9 AKI (ACUTE KIDNEY INJURY) (HCC): ICD-10-CM

## 2024-04-12 DIAGNOSIS — E83.51 HYPOCALCEMIA: ICD-10-CM

## 2024-04-12 LAB
25(OH)D3 SERPL-MCNC: 20 NG/ML (ref 30–100)
ALBUMIN SERPL BCG-MCNC: 4.4 G/DL (ref 3.5–5.1)
ALP SERPL-CCNC: 69 U/L (ref 38–126)
ALT SERPL W/O P-5'-P-CCNC: 39 U/L (ref 11–66)
AMPHETAMINES UR QL SCN: NEGATIVE
ANION GAP SERPL CALC-SCNC: 16 MEQ/L (ref 8–16)
AST SERPL-CCNC: 85 U/L (ref 5–40)
BACTERIA: NORMAL
BARBITURATES UR QL SCN: NEGATIVE
BASOPHILS ABSOLUTE: 0 THOU/MM3 (ref 0–0.1)
BASOPHILS NFR BLD AUTO: 0.6 %
BENZODIAZ UR QL SCN: NEGATIVE
BILIRUB CONJ SERPL-MCNC: < 0.2 MG/DL (ref 0–0.3)
BILIRUB SERPL-MCNC: 0.6 MG/DL (ref 0.3–1.2)
BILIRUB UR QL STRIP: NEGATIVE
BUN SERPL-MCNC: 16 MG/DL (ref 7–22)
BZE UR QL SCN: NEGATIVE
CA-I BLD ISE-SCNC: 1.04 MMOL/L (ref 1.12–1.32)
CALCIUM SERPL-MCNC: 8.8 MG/DL (ref 8.5–10.5)
CANNABINOIDS UR QL SCN: NEGATIVE
CASTS #/AREA URNS LPF: NORMAL /LPF
CASTS #/AREA URNS LPF: NORMAL /LPF
CHARACTER UR: CLEAR
CHARCOAL URNS QL MICRO: NORMAL
CHLORIDE SERPL-SCNC: 96 MEQ/L (ref 98–111)
CO2 SERPL-SCNC: 23 MEQ/L (ref 23–33)
COLOR UR: YELLOW
CREAT SERPL-MCNC: 1.5 MG/DL (ref 0.4–1.2)
CRYSTALS URNS QL MICRO: NORMAL
DEPRECATED RDW RBC AUTO: 49 FL (ref 35–45)
EOSINOPHIL NFR BLD AUTO: 0.3 %
EOSINOPHILS ABSOLUTE: 0 THOU/MM3 (ref 0–0.4)
EPITHELIAL CELLS, UA: NORMAL /HPF
ERYTHROCYTE [DISTWIDTH] IN BLOOD BY AUTOMATED COUNT: 13.3 % (ref 11.5–14.5)
ETHANOL SERPL-MCNC: 0.01 %
FENTANYL: NEGATIVE
GFR SERPL CREATININE-BSD FRML MDRD: 50 ML/MIN/1.73M2
GLUCOSE SERPL-MCNC: 77 MG/DL (ref 70–108)
GLUCOSE UR QL STRIP.AUTO: NEGATIVE MG/DL
HCT VFR BLD AUTO: 39.4 % (ref 42–52)
HGB BLD-MCNC: 13.4 GM/DL (ref 14–18)
HGB UR QL STRIP.AUTO: NEGATIVE
IMM GRANULOCYTES # BLD AUTO: 0.02 THOU/MM3 (ref 0–0.07)
IMM GRANULOCYTES NFR BLD AUTO: 0.6 %
KETONES UR QL STRIP.AUTO: NEGATIVE
LEUKOCYTE ESTERASE UR QL STRIP.AUTO: NEGATIVE
LIPASE SERPL-CCNC: 26.5 U/L (ref 5.6–51.3)
LYMPHOCYTES ABSOLUTE: 1 THOU/MM3 (ref 1–4.8)
LYMPHOCYTES NFR BLD AUTO: 29.4 %
MAGNESIUM SERPL-MCNC: 1.8 MG/DL (ref 1.6–2.4)
MCH RBC QN AUTO: 34.4 PG (ref 26–33)
MCHC RBC AUTO-ENTMCNC: 34 GM/DL (ref 32.2–35.5)
MCV RBC AUTO: 101.3 FL (ref 80–94)
MONOCYTES ABSOLUTE: 0.5 THOU/MM3 (ref 0.4–1.3)
MONOCYTES NFR BLD AUTO: 14 %
NEUTROPHILS NFR BLD AUTO: 55.1 %
NITRITE UR QL STRIP.AUTO: NEGATIVE
NRBC BLD AUTO-RTO: 0 /100 WBC
OPIATES UR QL SCN: NEGATIVE
OSMOLALITY SERPL CALC.SUM OF ELEC: 270.1 MOSMOL/KG (ref 275–300)
OXYCODONE: NEGATIVE
PCP UR QL SCN: NEGATIVE
PH UR STRIP.AUTO: 6.5 [PH] (ref 5–9)
PHOSPHATE SERPL-MCNC: 3.7 MG/DL (ref 2.4–4.7)
PLATELET # BLD AUTO: 178 THOU/MM3 (ref 130–400)
PMV BLD AUTO: 9.9 FL (ref 9.4–12.4)
POTASSIUM SERPL-SCNC: 4.1 MEQ/L (ref 3.5–5.2)
PROT SERPL-MCNC: 8.1 G/DL (ref 6.1–8)
PROT UR STRIP.AUTO-MCNC: NEGATIVE MG/DL
PTH-INTACT SERPL-MCNC: 101.8 PG/ML (ref 15–65)
RBC # BLD AUTO: 3.89 MILL/MM3 (ref 4.7–6.1)
RBC #/AREA URNS HPF: NORMAL /HPF
RENAL EPI CELLS #/AREA URNS HPF: NORMAL /[HPF]
SEGMENTED NEUTROPHILS ABSOLUTE COUNT: 1.9 THOU/MM3 (ref 1.8–7.7)
SODIUM SERPL-SCNC: 135 MEQ/L (ref 135–145)
SPECIFIC GRAVITY UA: 1.01 (ref 1–1.03)
TROPONIN, HIGH SENSITIVITY: 13 NG/L (ref 0–12)
UROBILINOGEN, URINE: 0.2 EU/DL (ref 0–1)
WBC # BLD AUTO: 3.4 THOU/MM3 (ref 4.8–10.8)
WBC #/AREA URNS HPF: NORMAL /HPF
YEAST LIKE FUNGI URNS QL MICRO: NORMAL

## 2024-04-12 PROCEDURE — 82306 VITAMIN D 25 HYDROXY: CPT

## 2024-04-12 PROCEDURE — G0378 HOSPITAL OBSERVATION PER HR: HCPCS

## 2024-04-12 PROCEDURE — 93005 ELECTROCARDIOGRAM TRACING: CPT | Performed by: STUDENT IN AN ORGANIZED HEALTH CARE EDUCATION/TRAINING PROGRAM

## 2024-04-12 PROCEDURE — 80307 DRUG TEST PRSMV CHEM ANLYZR: CPT

## 2024-04-12 PROCEDURE — 82077 ASSAY SPEC XCP UR&BREATH IA: CPT

## 2024-04-12 PROCEDURE — 96361 HYDRATE IV INFUSION ADD-ON: CPT

## 2024-04-12 PROCEDURE — 96365 THER/PROPH/DIAG IV INF INIT: CPT

## 2024-04-12 PROCEDURE — 99223 1ST HOSP IP/OBS HIGH 75: CPT | Performed by: PHYSICIAN ASSISTANT

## 2024-04-12 PROCEDURE — 84484 ASSAY OF TROPONIN QUANT: CPT

## 2024-04-12 PROCEDURE — 6370000000 HC RX 637 (ALT 250 FOR IP): Performed by: PHYSICIAN ASSISTANT

## 2024-04-12 PROCEDURE — 83690 ASSAY OF LIPASE: CPT

## 2024-04-12 PROCEDURE — 82248 BILIRUBIN DIRECT: CPT

## 2024-04-12 PROCEDURE — 96360 HYDRATION IV INFUSION INIT: CPT

## 2024-04-12 PROCEDURE — 84100 ASSAY OF PHOSPHORUS: CPT

## 2024-04-12 PROCEDURE — 81001 URINALYSIS AUTO W/SCOPE: CPT

## 2024-04-12 PROCEDURE — 99285 EMERGENCY DEPT VISIT HI MDM: CPT

## 2024-04-12 PROCEDURE — 85025 COMPLETE CBC W/AUTO DIFF WBC: CPT

## 2024-04-12 PROCEDURE — 96372 THER/PROPH/DIAG INJ SC/IM: CPT

## 2024-04-12 PROCEDURE — 36415 COLL VENOUS BLD VENIPUNCTURE: CPT

## 2024-04-12 PROCEDURE — 6360000002 HC RX W HCPCS: Performed by: PHYSICIAN ASSISTANT

## 2024-04-12 PROCEDURE — 2500000003 HC RX 250 WO HCPCS: Performed by: STUDENT IN AN ORGANIZED HEALTH CARE EDUCATION/TRAINING PROGRAM

## 2024-04-12 PROCEDURE — 74176 CT ABD & PELVIS W/O CONTRAST: CPT

## 2024-04-12 PROCEDURE — 2580000003 HC RX 258: Performed by: STUDENT IN AN ORGANIZED HEALTH CARE EDUCATION/TRAINING PROGRAM

## 2024-04-12 PROCEDURE — 83970 ASSAY OF PARATHORMONE: CPT

## 2024-04-12 PROCEDURE — 2580000003 HC RX 258: Performed by: PHYSICIAN ASSISTANT

## 2024-04-12 PROCEDURE — 83735 ASSAY OF MAGNESIUM: CPT

## 2024-04-12 PROCEDURE — 80053 COMPREHEN METABOLIC PANEL: CPT

## 2024-04-12 PROCEDURE — 82330 ASSAY OF CALCIUM: CPT

## 2024-04-12 RX ORDER — CALCIUM GLUCONATE 10 MG/ML
1000 INJECTION, SOLUTION INTRAVENOUS ONCE
Status: COMPLETED | OUTPATIENT
Start: 2024-04-12 | End: 2024-04-12

## 2024-04-12 RX ORDER — ONDANSETRON 2 MG/ML
4 INJECTION INTRAMUSCULAR; INTRAVENOUS EVERY 6 HOURS PRN
Status: DISCONTINUED | OUTPATIENT
Start: 2024-04-12 | End: 2024-04-13 | Stop reason: HOSPADM

## 2024-04-12 RX ORDER — DOXAZOSIN 2 MG/1
2 TABLET ORAL NIGHTLY
Status: DISCONTINUED | OUTPATIENT
Start: 2024-04-12 | End: 2024-04-13 | Stop reason: HOSPADM

## 2024-04-12 RX ORDER — SODIUM CHLORIDE 9 MG/ML
INJECTION, SOLUTION INTRAVENOUS PRN
Status: DISCONTINUED | OUTPATIENT
Start: 2024-04-12 | End: 2024-04-13 | Stop reason: HOSPADM

## 2024-04-12 RX ORDER — POLYETHYLENE GLYCOL 3350 17 G/17G
17 POWDER, FOR SOLUTION ORAL DAILY PRN
Status: DISCONTINUED | OUTPATIENT
Start: 2024-04-12 | End: 2024-04-13 | Stop reason: HOSPADM

## 2024-04-12 RX ORDER — ACETAMINOPHEN 650 MG/1
650 SUPPOSITORY RECTAL EVERY 6 HOURS PRN
Status: DISCONTINUED | OUTPATIENT
Start: 2024-04-12 | End: 2024-04-13 | Stop reason: HOSPADM

## 2024-04-12 RX ORDER — SODIUM CHLORIDE 0.9 % (FLUSH) 0.9 %
5-40 SYRINGE (ML) INJECTION PRN
Status: DISCONTINUED | OUTPATIENT
Start: 2024-04-12 | End: 2024-04-13 | Stop reason: HOSPADM

## 2024-04-12 RX ORDER — ONDANSETRON 4 MG/1
4 TABLET, ORALLY DISINTEGRATING ORAL EVERY 8 HOURS PRN
Status: DISCONTINUED | OUTPATIENT
Start: 2024-04-12 | End: 2024-04-13 | Stop reason: HOSPADM

## 2024-04-12 RX ORDER — SODIUM CHLORIDE 0.9 % (FLUSH) 0.9 %
5-40 SYRINGE (ML) INJECTION EVERY 12 HOURS SCHEDULED
Status: DISCONTINUED | OUTPATIENT
Start: 2024-04-12 | End: 2024-04-13 | Stop reason: HOSPADM

## 2024-04-12 RX ORDER — PANTOPRAZOLE SODIUM 40 MG/1
40 TABLET, DELAYED RELEASE ORAL
Status: DISCONTINUED | OUTPATIENT
Start: 2024-04-13 | End: 2024-04-13 | Stop reason: HOSPADM

## 2024-04-12 RX ORDER — MAGNESIUM SULFATE IN WATER 40 MG/ML
2000 INJECTION, SOLUTION INTRAVENOUS PRN
Status: DISCONTINUED | OUTPATIENT
Start: 2024-04-12 | End: 2024-04-13 | Stop reason: HOSPADM

## 2024-04-12 RX ORDER — AMLODIPINE BESYLATE 10 MG/1
10 TABLET ORAL DAILY
Status: DISCONTINUED | OUTPATIENT
Start: 2024-04-13 | End: 2024-04-13 | Stop reason: HOSPADM

## 2024-04-12 RX ORDER — 0.9 % SODIUM CHLORIDE 0.9 %
1000 INTRAVENOUS SOLUTION INTRAVENOUS ONCE
Status: COMPLETED | OUTPATIENT
Start: 2024-04-12 | End: 2024-04-12

## 2024-04-12 RX ORDER — ENOXAPARIN SODIUM 100 MG/ML
40 INJECTION SUBCUTANEOUS DAILY
Status: DISCONTINUED | OUTPATIENT
Start: 2024-04-12 | End: 2024-04-13 | Stop reason: HOSPADM

## 2024-04-12 RX ORDER — LANOLIN ALCOHOL/MO/W.PET/CERES
100 CREAM (GRAM) TOPICAL DAILY
Status: DISCONTINUED | OUTPATIENT
Start: 2024-04-12 | End: 2024-04-13 | Stop reason: HOSPADM

## 2024-04-12 RX ORDER — LOPERAMIDE HYDROCHLORIDE 2 MG/1
2 CAPSULE ORAL 4 TIMES DAILY PRN
Status: DISCONTINUED | OUTPATIENT
Start: 2024-04-12 | End: 2024-04-13 | Stop reason: HOSPADM

## 2024-04-12 RX ORDER — ACETAMINOPHEN 325 MG/1
650 TABLET ORAL EVERY 6 HOURS PRN
Status: DISCONTINUED | OUTPATIENT
Start: 2024-04-12 | End: 2024-04-13 | Stop reason: HOSPADM

## 2024-04-12 RX ORDER — POTASSIUM CHLORIDE 7.45 MG/ML
10 INJECTION INTRAVENOUS PRN
Status: DISCONTINUED | OUTPATIENT
Start: 2024-04-12 | End: 2024-04-13 | Stop reason: HOSPADM

## 2024-04-12 RX ORDER — SODIUM CHLORIDE, SODIUM LACTATE, POTASSIUM CHLORIDE, CALCIUM CHLORIDE 600; 310; 30; 20 MG/100ML; MG/100ML; MG/100ML; MG/100ML
INJECTION, SOLUTION INTRAVENOUS CONTINUOUS
Status: DISCONTINUED | OUTPATIENT
Start: 2024-04-12 | End: 2024-04-13

## 2024-04-12 RX ORDER — POTASSIUM CHLORIDE 20 MEQ/1
40 TABLET, EXTENDED RELEASE ORAL PRN
Status: DISCONTINUED | OUTPATIENT
Start: 2024-04-12 | End: 2024-04-13 | Stop reason: HOSPADM

## 2024-04-12 RX ORDER — MULTIVITAMIN WITH IRON
1 TABLET ORAL DAILY
Status: DISCONTINUED | OUTPATIENT
Start: 2024-04-12 | End: 2024-04-13 | Stop reason: HOSPADM

## 2024-04-12 RX ORDER — SODIUM CHLORIDE 9 MG/ML
INJECTION, SOLUTION INTRAVENOUS PRN
Status: DISCONTINUED | OUTPATIENT
Start: 2024-04-12 | End: 2024-04-12 | Stop reason: SDUPTHER

## 2024-04-12 RX ADMIN — DOXAZOSIN 2 MG: 2 TABLET ORAL at 21:12

## 2024-04-12 RX ADMIN — ENOXAPARIN SODIUM 40 MG: 100 INJECTION SUBCUTANEOUS at 16:51

## 2024-04-12 RX ADMIN — SODIUM CHLORIDE 1000 ML: 9 INJECTION, SOLUTION INTRAVENOUS at 11:39

## 2024-04-12 RX ADMIN — Medication 100 MG: at 16:50

## 2024-04-12 RX ADMIN — SODIUM CHLORIDE 1000 ML: 9 INJECTION, SOLUTION INTRAVENOUS at 12:41

## 2024-04-12 RX ADMIN — CALCIUM GLUCONATE 1000 MG: 10 INJECTION, SOLUTION INTRAVENOUS at 14:10

## 2024-04-12 RX ADMIN — SODIUM CHLORIDE, POTASSIUM CHLORIDE, SODIUM LACTATE AND CALCIUM CHLORIDE: 600; 310; 30; 20 INJECTION, SOLUTION INTRAVENOUS at 18:15

## 2024-04-12 RX ADMIN — Medication 1 TABLET: at 16:50

## 2024-04-12 RX ADMIN — LOPERAMIDE HYDROCHLORIDE 2 MG: 2 CAPSULE ORAL at 18:16

## 2024-04-12 ASSESSMENT — PAIN DESCRIPTION - LOCATION
LOCATION: ARM
LOCATION: ARM
LOCATION: HEAD

## 2024-04-12 ASSESSMENT — PAIN DESCRIPTION - ORIENTATION
ORIENTATION: RIGHT;LEFT;UPPER
ORIENTATION: LEFT;RIGHT

## 2024-04-12 ASSESSMENT — PAIN SCALES - GENERAL
PAINLEVEL_OUTOF10: 2
PAINLEVEL_OUTOF10: 10
PAINLEVEL_OUTOF10: 4

## 2024-04-12 ASSESSMENT — PAIN DESCRIPTION - ONSET: ONSET: ON-GOING

## 2024-04-12 ASSESSMENT — LIFESTYLE VARIABLES
HOW MANY STANDARD DRINKS CONTAINING ALCOHOL DO YOU HAVE ON A TYPICAL DAY: 1 OR 2
HOW OFTEN DO YOU HAVE A DRINK CONTAINING ALCOHOL: 2-3 TIMES A WEEK

## 2024-04-12 ASSESSMENT — PAIN - FUNCTIONAL ASSESSMENT
PAIN_FUNCTIONAL_ASSESSMENT: 0-10
PAIN_FUNCTIONAL_ASSESSMENT: ACTIVITIES ARE NOT PREVENTED

## 2024-04-12 ASSESSMENT — PAIN DESCRIPTION - DESCRIPTORS: DESCRIPTORS: CRAMPING;SPASM

## 2024-04-12 ASSESSMENT — PAIN DESCRIPTION - FREQUENCY: FREQUENCY: CONTINUOUS

## 2024-04-12 NOTE — H&P
Hospitalist History & Physical    Patient:  Ronn Ramires    Unit/Bed:8B-36/036-A  YOB: 1955  MRN: [id number]   Acct: [id number]   PCP: Camilo Guadalupe MD  Code Status: Full Code    Date of Service: The patient was seen and examined on 04/12/24 and admitted to Observation with an expected length of stay of less than two midnights due to medical therapy.     Chief Complaint: Muscle spasms    Assessment/Plan:    Acute kidney injury  Creatinine 1.5, baseline 0.9-1.0.  Suspect secondary to diarrhea, vomiting, and possibly use of NSAIDs.  IV fluids.  Avoid nephrotoxins.  Counseled patient to avoid NSAIDs.  Hypocalcemia  Ionized calcium 1.04.  Received replacement emergency department.  Check PTH, vitamin D 25-hydroxy  Recheck ionized calcium in the morning.  Replace as needed.  Diarrhea  Patient reports chronic diarrhea for months.  Previously noted to have overflow diarrhea due to constipation.  Check CT of the abdomen and pelvis without contrast for further evaluation.  Consider further intervention pending results.  Hypertension  Continue amlodipine.  Patient reports running out of his doxazosin.  Restart doxazosin 2 mg nightly.  Hepatitis C  Known history of.  Recent referral to GI.  Patient reports he did not follow-up.  Nicotine dependence  Reports smoking approximately 1 pack of cigarettes every 2 weeks.  Nicotine patch ordered.  Chronic alcohol use  Reports drinking 1 standard drink every evening.  Start thiamine.  Monitor for signs of withdrawal.  Gastroesophageal reflux disease  Restart Protonix  Bicytopenia  Chronic leukopenia and anemia per chart review.  Hemoglobin slightly above baseline.  Daily CBC.      History of Present Illness:  Ronn Ramires is a 68 y.o. male with a history of chronic diarrhea, hypertension, hepatitis C, chronic alcohol use, and nicotine dependence who presented to [facility] with chief complaint of muscle spasms.  The patient states over the last      Recent Labs     04/12/24  1150   AST 85*   ALT 39   BILIDIR <0.2   BILITOT 0.6   ALKPHOS 69     No results for input(s): \"INR\" in the last 72 hours.  No results for input(s): \"CKTOTAL\", \"TROPONINI\" in the last 72 hours.  Urinalysis:   Lab Results   Component Value Date/Time    NITRU NEGATIVE 12/09/2023 12:10 AM    WBCUA NONE SEEN 10/09/2023 03:15 PM    BACTERIA NONE SEEN 10/09/2023 03:15 PM    RBCUA 0-2 10/09/2023 03:15 PM    BLOODU NEGATIVE 12/09/2023 12:10 AM    SPECGRAV 1.007 12/09/2023 12:10 AM    GLUCOSEU NEGATIVE 06/12/2018 09:40 PM       EKG: Normal sinus rhythm    Radiology:  No results found.    DVT prophylaxis: Lovenox    Diet: ADULT DIET; Regular    Fluids: LR    Code Status: Full Code    Therapies: Physical therapy    Tele:   [x] yes             [] no      Thank you Camilo Guadalupe MD for the opportunity to be involved in this patient's care.    Electronically signed by Roderick Benavides PA-C on 4/12/2024 at 2:36 PM

## 2024-04-12 NOTE — ED NOTES
ED to inpatient nurses report      Chief Complaint:  Chief Complaint   Patient presents with    Arm Pain     cramping     Present to ED from: home    MOA:     LOC: alert and orientated to name, place, date  Mobility: Requires assistance * 1  Oxygen Baseline: room air    Current needs required: none     Code Status:   Full Code    Mental Status:  Level of Consciousness: Alert (0)    Psych Assessment:        Vitals:  Patient Vitals for the past 24 hrs:   BP Temp Temp src Pulse Resp SpO2 Weight   04/12/24 1349 -- -- -- 90 13 100 % --   04/12/24 1348 (!) 147/100 -- -- 91 18 -- --   04/12/24 1242 (!) 141/96 -- -- 93 12 99 % --   04/12/24 1103 123/75 97.5 °F (36.4 °C) Axillary 83 24 99 % 65.3 kg (144 lb)        LDAs:   Peripheral IV 04/12/24 Posterior;Right Hand (Active)   Site Assessment Clean, dry & intact 04/12/24 1130   Line Status Blood return noted;Flushed;Specimen collected 04/12/24 1130   Dressing Status Clean, dry & intact 04/12/24 1130       Ambulatory Status:  No data recorded    Diagnosis:  DISPOSITION Admitted 04/12/2024 01:52:06 PM   Final diagnoses:   Carpopedal spasm   Hypocalcemia   Diarrhea, unspecified type   MICKY (acute kidney injury) ([facility])   Dehydration        Consults:  None     Pain Score:  Pain Assessment  Pain Assessment: 0-10  Pain Level: 10  Pain Location: Arm  Pain Orientation: Left, Right    C-SSRS:        Sepsis Screening:  Sepsis Risk Score: 5.37    [place] Fall Risk:       Swallow Screening        Preferred Language:   English      ALLERGIES     Sulfa antibiotics    SURGICAL HISTORY       Past Surgical History:   Procedure Laterality Date    COLONOSCOPY         PAST MEDICAL HISTORY       Past Medical History:   Diagnosis Date    Asthma     Cerebral artery occlusion with cerebral infarction (HCC)     Chronic kidney disease     Community acquired pneumonia 1/7/2024    Diarrhea     Hemodialysis patient ([facility])     previous    Hypertension     Liver disease     Psychiatric problem             Electronically signed by Kinsey Corral RN on 4/12/2024 at 2:03 PM

## 2024-04-12 NOTE — ED NOTES
Pt transported to [facility]. Floor contacted prior to transport, spoke to floor staff.

## 2024-04-12 NOTE — ED TRIAGE NOTES
Presents to ED with c/o bilateral arm cramping that started \"a little bit ago\". Patient alert and oriented. Respirations easy and unlabored.

## 2024-04-12 NOTE — ED PROVIDER NOTES
PATIENT NAME: Ronn Ramires  MRN: [id number]  : 1955  WILSON: 2024    I performed a history and physical examination of the patient and discussed management with the Resident. I reviewed the Resident's note and agree with the documented findings and plan of care. Any areas of disagreement are noted on the chart. I was personally present for the key portions of any procedures and have documented in the chart those procedures where I was not present during the key portions. I have reviewed the emergency nurses triage note and agree with the chief complaint, past medical history, past surgical history, allergies, medications, social and family history as documented unless otherwise noted below.    MEDICAL DEDISION MAKING (MDM)     Ronn Ramires is a 68 y.o. male who presents to Emergency Department with Arm Pain (cramping)    ED workups reveal MICKY and hypocalcemia, chronic leukopenia and anemia.  Patient has symptomatic hypocalcemia due to diarrhea, admission is indicated.  Calcium gluconate and normal saline bolus administered in ED.  Discussed with and admitted to hospital medicine service.    EKG interpreted by me compared to old 1 from 1/3/2024: NSR, VR 83 bpm, , QRS 84, , borderline normal EKG, no significant changes    Vitals:    24 1349 24 1437 24 1721 24 2110   BP:  (!) 147/90  (!) 144/84   Pulse: 90 80  89   Resp: 13 18  20   Temp:  97.7 °F (36.5 °C)  98.1 °F (36.7 °C)   TempSrc:  Oral  Oral   SpO2: 100% 100%  99%   Weight:   65.3 kg (144 lb)    Height:   1.727 m (5' 8\")      Labs Reviewed   BASIC METABOLIC PANEL - Abnormal; Notable for the following components:       Result Value    Chloride 96 (*)     Creatinine 1.5 (*)     All other components within normal limits   CBC WITH AUTO DIFFERENTIAL - Abnormal; Notable for the following components:    WBC 3.4 (*)     RBC 3.89 (*)     Hemoglobin 13.4 (*)     Hematocrit 39.4 (*)     .3 (*)     MCH 34.4  0.9 % bolus 1,000 mL (0 mLs IntraVENous Stopped 4/12/24 1241)   calcium gluconate 1000 mg in sodium chloride 100 mL (0 mg IntraVENous Stopped 4/12/24 1500)   sodium chloride 0.9 % bolus 1,000 mL (0 mLs IntraVENous Stopped 4/12/24 1348)     FINAL IMPRESSION AND DISPOSITION      1. Carpopedal spasm    2. Hypocalcemia    3. Diarrhea, unspecified type    4. MICKY (acute kidney injury) (HCC)    5. Dehydration        DISPOSITION Admitted 04/12/2024 01:52:06 PM      PATIENT REFERRED TO:  No follow-up provider specified.  DISCHARGE MEDICATIONS:  Current Discharge Medication List          (Please note that portions of this note were completed with a voice recognition program.  Efforts were made to edit the dictations but occasionally words aremis-transcribed.)    MD Krzysztof Hernandez Jian, MD  04/12/24 9425

## 2024-04-12 NOTE — ED PROVIDER NOTES
ED Medications administered this visit:   Medications   calcium gluconate 1000 mg in sodium chloride 100 mL (has no administration in time range)   sodium chloride 0.9 % bolus 1,000 mL (0 mLs IntraVENous Stopped 4/12/24 1241)   sodium chloride 0.9 % bolus 1,000 mL (0 mLs IntraVENous Stopped 4/12/24 1348)         MEDICAL DECISION MAKING     ED Course as of 04/12/24 1349   Fri Apr 12, 2024   1151 EKG Emergency  EKG showing normal sinus rhythm at rate of 83 bpm.  No significant ST elevation or ST depression noted.  Peaked T waves in V4.  This appears unchanged from previous EKG from January 2024.  No abnormal T wave inversions present.  No AV block seen.  , QRS 84, QTc 453. [JT]   1237 Creatinine(!): 1.5  MICKY present.  Creatinine up from 1.0-1.5. [JT]   1237 Calcium, Ionized(!): 1.04  Ionized calcium below normal limits with positive Chvostek sign and cardo pedal spasm.  Will give 1 g of calcium gluconate. [JT]   1346 68-year-old male coming in for bilateral arm pain and spasming.  He is homeless.  It looks like he was discharged in January for an admission due to syncope related to dehydration in the context of overflow diarrhea.  He has run out of his bowel regimen and has had persistent diarrhea.  He was at the soup kitchen earlier today and felt lightheaded and had labored breathing had an episode of emesis and EMS was called.  On arrival here he has bilateral carpopedal spasm and a positive chopsticks sign.  Also looks a little dry.  His ionized calcium level is 1.04.  I am rebleeding.  Additionally his creatinine has gone up from 1.0-1.5.  He is getting 2 L normal saline.  Given my concern for his housing insecurity and financial barriers to appropriate follow-up I would like to admit him for fluid and electrolyte replacement.  Hospitalist paged for admission. [JT]      ED Course User Index  [JT] Carlos Lee MD     Available laboratory and imaging results were independently reviewed and  clinically correlated.    Decision Rules/Clinical Scores utilized:  Not Applicable.     Code Status:  Not addressed during this ED visit    Social determinants of health impacting treatment or disposition:  Not Applicable.    Medical Commodities impacting treatment or disposition:    Past Medical History:   Diagnosis Date    Asthma     Cerebral artery occlusion with cerebral infarction (HCC)     Chronic kidney disease     Community acquired pneumonia 1/7/2024    Diarrhea     Hemodialysis patient (HCC)     previous    Hypertension     Liver disease     Psychiatric problem        Consultants: Not Applicable.    Final Assessment and Plan:   MICKY  Carpopedal spasm and hypocalcemia  Dehydration and MICKY  IV fluid bolus x2  Ionized calcium low, will replete  Labs ordered and reviewed EKG reviewed  Admit to hospitalist service for fluid and electrolyte repletion      MEDICATION CHANGES     DISCHARGE MEDICATIONS:  New Prescriptions    No medications on file            FINAL DISPOSITION     Final diagnoses:   Carpopedal spasm   Hypocalcemia   Diarrhea, unspecified type   MICKY (acute kidney injury) (HCC)   Dehydration     Condition: condition: good  Dispo: Admit to med/surg floor    PATIENT REFERRED TO:  No follow-up provider specified.    The results of pertinent diagnostic studies and exam findings were discussed. The patient’s provisional diagnosis and plan of care were discussed with the patient and present family who expressed understanding. Any medications were reviewed and indications and risks of medications were discussed with the patient /family present. Strict verbal and written return precautions, instructions and appropriate follow-up provided to  the patient    Critical Care Time   CRITICAL CARE:  None    PROCEDURES: (None if blank)  Procedures:   Medical Decision Making  Problems Addressed:  MICKY (acute kidney injury) (HCC): acute illness or injury  Carpopedal spasm: acute illness or injury  Dehydration: acute

## 2024-04-13 VITALS
HEART RATE: 85 BPM | HEIGHT: 68 IN | DIASTOLIC BLOOD PRESSURE: 74 MMHG | BODY MASS INDEX: 21.82 KG/M2 | TEMPERATURE: 97.9 F | OXYGEN SATURATION: 100 % | SYSTOLIC BLOOD PRESSURE: 111 MMHG | WEIGHT: 144 LBS | RESPIRATION RATE: 18 BRPM

## 2024-04-13 LAB
ANION GAP SERPL CALC-SCNC: 13 MEQ/L (ref 8–16)
BASOPHILS ABSOLUTE: 0 THOU/MM3 (ref 0–0.1)
BASOPHILS NFR BLD AUTO: 0.8 %
BUN SERPL-MCNC: 17 MG/DL (ref 7–22)
CA-I BLD ISE-SCNC: 1.11 MMOL/L (ref 1.12–1.32)
CALCIUM SERPL-MCNC: 8.6 MG/DL (ref 8.5–10.5)
CHLORIDE SERPL-SCNC: 97 MEQ/L (ref 98–111)
CO2 SERPL-SCNC: 24 MEQ/L (ref 23–33)
CREAT SERPL-MCNC: 1 MG/DL (ref 0.4–1.2)
DEPRECATED RDW RBC AUTO: 49.8 FL (ref 35–45)
EKG ATRIAL RATE: 83 BPM
EKG P AXIS: 77 DEGREES
EKG P-R INTERVAL: 154 MS
EKG Q-T INTERVAL: 386 MS
EKG QRS DURATION: 84 MS
EKG QTC CALCULATION (BAZETT): 453 MS
EKG R AXIS: 80 DEGREES
EKG T AXIS: 77 DEGREES
EKG VENTRICULAR RATE: 83 BPM
EOSINOPHIL NFR BLD AUTO: 2.9 %
EOSINOPHILS ABSOLUTE: 0.1 THOU/MM3 (ref 0–0.4)
ERYTHROCYTE [DISTWIDTH] IN BLOOD BY AUTOMATED COUNT: 13.3 % (ref 11.5–14.5)
GFR SERPL CREATININE-BSD FRML MDRD: 81 ML/MIN/1.73M2
GLUCOSE SERPL-MCNC: 82 MG/DL (ref 70–108)
HCT VFR BLD AUTO: 36.7 % (ref 42–52)
HGB BLD-MCNC: 12.6 GM/DL (ref 14–18)
IMM GRANULOCYTES # BLD AUTO: 0 THOU/MM3 (ref 0–0.07)
IMM GRANULOCYTES NFR BLD AUTO: 0 %
LYMPHOCYTES ABSOLUTE: 1.3 THOU/MM3 (ref 1–4.8)
LYMPHOCYTES NFR BLD AUTO: 55.2 %
MCH RBC QN AUTO: 34.9 PG (ref 26–33)
MCHC RBC AUTO-ENTMCNC: 34.3 GM/DL (ref 32.2–35.5)
MCV RBC AUTO: 101.7 FL (ref 80–94)
MONOCYTES ABSOLUTE: 0.3 THOU/MM3 (ref 0.4–1.3)
MONOCYTES NFR BLD AUTO: 12.6 %
NEUTROPHILS NFR BLD AUTO: 28.5 %
NRBC BLD AUTO-RTO: 0 /100 WBC
PLATELET # BLD AUTO: 159 THOU/MM3 (ref 130–400)
PMV BLD AUTO: 10.2 FL (ref 9.4–12.4)
POTASSIUM SERPL-SCNC: 3.9 MEQ/L (ref 3.5–5.2)
RBC # BLD AUTO: 3.61 MILL/MM3 (ref 4.7–6.1)
SCAN OF BLOOD SMEAR: NORMAL
SEGMENTED NEUTROPHILS ABSOLUTE COUNT: 0.7 THOU/MM3 (ref 1.8–7.7)
SODIUM SERPL-SCNC: 134 MEQ/L (ref 135–145)
WBC # BLD AUTO: 2.4 THOU/MM3 (ref 4.8–10.8)

## 2024-04-13 PROCEDURE — 99239 HOSP IP/OBS DSCHRG MGMT >30: CPT | Performed by: PHYSICIAN ASSISTANT

## 2024-04-13 PROCEDURE — 80048 BASIC METABOLIC PNL TOTAL CA: CPT

## 2024-04-13 PROCEDURE — 36415 COLL VENOUS BLD VENIPUNCTURE: CPT

## 2024-04-13 PROCEDURE — G0378 HOSPITAL OBSERVATION PER HR: HCPCS

## 2024-04-13 PROCEDURE — 6370000000 HC RX 637 (ALT 250 FOR IP): Performed by: PHYSICIAN ASSISTANT

## 2024-04-13 PROCEDURE — 96372 THER/PROPH/DIAG INJ SC/IM: CPT

## 2024-04-13 PROCEDURE — 85025 COMPLETE CBC W/AUTO DIFF WBC: CPT

## 2024-04-13 PROCEDURE — 6360000002 HC RX W HCPCS: Performed by: PHYSICIAN ASSISTANT

## 2024-04-13 PROCEDURE — 2580000003 HC RX 258: Performed by: PHYSICIAN ASSISTANT

## 2024-04-13 PROCEDURE — 96361 HYDRATE IV INFUSION ADD-ON: CPT

## 2024-04-13 PROCEDURE — 93010 ELECTROCARDIOGRAM REPORT: CPT | Performed by: NUCLEAR MEDICINE

## 2024-04-13 PROCEDURE — 82330 ASSAY OF CALCIUM: CPT

## 2024-04-13 RX ORDER — LANOLIN ALCOHOL/MO/W.PET/CERES
100 CREAM (GRAM) TOPICAL DAILY
Qty: 30 TABLET | Refills: 3 | Status: SHIPPED | OUTPATIENT
Start: 2024-04-14

## 2024-04-13 RX ORDER — MULTIVITAMIN WITH IRON
1 TABLET ORAL DAILY
Qty: 30 TABLET | Refills: 0 | Status: SHIPPED | OUTPATIENT
Start: 2024-04-14 | End: 2024-05-14

## 2024-04-13 RX ORDER — LOPERAMIDE HYDROCHLORIDE 2 MG/1
2 CAPSULE ORAL 4 TIMES DAILY PRN
Qty: 28 CAPSULE | Refills: 0 | Status: SHIPPED | OUTPATIENT
Start: 2024-04-13 | End: 2024-04-20

## 2024-04-13 RX ORDER — MELATONIN
2000 DAILY
Qty: 30 TABLET | Refills: 0 | Status: SHIPPED | OUTPATIENT
Start: 2024-04-13

## 2024-04-13 RX ORDER — ERGOCALCIFEROL 1.25 MG/1
50000 CAPSULE ORAL WEEKLY
Status: COMPLETED | OUTPATIENT
Start: 2024-04-13 | End: 2024-04-13

## 2024-04-13 RX ADMIN — ENOXAPARIN SODIUM 40 MG: 100 INJECTION SUBCUTANEOUS at 09:35

## 2024-04-13 RX ADMIN — PANTOPRAZOLE SODIUM 40 MG: 40 TABLET, DELAYED RELEASE ORAL at 04:36

## 2024-04-13 RX ADMIN — Medication 100 MG: at 09:34

## 2024-04-13 RX ADMIN — Medication 1 TABLET: at 09:34

## 2024-04-13 RX ADMIN — SODIUM CHLORIDE, POTASSIUM CHLORIDE, SODIUM LACTATE AND CALCIUM CHLORIDE: 600; 310; 30; 20 INJECTION, SOLUTION INTRAVENOUS at 04:39

## 2024-04-13 RX ADMIN — AMLODIPINE BESYLATE 10 MG: 10 TABLET ORAL at 09:34

## 2024-04-13 RX ADMIN — ERGOCALCIFEROL 50000 UNITS: 1.25 CAPSULE ORAL at 13:42

## 2024-04-13 NOTE — DISCHARGE SUMMARY
Hospitalist Discharge Note      Patient:  Ronn Ramires    Unit/Bed:8B-36/036-A  YOB: 1955  MRN: [id number]   Acct: [id number]     PCP: Camilo Guadalupe MD  Date of Admission: 4/12/2024      Discharge date: 4/13/2024  2:10 PM    Chief Complaint on presentation :-Muscle spasms    Discharge Assessment and Plan:-   MICKY, resolved  Hypocalcemia  Diarrhea, improved  Essential hypertension  Hepatitis C, untreated  Tobacco use  Chronic alcohol use disorder  GERD  Bicytopenia    Initial H and P and Hospital course:-  \"Ronn Ramires is a 68 y.o. male with a history of chronic diarrhea, hypertension, hepatitis C, chronic alcohol use, and nicotine dependence who presented to [facility] with chief complaint of muscle spasms.  The patient states over the last day or so he has been having spasms of his bilateral thighs and bilateral upper extremities.  He also had an episode of nausea and vomiting today.  As such, who presented to the emergency department where he was found to have an acute kidney injury.  He was also found to have hypocalcemia.  He is being admitted for further evaluation.  The patient reports having had diarrhea for the last few months.  He states every time\he eats he will have to go to the bathroom.  He reports his last formed bowel movement was approximately 2 weeks ago.  He does report some intermittent fevers and chills with this.  Upon reviewing the patient's chart, he does have a history of constipation and suspected overflow diarrhea.  Otherwise, the patient endorses chronic shortness of breath/chest heaviness which is unchanged from prior.  The patient reports he does drink a standard drink of alcohol every night.  He also smokes about 1 pack of cigarettes every 2 weeks.  He does report recent use of Advil within the last week.  Endorses marijuana use, but no other drug use. \"    Please see above for full details.  Patient was admitted secondary to chief  normal bowel sounds.  Musculoskeletal:  No clubbing, cyanosis or edema bilaterally.  Skin: Skin color, texture, turgor normal.  No rashes or lesions.  Neurologic:  Neurovascularly intact without any focal sensory/motor deficits. Cranial nerves: II-XII intact, grossly non-focal.  Psychiatric: Alert and oriented, thought content appropriate, normal insight  Capillary Refill: Brisk,< 3 seconds   Peripheral Pulses: +2 palpable, equal bilaterally       Discharge Medications:-      Medication List        START taking these medications      loperamide 2 MG capsule  Commonly known as: IMODIUM  Take 1 capsule by mouth 4 times daily as needed for Diarrhea     multivitamin Tabs tablet  Take 1 tablet by mouth daily  Start taking on: April 14, 2024     thiamine 100 MG tablet  Take 1 tablet by mouth daily  Start taking on: April 14, 2024     vitamin D3 25 MCG (1000 UT) Tabs tablet  Generic drug: vitamin D  Take 2 tablets by mouth daily            CONTINUE taking these medications      amLODIPine 10 MG tablet  Commonly known as: NORVASC  Take 1 tablet by mouth daily     doxazosin 2 MG tablet  Commonly known as: CARDURA  Take 1 tablet by mouth nightly     fluticasone 50 MCG/ACT nasal spray  Commonly known as: FLONASE  1 spray by Each Nostril route daily     pantoprazole 40 MG tablet  Commonly known as: PROTONIX  Take 1 tablet by mouth every morning (before breakfast)               Where to Get Your Medications        These medications were sent to [id number] - [place], [place] - [address]  [facility] - P [phone] - F [phone]  [address] [place] [id number]      Phone: [phone]   loperamide 2 MG capsule  multivitamin Tabs tablet  thiamine 100 MG tablet  vitamin D3 25 MCG (1000 UT) Tabs tablet          Labs :-  Recent Results (from the past 72 hour(s))   EKG Emergency    Collection Time: 04/12/24 10:55 AM   Result Value Ref Range    Ventricular Rate 83 BPM    Atrial Rate 83 BPM    P-R Interval 154 ms    QRS

## 2024-04-13 NOTE — PLAN OF CARE
Problem: Discharge Planning  Goal: Discharge to home or other facility with appropriate resources  Outcome: Progressing  Flowsheets  Discharge to home or other facility with appropriate resources: Identify barriers to discharge with patient and caregiver    Problem: Pain  Goal: Verbalizes/displays adequate comfort level or baseline comfort level  Outcome: Progressing  Flowsheets   Verbalizes/displays adequate comfort level or baseline comfort level:   Encourage patient to monitor pain and request assistance   Assess pain using appropriate pain scale     Problem: Safety - Adult  Goal: Free from fall injury  Outcome: Progressing  Note: Patient free of falls this shift. Patient on falling star program. Fall band intact. RN visually checks on patient with hourly rounds. Patient tolerates ambulation each shift.       Problem: Metabolic/Fluid and Electrolytes - Adult  Goal: Electrolytes maintained within normal limits  Outcome: Progressing  Flowsheets   Electrolytes maintained within normal limits: Monitor labs and assess patient for signs and symptoms of electrolyte imbalances     Care plan reviewed with patient.  Patient verbalizes understanding of the plan of care and contribute to goal setting.

## 2024-04-13 NOTE — PROGRESS NOTES
Discharge teaching and instructions for diagnosis/procedure of MICKY completed with patient using teachback method. AVS reviewed. Printed prescriptions given to patient. Patient voiced understanding regarding prescriptions, follow up appointments, and care of self at home. Discharged in a wheelchair to  home with support per  [facility]

## 2024-04-16 ENCOUNTER — TELEPHONE (OUTPATIENT)
Dept: INTERNAL MEDICINE CLINIC | Age: 69
End: 2024-04-16

## 2024-04-16 NOTE — TELEPHONE ENCOUNTER
Care Transitions Initial Follow Up Call    Outreach made within 2 business days of discharge: Yes    Patient: Ronn Ramires Patient : 1955   MRN: [id number]  Reason for Admission: Acute kidney injury  Discharge Date: 24       Spoke with: Woman answered phone and said Ronn wasn't there. She will have him call our office.    Discharge department/facility: [facility]      Scheduled appointment with PCP within 7-14 days    Follow Up  No future appointments.    Floridalma Mccoy MA

## 2024-04-17 LAB
EKG ATRIAL RATE: 83 BPM
EKG P AXIS: 77 DEGREES
EKG P-R INTERVAL: 154 MS
EKG Q-T INTERVAL: 386 MS
EKG QRS DURATION: 84 MS
EKG QTC CALCULATION (BAZETT): 453 MS
EKG R AXIS: 80 DEGREES
EKG T AXIS: 77 DEGREES
EKG VENTRICULAR RATE: 83 BPM

## 2024-05-02 ENCOUNTER — HOSPITAL ENCOUNTER (INPATIENT)
Age: 69
LOS: 2 days | Discharge: HOME | End: 2024-05-04
Attending: INTERNAL MEDICINE
Payer: MEDICARE

## 2024-05-02 ENCOUNTER — APPOINTMENT (OUTPATIENT)
Dept: CT IMAGING | Age: 69
End: 2024-05-02
Payer: MEDICARE

## 2024-05-02 ENCOUNTER — APPOINTMENT (OUTPATIENT)
Dept: GENERAL RADIOLOGY | Age: 69
End: 2024-05-02
Payer: MEDICARE

## 2024-05-02 DIAGNOSIS — R55 PRE-SYNCOPE: ICD-10-CM

## 2024-05-02 DIAGNOSIS — R55 SYNCOPE AND COLLAPSE: Primary | ICD-10-CM

## 2024-05-02 DIAGNOSIS — E86.0 DEHYDRATION: ICD-10-CM

## 2024-05-02 DIAGNOSIS — N17.9 ACUTE KIDNEY INJURY (HCC): ICD-10-CM

## 2024-05-02 DIAGNOSIS — R42 DIZZINESS: ICD-10-CM

## 2024-05-02 DIAGNOSIS — R26.0 ATAXIC GAIT: ICD-10-CM

## 2024-05-02 PROBLEM — I27.20 PULMONARY HYPERTENSION (HCC): Status: ACTIVE | Noted: 2024-05-02

## 2024-05-02 PROBLEM — N18.9 ACUTE KIDNEY INJURY SUPERIMPOSED ON CKD (HCC): Status: ACTIVE | Noted: 2024-04-12

## 2024-05-02 PROBLEM — Z72.0 TOBACCO USE: Status: ACTIVE | Noted: 2024-05-02

## 2024-05-02 PROBLEM — F10.90 CHRONIC ALCOHOL USE: Status: ACTIVE | Noted: 2024-05-02

## 2024-05-02 PROBLEM — Z86.19 HISTORY OF HEPATITIS C: Status: ACTIVE | Noted: 2024-05-02

## 2024-05-02 PROBLEM — Z91.199 NONCOMPLIANCE: Status: ACTIVE | Noted: 2024-05-02

## 2024-05-02 LAB
ALBUMIN SERPL BCG-MCNC: 4.2 G/DL (ref 3.5–5.1)
ALP SERPL-CCNC: 67 U/L (ref 38–126)
ALT SERPL W/O P-5'-P-CCNC: 22 U/L (ref 11–66)
ANION GAP SERPL CALC-SCNC: 15 MEQ/L (ref 8–16)
AST SERPL-CCNC: 36 U/L (ref 5–40)
BASOPHILS ABSOLUTE: 0 THOU/MM3 (ref 0–0.1)
BASOPHILS NFR BLD AUTO: 0.6 %
BILIRUB SERPL-MCNC: 0.5 MG/DL (ref 0.3–1.2)
BILIRUB UR QL STRIP: NEGATIVE
BUN SERPL-MCNC: 17 MG/DL (ref 7–22)
CALCIUM SERPL-MCNC: 9.3 MG/DL (ref 8.5–10.5)
CHARACTER UR: CLEAR
CHLORIDE SERPL-SCNC: 98 MEQ/L (ref 98–111)
CO2 SERPL-SCNC: 23 MEQ/L (ref 23–33)
COLOR UR: YELLOW
CORTIS SERPL-MCNC: 23.22 UG/DL
CORTISOL COLLECTION INFO: NORMAL
CREAT SERPL-MCNC: 1.8 MG/DL (ref 0.4–1.2)
CREAT UR-MCNC: 26 MG/DL
DEPRECATED RDW RBC AUTO: 49.3 FL (ref 35–45)
EKG ATRIAL RATE: 60 BPM
EKG P AXIS: 66 DEGREES
EKG P-R INTERVAL: 180 MS
EKG Q-T INTERVAL: 434 MS
EKG QRS DURATION: 90 MS
EKG QTC CALCULATION (BAZETT): 434 MS
EKG R AXIS: 59 DEGREES
EKG T AXIS: 50 DEGREES
EKG VENTRICULAR RATE: 60 BPM
EOSINOPHIL NFR BLD AUTO: 0.6 %
EOSINOPHILS ABSOLUTE: 0 THOU/MM3 (ref 0–0.4)
ERYTHROCYTE [DISTWIDTH] IN BLOOD BY AUTOMATED COUNT: 12.7 % (ref 11.5–14.5)
GFR SERPL CREATININE-BSD FRML MDRD: 40 ML/MIN/1.73M2
GLUCOSE BLD STRIP.AUTO-MCNC: 79 MG/DL (ref 70–108)
GLUCOSE SERPL-MCNC: 154 MG/DL (ref 70–108)
GLUCOSE UR QL STRIP.AUTO: NEGATIVE MG/DL
HCT VFR BLD AUTO: 37.8 % (ref 42–52)
HGB BLD-MCNC: 12.3 GM/DL (ref 14–18)
HGB UR QL STRIP.AUTO: NEGATIVE
IMM GRANULOCYTES # BLD AUTO: 0.01 THOU/MM3 (ref 0–0.07)
IMM GRANULOCYTES NFR BLD AUTO: 0.3 %
KETONES UR QL STRIP.AUTO: NEGATIVE
LEUKOCYTE ESTERASE UR QL STRIP.AUTO: NEGATIVE
LYMPHOCYTES ABSOLUTE: 2 THOU/MM3 (ref 1–4.8)
LYMPHOCYTES NFR BLD AUTO: 58.5 %
MAGNESIUM SERPL-MCNC: 2.4 MG/DL (ref 1.6–2.4)
MCH RBC QN AUTO: 34.2 PG (ref 26–33)
MCHC RBC AUTO-ENTMCNC: 32.5 GM/DL (ref 32.2–35.5)
MCV RBC AUTO: 105 FL (ref 80–94)
MONOCYTES ABSOLUTE: 0.3 THOU/MM3 (ref 0.4–1.3)
MONOCYTES NFR BLD AUTO: 8.4 %
NEUTROPHILS ABSOLUTE: 1.1 THOU/MM3 (ref 1.8–7.7)
NEUTROPHILS NFR BLD AUTO: 31.6 %
NITRITE UR QL STRIP.AUTO: NEGATIVE
NRBC BLD AUTO-RTO: 0 /100 WBC
OSMOLALITY SERPL CALC.SUM OF ELEC: 276.6 MOSMOL/KG (ref 275–300)
PH UR STRIP.AUTO: 6 [PH] (ref 5–9)
PHOSPHATE SERPL-MCNC: 3.6 MG/DL (ref 2.4–4.7)
PLATELET # BLD AUTO: 242 THOU/MM3 (ref 130–400)
PMV BLD AUTO: 10.5 FL (ref 9.4–12.4)
POTASSIUM SERPL-SCNC: 3.8 MEQ/L (ref 3.5–5.2)
POTASSIUM SERPL-SCNC: 3.8 MEQ/L (ref 3.5–5.2)
PROT SERPL-MCNC: 7.9 G/DL (ref 6.1–8)
PROT UR STRIP.AUTO-MCNC: NEGATIVE MG/DL
RBC # BLD AUTO: 3.6 MILL/MM3 (ref 4.7–6.1)
SODIUM SERPL-SCNC: 136 MEQ/L (ref 135–145)
SODIUM UR-SCNC: 58 MEQ/L
SP GR UR REFRACT.AUTO: 1.01 (ref 1–1.03)
TROPONIN, HIGH SENSITIVITY: 17 NG/L (ref 0–12)
TSH SERPL DL<=0.005 MIU/L-ACNC: 2.21 UIU/ML (ref 0.4–4.2)
UROBILINOGEN UR QL STRIP.AUTO: 0.2 EU/DL (ref 0–1)
UUN 24H UR-MCNC: 284 MG/DL
WBC # BLD AUTO: 3.4 THOU/MM3 (ref 4.8–10.8)

## 2024-05-02 PROCEDURE — 6360000002 HC RX W HCPCS

## 2024-05-02 PROCEDURE — 84300 ASSAY OF URINE SODIUM: CPT

## 2024-05-02 PROCEDURE — 82533 TOTAL CORTISOL: CPT

## 2024-05-02 PROCEDURE — 96361 HYDRATE IV INFUSION ADD-ON: CPT

## 2024-05-02 PROCEDURE — 96360 HYDRATION IV INFUSION INIT: CPT

## 2024-05-02 PROCEDURE — 71045 X-RAY EXAM CHEST 1 VIEW: CPT

## 2024-05-02 PROCEDURE — 84443 ASSAY THYROID STIM HORMONE: CPT

## 2024-05-02 PROCEDURE — 70450 CT HEAD/BRAIN W/O DYE: CPT

## 2024-05-02 PROCEDURE — 36415 COLL VENOUS BLD VENIPUNCTURE: CPT

## 2024-05-02 PROCEDURE — 1200000003 HC TELEMETRY R&B

## 2024-05-02 PROCEDURE — 93005 ELECTROCARDIOGRAM TRACING: CPT | Performed by: INTERNAL MEDICINE

## 2024-05-02 PROCEDURE — 80053 COMPREHEN METABOLIC PANEL: CPT

## 2024-05-02 PROCEDURE — 82948 REAGENT STRIP/BLOOD GLUCOSE: CPT

## 2024-05-02 PROCEDURE — 81003 URINALYSIS AUTO W/O SCOPE: CPT

## 2024-05-02 PROCEDURE — 2580000003 HC RX 258: Performed by: INTERNAL MEDICINE

## 2024-05-02 PROCEDURE — 83735 ASSAY OF MAGNESIUM: CPT

## 2024-05-02 PROCEDURE — 93010 ELECTROCARDIOGRAM REPORT: CPT | Performed by: INTERNAL MEDICINE

## 2024-05-02 PROCEDURE — 85025 COMPLETE CBC W/AUTO DIFF WBC: CPT

## 2024-05-02 PROCEDURE — 84484 ASSAY OF TROPONIN QUANT: CPT

## 2024-05-02 PROCEDURE — 2580000003 HC RX 258

## 2024-05-02 PROCEDURE — 99285 EMERGENCY DEPT VISIT HI MDM: CPT

## 2024-05-02 PROCEDURE — 84100 ASSAY OF PHOSPHORUS: CPT

## 2024-05-02 PROCEDURE — 82570 ASSAY OF URINE CREATININE: CPT

## 2024-05-02 PROCEDURE — 6370000000 HC RX 637 (ALT 250 FOR IP)

## 2024-05-02 PROCEDURE — 99223 1ST HOSP IP/OBS HIGH 75: CPT | Performed by: STUDENT IN AN ORGANIZED HEALTH CARE EDUCATION/TRAINING PROGRAM

## 2024-05-02 PROCEDURE — 84540 ASSAY OF URINE/UREA-N: CPT

## 2024-05-02 RX ORDER — ENOXAPARIN SODIUM 100 MG/ML
40 INJECTION SUBCUTANEOUS DAILY
Status: DISCONTINUED | OUTPATIENT
Start: 2024-05-02 | End: 2024-05-04 | Stop reason: HOSPADM

## 2024-05-02 RX ORDER — SODIUM CHLORIDE 0.9 % (FLUSH) 0.9 %
5-40 SYRINGE (ML) INJECTION PRN
Status: DISCONTINUED | OUTPATIENT
Start: 2024-05-02 | End: 2024-05-04 | Stop reason: HOSPADM

## 2024-05-02 RX ORDER — VITAMIN B COMPLEX
2000 TABLET ORAL DAILY
Status: DISCONTINUED | OUTPATIENT
Start: 2024-05-03 | End: 2024-05-04 | Stop reason: HOSPADM

## 2024-05-02 RX ORDER — SODIUM CHLORIDE 0.9 % (FLUSH) 0.9 %
5-40 SYRINGE (ML) INJECTION EVERY 12 HOURS SCHEDULED
Status: DISCONTINUED | OUTPATIENT
Start: 2024-05-02 | End: 2024-05-04 | Stop reason: HOSPADM

## 2024-05-02 RX ORDER — PANTOPRAZOLE SODIUM 40 MG/1
40 TABLET, DELAYED RELEASE ORAL
Status: DISCONTINUED | OUTPATIENT
Start: 2024-05-03 | End: 2024-05-04 | Stop reason: HOSPADM

## 2024-05-02 RX ORDER — ACETAMINOPHEN 325 MG/1
650 TABLET ORAL EVERY 6 HOURS PRN
Status: DISCONTINUED | OUTPATIENT
Start: 2024-05-02 | End: 2024-05-04 | Stop reason: HOSPADM

## 2024-05-02 RX ORDER — POTASSIUM CHLORIDE 20 MEQ/1
40 TABLET, EXTENDED RELEASE ORAL PRN
Status: DISCONTINUED | OUTPATIENT
Start: 2024-05-02 | End: 2024-05-04 | Stop reason: HOSPADM

## 2024-05-02 RX ORDER — AMLODIPINE BESYLATE 10 MG/1
10 TABLET ORAL DAILY
Status: DISCONTINUED | OUTPATIENT
Start: 2024-05-02 | End: 2024-05-04 | Stop reason: HOSPADM

## 2024-05-02 RX ORDER — ONDANSETRON 2 MG/ML
4 INJECTION INTRAMUSCULAR; INTRAVENOUS EVERY 6 HOURS PRN
Status: DISCONTINUED | OUTPATIENT
Start: 2024-05-02 | End: 2024-05-04 | Stop reason: HOSPADM

## 2024-05-02 RX ORDER — SODIUM CHLORIDE 9 MG/ML
INJECTION, SOLUTION INTRAVENOUS PRN
Status: DISCONTINUED | OUTPATIENT
Start: 2024-05-02 | End: 2024-05-04 | Stop reason: HOSPADM

## 2024-05-02 RX ORDER — POLYETHYLENE GLYCOL 3350 17 G/17G
17 POWDER, FOR SOLUTION ORAL DAILY PRN
Status: DISCONTINUED | OUTPATIENT
Start: 2024-05-02 | End: 2024-05-04 | Stop reason: HOSPADM

## 2024-05-02 RX ORDER — ACETAMINOPHEN 650 MG/1
650 SUPPOSITORY RECTAL EVERY 6 HOURS PRN
Status: DISCONTINUED | OUTPATIENT
Start: 2024-05-02 | End: 2024-05-04 | Stop reason: HOSPADM

## 2024-05-02 RX ORDER — POTASSIUM CHLORIDE 7.45 MG/ML
10 INJECTION INTRAVENOUS PRN
Status: DISCONTINUED | OUTPATIENT
Start: 2024-05-02 | End: 2024-05-04 | Stop reason: HOSPADM

## 2024-05-02 RX ORDER — MULTIVITAMIN WITH IRON
1 TABLET ORAL DAILY
Status: DISCONTINUED | OUTPATIENT
Start: 2024-05-03 | End: 2024-05-04 | Stop reason: HOSPADM

## 2024-05-02 RX ORDER — LANOLIN ALCOHOL/MO/W.PET/CERES
100 CREAM (GRAM) TOPICAL DAILY
Status: DISCONTINUED | OUTPATIENT
Start: 2024-05-02 | End: 2024-05-04 | Stop reason: HOSPADM

## 2024-05-02 RX ORDER — 0.9 % SODIUM CHLORIDE 0.9 %
1000 INTRAVENOUS SOLUTION INTRAVENOUS ONCE
Status: COMPLETED | OUTPATIENT
Start: 2024-05-02 | End: 2024-05-02

## 2024-05-02 RX ORDER — MAGNESIUM SULFATE IN WATER 40 MG/ML
2000 INJECTION, SOLUTION INTRAVENOUS PRN
Status: DISCONTINUED | OUTPATIENT
Start: 2024-05-02 | End: 2024-05-04 | Stop reason: HOSPADM

## 2024-05-02 RX ORDER — SODIUM CHLORIDE 9 MG/ML
INJECTION, SOLUTION INTRAVENOUS CONTINUOUS
Status: DISCONTINUED | OUTPATIENT
Start: 2024-05-02 | End: 2024-05-04 | Stop reason: HOSPADM

## 2024-05-02 RX ORDER — ONDANSETRON 4 MG/1
4 TABLET, ORALLY DISINTEGRATING ORAL EVERY 8 HOURS PRN
Status: DISCONTINUED | OUTPATIENT
Start: 2024-05-02 | End: 2024-05-04 | Stop reason: HOSPADM

## 2024-05-02 RX ORDER — DOXAZOSIN 2 MG/1
2 TABLET ORAL NIGHTLY
Status: DISCONTINUED | OUTPATIENT
Start: 2024-05-02 | End: 2024-05-04 | Stop reason: HOSPADM

## 2024-05-02 RX ADMIN — SODIUM CHLORIDE 1000 ML: 9 INJECTION, SOLUTION INTRAVENOUS at 12:11

## 2024-05-02 RX ADMIN — ENOXAPARIN SODIUM 40 MG: 100 INJECTION SUBCUTANEOUS at 17:44

## 2024-05-02 RX ADMIN — SODIUM CHLORIDE: 9 INJECTION, SOLUTION INTRAVENOUS at 17:43

## 2024-05-02 RX ADMIN — Medication 100 MG: at 20:47

## 2024-05-02 RX ADMIN — ACETAMINOPHEN 650 MG: 325 TABLET ORAL at 20:52

## 2024-05-02 ASSESSMENT — PAIN SCALES - GENERAL
PAINLEVEL_OUTOF10: 0

## 2024-05-02 ASSESSMENT — PAIN - FUNCTIONAL ASSESSMENT
PAIN_FUNCTIONAL_ASSESSMENT: NONE - DENIES PAIN

## 2024-05-02 NOTE — ED NOTES
Pt presents to ED via [facility] for c/o unresponsive episode. Sister at bedside [place] the pt stated he didn't feel good, sat in a chair and passed out for approx 15 mins. Upon initial assessment, pt is A&Ox4, resps easy and unlabored. Pt denies pain. EMS reports  en route. Pt presents with 20g IV established in L forearm, infusing 0.9%NS with no s/s of infection or infiltration. Pt hypotensive upon arrival. EKG completed upon arrival. Protocol orders placed. Awaiting provider assessment and orders. Will monitor.

## 2024-05-02 NOTE — ED NOTES
In for hourly rounding. Pt resting on cot in position of comfort. Pt remains A&Ox4, resps easy and unlabored. IV shows no s/s of infection or infiltration. Pt continues to deny pain at this time. Urine specimen collected and sent to lab. Pt provided with warm blankets per request at this time. Monitor remains in place. Updated pt on POC. Will monitor.

## 2024-05-02 NOTE — ED NOTES
ED to inpatient nurses report      Chief Complaint:  Chief Complaint   Patient presents with    Loss of Consciousness     Present to ED from: Home    MOA:     LOC: alert and orientated to name, place, date  Mobility: Requires assistance * 1  Oxygen Baseline: Room Air    Current needs required: room Air     Code Status:   Full Code    What abnormal results were found and what did you give/do to treat them? Syncope  Any procedures or intervention occur? See Chart    Mental Status:  Level of Consciousness: Alert (0)    Psych Assessment:        Vitals:  Patient Vitals for the past 24 hrs:   BP Temp Temp src Pulse Resp SpO2 Weight   05/02/24 1605 123/89 -- -- 72 16 96 % --   05/02/24 1505 (!) 126/99 -- -- 81 18 92 % --   05/02/24 1405 129/81 -- -- 81 18 95 % --   05/02/24 1145 97/69 -- -- 63 19 96 % --   05/02/24 1116 (!) 88/64 97.4 °F (36.3 °C) Oral 60 18 98 % 65.3 kg (144 lb)        LDAs:   Peripheral IV 05/02/24 Left Forearm (Active)   Site Assessment Clean, dry & intact 05/02/24 1120   Line Status Infusing;Normal saline locked 05/02/24 1120   Phlebitis Assessment No symptoms 05/02/24 1120   Infiltration Assessment 0 05/02/24 1120   Alcohol Cap Used No 05/02/24 1120   Dressing Status Clean, dry & intact 05/02/24 1120   Dressing Type Transparent 05/02/24 1120       Ambulatory Status:  No data recorded    Diagnosis:  DISPOSITION Admitted 05/02/2024 04:07:33 PM   Final diagnoses:   Syncope and collapse   Acute kidney injury (HCC)   Dehydration        Consults:  IP CONSULT TO SOCIAL WORK     Pain Score:  Pain Assessment  Pain Assessment: None - Denies Pain  Pain Level: 0    C-SSRS:        Sepsis Screening:  Sepsis Risk Score: 2.43    [place] Fall Risk:       Swallow Screening        Preferred Language:   English      ALLERGIES     Sulfa antibiotics    SURGICAL HISTORY       Past Surgical History:   Procedure Laterality Date    COLONOSCOPY         PAST MEDICAL HISTORY       Past Medical History:   Diagnosis Date    Asthma

## 2024-05-02 NOTE — H&P
History & Physical        Patient:  Ronn Ramires  YOB: 1955    MRN: [id number]     Acct: [id number]    PCP: Camilo Guadalupe MD    Date of Admission: 5/2/2024    Date of Service: Pt seen/examined on 05/02/24  and Admitted to Inpatient with expected LOS greater than two midnights due to medical therapy.     Chief Complaint: Fatigue, passing out    Assessment and plan.  MICKY on CKD stage III.  Creatinine 1.8 with a baseline 1-1 0.2.  eGFR 40. likely Due to prerenal/dehydration.   -IVF given bolus in ED.  Will continue 100 mL NS.  -Daily BMP.  -Avoiding nephrotoxic's  -Daily weights SUMMER's,   Presyncope.  Unknown cause.  Likely dehydration. fall/aspiration precaution.  TSH/cortisol normal limits.  Daily weights, SUMMER's.  Daily BMP/magnesium/CBC.  Primary hypertension.  AV blocking medications are held.  Repeating limited echo to assess EF/CHF  History of hepatitis C.  Noted  Noncompliance.  Patient is encouraged for medication/healthcare appointments compliance.  Please encourage further  Tobacco smoking.  Patient is educated informed regarding smoking cessation  Chronic alcohol use.  Patient states that he did not drink past couple days.  GERD.  On PPI  CHF, HFimpEF, combined systolic/diastolic.  G1 DD.  Not in exacerbation.  Currently looks euvolemic.  Echo dated in 2018 June shows EF 35 to 40% and G1 DD.  However recent echo dated on August 2023 shows EF recovered to 55%.  G1 DD present.  RVSP 29 mmHg.  Will obtain limited echo to reassess.  Mild pulmonary hypertension.  Per echo data.  Will continue monitoring.    History Of Present Illness:    68 y.o. male who presented to [facility] with fatigue and passing out.  Patient states that he lives alone, and before coming to ED, he was walking into the street and felt dizzy and he felt he is passing out but did not injury.  Per patient, his last meal/fluid was around midnight and he did not eat breakfast.  Additionally some

## 2024-05-02 NOTE — PLAN OF CARE
Problem: Discharge Planning  Goal: Discharge to home or other facility with appropriate resources  Outcome: Progressing  Flowsheets (Taken 5/2/2024 1738)  Discharge to home or other facility with appropriate resources: Identify barriers to discharge with patient and caregiver     Problem: Safety - Adult  Goal: Free from fall injury  Outcome: Progressing  Flowsheets (Taken 5/2/2024 1830)  Free From Fall Injury: Instruct family/caregiver on patient safety   Care plan reviewed with patient.  Patient verbalizes understanding of the plan of care and contribute to goal setting.

## 2024-05-02 NOTE — ED NOTES
In for hourly rounding. Pt resting on cot in position of comfort. Pt remains A&Ox4, resps easy and unlabored. IV shows no s/s of infection or infiltration. Pt continues to deny pain at this time. Orthostatic VS completed at this time. Pt unable to stand stating \"I feel like the floor is trying to pull me down.\" Pt assisted back into the bed and fluids restarted. Monitor remains in place. Updated pt on POC. Will monitor.

## 2024-05-02 NOTE — ED PROVIDER NOTES
Constitutional:  Denies fever, chills, weight loss or weakness   Eyes:  Denies photophobia or discharge   HENT:  Denies sore throat or ear pain   Respiratory:  Denies cough or shortness of breath   Cardiovascular:  Denies chest pain, palpitations or swelling   GI:  Denies abdominal pain, nausea, vomiting, or diarrhea   Musculoskeletal:  Denies back pain   Skin:  Denies rash   Neurologic:  Denies headache, focal weakness or sensory changes   Endocrine:  Denies polyuria or polydypsia   Lymphatic:  Denies swollen glands   Psychiatric:  Denies depression, suicidal ideation or homicidal ideation   All systems negative except as marked.     PHYSICAL EXAM    VITAL SIGNS: /89   Pulse 72   Temp 97.4 °F (36.3 °C) (Oral)   Resp 16   Wt 65.3 kg (144 lb)   SpO2 96%   BMI 21.90 kg/m²    Constitutional:  Well developed, Well nourished, No acute distress, Non-toxic appearance.   HENT:  Normocephalic, Atraumatic, Bilateral external ears normal, Oropharynx moist, No oral exudates, Nose normal. Neck- Normal range of motion, No tenderness, Supple, No stridor.   Eyes:  PERRL, EOMI, Conjunctiva normal, No discharge.   Respiratory:  Normal breath sounds, No respiratory distress, No wheezing, No chest tenderness.   Cardiovascular:  Normal heart rate, Normal rhythm, No murmurs, No rubs, No gallops.   GI:  Bowel sounds normal, Soft, No tenderness, No masses, No pulsatile masses.   : External genitalia appear normal, No masses or lesions. No discharge. No CVA tenderness.   Musculoskeletal:  Intact distal pulses, No edema, No tenderness, No cyanosis, No clubbing. Good range of motion in all major joints. No tenderness to palpation or major deformities noted. Back- No tenderness.   Integument:  Warm, Dry, No erythema, No rash.   Lymphatic:  No lymphadenopathy noted.   Neurologic:  Alert & oriented x 3, Normal motor function, Normal sensory function, No focal deficits noted.   Psychiatric:  Affect normal, Judgment normal, Mood

## 2024-05-02 NOTE — ED NOTES
Pt in by [facility]. He was walking to the bank with his sister when he became dizzy. He sat down at a bench and sister states he went unresponsive until EMS arrived (estimated 15-20min per sister). When EMS arrived he was diaphoretic but talking. He improved through route. He still states he feels \"out of it\". He has history of syncope and dehydration and has been seen for this in the past. EMS obtained glucose of 186. They placed an IV and started a fluid bolus.

## 2024-05-03 ENCOUNTER — APPOINTMENT (OUTPATIENT)
Age: 69
End: 2024-05-03
Payer: MEDICARE

## 2024-05-03 LAB
ANION GAP SERPL CALC-SCNC: 13 MEQ/L (ref 8–16)
BASOPHILS ABSOLUTE: 0 THOU/MM3 (ref 0–0.1)
BASOPHILS NFR BLD AUTO: 0.8 %
BUN SERPL-MCNC: 12 MG/DL (ref 7–22)
CALCIUM SERPL-MCNC: 9 MG/DL (ref 8.5–10.5)
CHLORIDE SERPL-SCNC: 99 MEQ/L (ref 98–111)
CO2 SERPL-SCNC: 23 MEQ/L (ref 23–33)
CREAT SERPL-MCNC: 0.9 MG/DL (ref 0.4–1.2)
DEPRECATED RDW RBC AUTO: 47 FL (ref 35–45)
ECHO AO ASC DIAM: 3.4 CM
ECHO AO ASCENDING AORTA INDEX: 1.9 CM/M2
ECHO AV CUSP MM: 2.1 CM
ECHO BSA: 1.77 M2
ECHO LA AREA 2C: 19.6 CM2
ECHO LA AREA 4C: 19.2 CM2
ECHO LA DIAMETER INDEX: 2.01 CM/M2
ECHO LA DIAMETER: 3.6 CM
ECHO LA MAJOR AXIS: 5.1 CM
ECHO LA MINOR AXIS: 5.3 CM
ECHO LA VOL BP: 58 ML (ref 18–58)
ECHO LA VOL MOD A2C: 60 ML (ref 18–58)
ECHO LA VOL MOD A4C: 53 ML (ref 18–58)
ECHO LA VOL/BSA BIPLANE: 32 ML/M2 (ref 16–34)
ECHO LA VOLUME INDEX MOD A2C: 34 ML/M2 (ref 16–34)
ECHO LA VOLUME INDEX MOD A4C: 30 ML/M2 (ref 16–34)
ECHO LV FRACTIONAL SHORTENING: 31 % (ref 28–44)
ECHO LV INTERNAL DIMENSION DIASTOLE INDEX: 2.35 CM/M2
ECHO LV INTERNAL DIMENSION DIASTOLIC: 4.2 CM (ref 4.2–5.9)
ECHO LV INTERNAL DIMENSION SYSTOLIC INDEX: 1.62 CM/M2
ECHO LV INTERNAL DIMENSION SYSTOLIC: 2.9 CM
ECHO LV IVSD: 1 CM (ref 0.6–1)
ECHO LV MASS 2D: 137.2 G (ref 88–224)
ECHO LV MASS INDEX 2D: 76.7 G/M2 (ref 49–115)
ECHO LV POSTERIOR WALL DIASTOLIC: 1 CM (ref 0.6–1)
ECHO LV RELATIVE WALL THICKNESS RATIO: 0.48
ECHO RV INTERNAL DIMENSION: 3 CM
ECHO RV TAPSE: 2.2 CM (ref 1.7–?)
EOSINOPHIL NFR BLD AUTO: 2.7 %
EOSINOPHILS ABSOLUTE: 0.1 THOU/MM3 (ref 0–0.4)
ERYTHROCYTE [DISTWIDTH] IN BLOOD BY AUTOMATED COUNT: 12.5 % (ref 11.5–14.5)
GFR SERPL CREATININE-BSD FRML MDRD: > 90 ML/MIN/1.73M2
GLUCOSE BLD STRIP.AUTO-MCNC: 86 MG/DL (ref 70–108)
GLUCOSE SERPL-MCNC: 89 MG/DL (ref 70–108)
HCT VFR BLD AUTO: 35.6 % (ref 42–52)
HGB BLD-MCNC: 12 GM/DL (ref 14–18)
IMM GRANULOCYTES # BLD AUTO: 0 THOU/MM3 (ref 0–0.07)
IMM GRANULOCYTES NFR BLD AUTO: 0 %
LYMPHOCYTES ABSOLUTE: 1.4 THOU/MM3 (ref 1–4.8)
LYMPHOCYTES NFR BLD AUTO: 55.2 %
MCH RBC QN AUTO: 34.3 PG (ref 26–33)
MCHC RBC AUTO-ENTMCNC: 33.7 GM/DL (ref 32.2–35.5)
MCV RBC AUTO: 101.7 FL (ref 80–94)
MONOCYTES ABSOLUTE: 0.3 THOU/MM3 (ref 0.4–1.3)
MONOCYTES NFR BLD AUTO: 11.5 %
NEUTROPHILS ABSOLUTE: 0.8 THOU/MM3 (ref 1.8–7.7)
NEUTROPHILS NFR BLD AUTO: 29.8 %
NRBC BLD AUTO-RTO: 0 /100 WBC
PLATELET # BLD AUTO: 210 THOU/MM3 (ref 130–400)
PMV BLD AUTO: 10.4 FL (ref 9.4–12.4)
POTASSIUM SERPL-SCNC: 4 MEQ/L (ref 3.5–5.2)
RBC # BLD AUTO: 3.5 MILL/MM3 (ref 4.7–6.1)
SCAN OF BLOOD SMEAR: NORMAL
SODIUM SERPL-SCNC: 135 MEQ/L (ref 135–145)
TROPONIN, HIGH SENSITIVITY: 9 NG/L (ref 0–12)
WBC # BLD AUTO: 2.6 THOU/MM3 (ref 4.8–10.8)

## 2024-05-03 PROCEDURE — 93307 TTE W/O DOPPLER COMPLETE: CPT | Performed by: INTERNAL MEDICINE

## 2024-05-03 PROCEDURE — 93307 TTE W/O DOPPLER COMPLETE: CPT

## 2024-05-03 PROCEDURE — 85025 COMPLETE CBC W/AUTO DIFF WBC: CPT

## 2024-05-03 PROCEDURE — 6360000002 HC RX W HCPCS

## 2024-05-03 PROCEDURE — 82948 REAGENT STRIP/BLOOD GLUCOSE: CPT

## 2024-05-03 PROCEDURE — 2580000003 HC RX 258

## 2024-05-03 PROCEDURE — 1200000003 HC TELEMETRY R&B

## 2024-05-03 PROCEDURE — 80048 BASIC METABOLIC PNL TOTAL CA: CPT

## 2024-05-03 PROCEDURE — 84484 ASSAY OF TROPONIN QUANT: CPT

## 2024-05-03 PROCEDURE — 1200000000 HC SEMI PRIVATE

## 2024-05-03 PROCEDURE — 36415 COLL VENOUS BLD VENIPUNCTURE: CPT

## 2024-05-03 PROCEDURE — 99232 SBSQ HOSP IP/OBS MODERATE 35: CPT | Performed by: PHYSICIAN ASSISTANT

## 2024-05-03 PROCEDURE — 6370000000 HC RX 637 (ALT 250 FOR IP)

## 2024-05-03 RX ADMIN — Medication 100 MG: at 09:00

## 2024-05-03 RX ADMIN — PANTOPRAZOLE SODIUM 40 MG: 40 TABLET, DELAYED RELEASE ORAL at 05:30

## 2024-05-03 RX ADMIN — Medication 2000 UNITS: at 09:00

## 2024-05-03 RX ADMIN — Medication 1 TABLET: at 09:00

## 2024-05-03 RX ADMIN — SODIUM CHLORIDE: 9 INJECTION, SOLUTION INTRAVENOUS at 19:49

## 2024-05-03 RX ADMIN — SODIUM CHLORIDE: 9 INJECTION, SOLUTION INTRAVENOUS at 06:43

## 2024-05-03 RX ADMIN — ENOXAPARIN SODIUM 40 MG: 100 INJECTION SUBCUTANEOUS at 09:00

## 2024-05-03 NOTE — PLAN OF CARE
Problem: Discharge Planning  Goal: Discharge to home or other facility with appropriate resources  5/3/2024 0038 by Aleksandr Butterfield, RN  Outcome: Progressing  Flowsheets (Taken 5/2/2024 1738 by Gabriela Tapia, RN)  Discharge to home or other facility with appropriate resources: Identify barriers to discharge with patient and caregiver  5/2/2024 1830 by Gabriela Taipa, RN  Outcome: Progressing  Flowsheets (Taken 5/2/2024 1738)  Discharge to home or other facility with appropriate resources: Identify barriers to discharge with patient and caregiver     Problem: Safety - Adult  Goal: Free from fall injury  5/3/2024 0038 by Aleksandr Butterfield, RN  Outcome: Progressing  Flowsheets (Taken 5/2/2024 1830 by Gabriela Tapia, RN)  Free From Fall Injury: Instruct family/caregiver on patient safety  5/2/2024 1830 by Gabriela Tapia, RN  Outcome: Progressing  Flowsheets (Taken 5/2/2024 1830)  Free From Fall Injury: Instruct family/caregiver on patient safety

## 2024-05-03 NOTE — CARE COORDINATION
05/03/24 1130   Readmission Assessment   Number of Days since last admission? 8-30 days   Previous Disposition Home Alone   Who is being Interviewed Patient   What was the patient's/caregiver's perception as to why they think they needed to return back to the hospital? Could not/did not make appointment with PCP;Other (Comment)  (severe dizziness at home)   Did you visit your Primary Care Physician after you left the hospital, before you returned this time? No   Why weren't you able to visit your PCP? Could not get an appointment   Did you see a specialist, such as Cardiac, Pulmonary, Orthopedic Physician, etc. after you left the hospital? No   Who advised the patient to return to the hospital? Self-referral   Does the patient report anything that got in the way of taking their medications? Yes   What reasons did they give? Didn't want to take them (Comment)  (meds worsened his dizziness)   In our efforts to provide the best possible care to you and others like you, can you think of anything that we could have done to help you after you left the hospital the first time, so that you might not have needed to return so soon? Other (Comment)  (\"no, just need to figure out this dizziness\")
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
## 2024-05-03 NOTE — CARE COORDINATION
DISCHARGE PLANNING EVALUATION  5/3/24, 2:05 PM EDT    Reason for Referral: placement  Decision Maker: pt is alert and oriented, capable of  making own decisions   Current Services: none  New Services Requested: none  Family/ Social/ Home environment: pt states that he has been living with his sisterRoxana \"off and on\".  Pt states he is independent with personal care.  Payment Source:[employer] Medicare/Medicaid  Transportation at Discharge: sister  Post-acute (PAC) provider list was provided to patient. Patient was informed of their freedom to choose PAC provider. Discussed and offered to show the patient the relevant PAC Providers quality and resource use measures on Medicare Compare web site via computer based on patient's goals of care and treatment preferences. Questions regarding selection process were answered.      Teach Back Method used with pt regarding care plan and benefits of HH  Patient verbalized understanding of the plan of care and contribute to goal setting.       Patient preferences and discharge plan: SW met with pt, discussed discharge POC/Needs.  Pt states he plans on returning to his sisters apartment at discharge.  Discussed HH services and benefits of HH.  Pt declined HH.  Pt denied any needs at this time.      Electronically signed by AARON Khoury on 5/3/2024 at 2:05 PM

## 2024-05-03 NOTE — PROGRESS NOTES
Hospitalist Progress Note    Patient:  Ronn Ramires    YOB: 1955  Unit/Bed:6K-09/009-A  Date of Admission: 5/2/2024  Code Status: Full Code      Assessment/Plan:    Pre-syncope: was thought to be d/t dehydration and orthostatic hypotension. Still having symptoms despite this resolving. Unclear etiology. Echo unremarkable. Patient has had multiple admits for the same with extensive workup being completed. If no improvement in sx, will consider MRI and Neurology consult.     MICKY: Resolved with IVF     Essential HTN: on amlodipine and doxazosin - held for orthostatic hypotension.     Chronic HFpEF: H/o DCMP with recovered EF. No signs of decompensation. Echo completed. EF 55-60%.     Chronic hepatitis C: Noted. Check LFTs, ammonia given #1.     GERD: PPI    H/o alcohol abuse: Pt denies current use. If no improvement in symptoms of #1, can trial high dose thiamine.     Medical noncompliance:     Physical deconditioning: PT/OT      LDA: []CVC / []PICC / []Midline / []Raines / []Drains / []Mediport / [x]None  Antibiotics: no  Steroids: no  Labs (still needed?): [x]Yes / []No  IVF (still needed?): [x]Yes / []No    Level of care: []Step Down / [x]Med-Surg  Bed Status: [x]Inpatient / []Observation  Telemetry: [x]Yes / []No  PT/OT: [x]Yes / []No    DVT Prophylaxis: [x] Lovenox / [] Heparin / [] SCDs / [] Already on Systemic Anticoagulation / [] None     Chief Complaint: lightheadedness    HPI / Hospital Course: Per HPI: \"68 y.o. male who [facility] with fatigue and passing out.  Patient states that he lives alone, and before coming to ED, he was walking into the street and felt dizzy and he felt he is passing out but did not injury.  Per patient, his last meal/fluid was around midnight and he did not eat breakfast.  Additionally some muscle weakness he reports.  Patient's past medical history is known for primary hypertension, hepatitis C, chronic alcohol use, smoker.

## 2024-05-04 VITALS
OXYGEN SATURATION: 97 % | BODY MASS INDEX: 21.84 KG/M2 | HEART RATE: 68 BPM | RESPIRATION RATE: 18 BRPM | HEIGHT: 68 IN | WEIGHT: 144.1 LBS | DIASTOLIC BLOOD PRESSURE: 82 MMHG | TEMPERATURE: 97.9 F | SYSTOLIC BLOOD PRESSURE: 142 MMHG

## 2024-05-04 LAB
ANION GAP SERPL CALC-SCNC: 11 MEQ/L (ref 8–16)
BASOPHILS ABSOLUTE: 0 THOU/MM3 (ref 0–0.1)
BASOPHILS NFR BLD AUTO: 0.7 %
BUN SERPL-MCNC: 10 MG/DL (ref 7–22)
CALCIUM SERPL-MCNC: 9.1 MG/DL (ref 8.5–10.5)
CHLORIDE SERPL-SCNC: 98 MEQ/L (ref 98–111)
CO2 SERPL-SCNC: 24 MEQ/L (ref 23–33)
CREAT SERPL-MCNC: 0.9 MG/DL (ref 0.4–1.2)
DEPRECATED RDW RBC AUTO: 46.7 FL (ref 35–45)
EOSINOPHIL NFR BLD AUTO: 3.2 %
EOSINOPHILS ABSOLUTE: 0.1 THOU/MM3 (ref 0–0.4)
ERYTHROCYTE [DISTWIDTH] IN BLOOD BY AUTOMATED COUNT: 12.4 % (ref 11.5–14.5)
GFR SERPL CREATININE-BSD FRML MDRD: > 90 ML/MIN/1.73M2
GLUCOSE SERPL-MCNC: 124 MG/DL (ref 70–108)
HCT VFR BLD AUTO: 38.3 % (ref 42–52)
HGB BLD-MCNC: 12.8 GM/DL (ref 14–18)
IMM GRANULOCYTES # BLD AUTO: 0.01 THOU/MM3 (ref 0–0.07)
IMM GRANULOCYTES NFR BLD AUTO: 0.4 %
LYMPHOCYTES ABSOLUTE: 1.9 THOU/MM3 (ref 1–4.8)
LYMPHOCYTES NFR BLD AUTO: 64.2 %
MCH RBC QN AUTO: 34.1 PG (ref 26–33)
MCHC RBC AUTO-ENTMCNC: 33.4 GM/DL (ref 32.2–35.5)
MCV RBC AUTO: 102.1 FL (ref 80–94)
MONOCYTES ABSOLUTE: 0.4 THOU/MM3 (ref 0.4–1.3)
MONOCYTES NFR BLD AUTO: 12.6 %
NEUTROPHILS ABSOLUTE: 0.5 THOU/MM3 (ref 1.8–7.7)
NEUTROPHILS NFR BLD AUTO: 18.9 %
NRBC BLD AUTO-RTO: 0 /100 WBC
PLATELET # BLD AUTO: 211 THOU/MM3 (ref 130–400)
PLATELET BLD QL SMEAR: ADEQUATE
PMV BLD AUTO: 10 FL (ref 9.4–12.4)
POTASSIUM SERPL-SCNC: 3.8 MEQ/L (ref 3.5–5.2)
RBC # BLD AUTO: 3.75 MILL/MM3 (ref 4.7–6.1)
SCAN OF BLOOD SMEAR: NORMAL
SODIUM SERPL-SCNC: 133 MEQ/L (ref 135–145)
WBC # BLD AUTO: 2.9 THOU/MM3 (ref 4.8–10.8)

## 2024-05-04 PROCEDURE — 6370000000 HC RX 637 (ALT 250 FOR IP): Performed by: PHYSICIAN ASSISTANT

## 2024-05-04 PROCEDURE — 94761 N-INVAS EAR/PLS OXIMETRY MLT: CPT

## 2024-05-04 PROCEDURE — 99239 HOSP IP/OBS DSCHRG MGMT >30: CPT | Performed by: PHYSICIAN ASSISTANT

## 2024-05-04 PROCEDURE — 85025 COMPLETE CBC W/AUTO DIFF WBC: CPT

## 2024-05-04 PROCEDURE — 36415 COLL VENOUS BLD VENIPUNCTURE: CPT

## 2024-05-04 PROCEDURE — 6370000000 HC RX 637 (ALT 250 FOR IP)

## 2024-05-04 PROCEDURE — 94640 AIRWAY INHALATION TREATMENT: CPT

## 2024-05-04 PROCEDURE — 80048 BASIC METABOLIC PNL TOTAL CA: CPT

## 2024-05-04 PROCEDURE — 2580000003 HC RX 258

## 2024-05-04 PROCEDURE — 6360000002 HC RX W HCPCS

## 2024-05-04 RX ORDER — ALBUTEROL SULFATE 90 UG/1
2 AEROSOL, METERED RESPIRATORY (INHALATION) EVERY 4 HOURS PRN
Qty: 18 G | Refills: 3 | Status: SHIPPED | OUTPATIENT
Start: 2024-05-04

## 2024-05-04 RX ORDER — ALBUTEROL SULFATE 90 UG/1
2 AEROSOL, METERED RESPIRATORY (INHALATION) EVERY 4 HOURS PRN
Status: DISCONTINUED | OUTPATIENT
Start: 2024-05-04 | End: 2024-05-04 | Stop reason: HOSPADM

## 2024-05-04 RX ADMIN — Medication 2000 UNITS: at 08:13

## 2024-05-04 RX ADMIN — Medication 100 MG: at 08:13

## 2024-05-04 RX ADMIN — ALBUTEROL SULFATE 2 PUFF: 90 AEROSOL, METERED RESPIRATORY (INHALATION) at 14:38

## 2024-05-04 RX ADMIN — PANTOPRAZOLE SODIUM 40 MG: 40 TABLET, DELAYED RELEASE ORAL at 05:05

## 2024-05-04 RX ADMIN — SODIUM CHLORIDE: 9 INJECTION, SOLUTION INTRAVENOUS at 05:07

## 2024-05-04 RX ADMIN — Medication 1 TABLET: at 08:12

## 2024-05-04 RX ADMIN — ENOXAPARIN SODIUM 40 MG: 100 INJECTION SUBCUTANEOUS at 08:12

## 2024-05-04 ASSESSMENT — PAIN SCALES - GENERAL: PAINLEVEL_OUTOF10: 8

## 2024-05-04 NOTE — PROGRESS NOTES
Discharge teaching and instructions for diagnosis/procedure of pre-syncope completed with patient using teachback method. AVS reviewed. Patient voiced understanding regarding prescriptions, follow up appointments, and care of self at home. Discharged ambulatory to  independent living per  [facility] cab.

## 2024-05-04 NOTE — PLAN OF CARE
Problem: Discharge Planning  Goal: Discharge to home or other facility with appropriate resources  5/3/2024 2316 by Ruben Steen, RN  Outcome: Progressing  5/3/2024 1408 by Michelle Villavicencio LSW  Outcome: Progressing     Problem: Safety - Adult  Goal: Free from fall injury  Outcome: Progressing     Problem: Chronic Conditions and Co-morbidities  Goal: Patient's chronic conditions and co-morbidity symptoms are monitored and maintained or improved  Outcome: Progressing

## 2024-05-04 NOTE — DISCHARGE INSTRUCTIONS
You were admitted for dizziness and hypotension (low blood pressure). You were treated with IVF and this improved. Your blood pressure lowering medications have been held and your blood pressure has been stable. Stop your amlodipine and Cardura at discharge. Follow up with your family doctor closely for continued blood pressure monitoring.   A referral was sent to Neurology for your dizziness. An Echocardiogram was done and normal.     Please take all of your hospital paperwork and medications to your follow up appointment. Follow up with your PCP in 5-7 days.   Thank you for allowing us to participate in your care.     Electronically signed by Latasha Caballero PA-C on 5/4/2024 at 3:13 PM    
distal

## 2024-05-04 NOTE — PLAN OF CARE
Problem: Discharge Planning  Goal: Discharge to home or other facility with appropriate resources  Outcome: Progressing  Flowsheets (Taken 5/4/2024 1508)  Discharge to home or other facility with appropriate resources: Identify barriers to discharge with patient and caregiver     Problem: Safety - Adult  Goal: Free from fall injury  Outcome: Progressing  Flowsheets (Taken 5/4/2024 1508)  Free From Fall Injury: Instruct family/caregiver on patient safety     Problem: Chronic Conditions and Co-morbidities  Goal: Patient's chronic conditions and co-morbidity symptoms are monitored and maintained or improved  Outcome: Progressing  Flowsheets (Taken 5/4/2024 1508)  Care Plan - Patient's Chronic Conditions and Co-Morbidity Symptoms are Monitored and Maintained or Improved:   Monitor and assess patient's chronic conditions and comorbid symptoms for stability, deterioration, or improvement   Collaborate with multidisciplinary team to address chronic and comorbid conditions and prevent exacerbation or deterioration   Update acute care plan with appropriate goals if chronic or comorbid symptoms are exacerbated and prevent overall improvement and discharge     Problem: Pain  Goal: Verbalizes/displays adequate comfort level or baseline comfort level  Outcome: Progressing  Flowsheets (Taken 5/4/2024 1508)  Verbalizes/displays adequate comfort level or baseline comfort level:   Encourage patient to monitor pain and request assistance   Assess pain using appropriate pain scale   Implement non-pharmacological measures as appropriate and evaluate response   Administer analgesics based on type and severity of pain and evaluate response   Care plan reviewed with patient.  Patient verbalizes understanding of the plan of care and contribute to goal setting.

## 2024-05-06 ENCOUNTER — TELEPHONE (OUTPATIENT)
Dept: INTERNAL MEDICINE CLINIC | Age: 69
End: 2024-05-06

## 2024-05-06 NOTE — TELEPHONE ENCOUNTER
Care Transitions Initial Follow Up Call    Outreach made within 2 business days of discharge: Yes    Patient: Ronn Ramires Patient : 1955   MRN: [id number]  Reason for Admission: Presyncope  Discharge Date: 24       Spoke with: Cj guzman, she will try and get a hold of him to call the office    Discharge department/facility: Psychiatric    Scheduled appointment with PCP within 7-14 days    Follow Up  No future appointments.    Floridalma Mccoy MA

## 2024-05-09 NOTE — DISCHARGE SUMMARY
[facility] [address]      Phone: [phone]   albuterol sulfate  (90 Base) MCG/ACT inhaler          Patient Instructions:    You were admitted for dizziness and hypotension (low blood pressure). You were treated with IVF and this improved. Your blood pressure lowering medications have been held and your blood pressure has been stable. Stop your amlodipine and Cardura at discharge. Follow up with your family doctor closely for continued blood pressure monitoring.   A referral was sent to Neurology for your dizziness. An Echocardiogram was done and normal.      Please take all of your hospital paperwork and medications to your follow up appointment. Follow up with your PCP in 5-7 days.   Thank you for allowing us to participate in your care.      Discharge lab work: bmp  Activity: activity as tolerated and no driving for today  Diet: No diet orders on file      Follow-up visits:   Camilo Guadalupe MD  [address] [address]  [phone]    Schedule an appointment as soon as possible for a visit in 1 week(s)  Call to schedule follow-up appointment to be seen within the next week from discharge from hospital stay.    Silvia Conde MD  [address] [address]  [phone]    Call in 1 week(s)  New patient referral. Call on Monday 5/6/24 to schedule a new patient appointment.         Disposition: home  Condition at Discharge: Stable    Time Spent: 45 minutes    Signed:    Thank you Camilo Guadalupe MD for the opportunity to be involved in this patient's care.    Electronically signed by Latasha Caballero PA-C on 5/9/2024 at 5:19 PM  Discharging Hospitalist

## 2024-07-12 ENCOUNTER — APPOINTMENT (OUTPATIENT)
Dept: GENERAL RADIOLOGY | Age: 69
End: 2024-07-12
Payer: MEDICARE

## 2024-07-12 ENCOUNTER — APPOINTMENT (OUTPATIENT)
Dept: CT IMAGING | Age: 69
End: 2024-07-12
Payer: MEDICARE

## 2024-07-12 ENCOUNTER — HOSPITAL ENCOUNTER (EMERGENCY)
Age: 69
Discharge: HOME | End: 2024-07-12
Attending: EMERGENCY MEDICINE
Payer: MEDICARE

## 2024-07-12 VITALS
SYSTOLIC BLOOD PRESSURE: 141 MMHG | WEIGHT: 140 LBS | OXYGEN SATURATION: 97 % | TEMPERATURE: 97.5 F | BODY MASS INDEX: 21.22 KG/M2 | RESPIRATION RATE: 18 BRPM | HEIGHT: 68 IN | HEART RATE: 80 BPM | DIASTOLIC BLOOD PRESSURE: 86 MMHG

## 2024-07-12 DIAGNOSIS — J44.1 COPD EXACERBATION (HCC): Primary | ICD-10-CM

## 2024-07-12 LAB
ANION GAP SERPL CALC-SCNC: 11 MEQ/L (ref 8–16)
APTT PPP: 34.4 SECONDS (ref 22–38)
BASE EXCESS BLDA CALC-SCNC: 1.9 MMOL/L (ref -2–3)
BASOPHILS ABSOLUTE: 0 THOU/MM3 (ref 0–0.1)
BASOPHILS NFR BLD AUTO: 0.8 %
BUN SERPL-MCNC: 11 MG/DL (ref 7–22)
CALCIUM SERPL-MCNC: 8.7 MG/DL (ref 8.5–10.5)
CHLORIDE SERPL-SCNC: 92 MEQ/L (ref 98–111)
CO2 SERPL-SCNC: 24 MEQ/L (ref 23–33)
COLLECTED BY:: ABNORMAL
CREAT SERPL-MCNC: 1.3 MG/DL (ref 0.4–1.2)
DEPRECATED RDW RBC AUTO: 48.2 FL (ref 35–45)
DEVICE: ABNORMAL
EKG ATRIAL RATE: 74 BPM
EKG P AXIS: 80 DEGREES
EKG P-R INTERVAL: 164 MS
EKG Q-T INTERVAL: 392 MS
EKG QRS DURATION: 86 MS
EKG QTC CALCULATION (BAZETT): 435 MS
EKG R AXIS: 78 DEGREES
EKG T AXIS: 70 DEGREES
EKG VENTRICULAR RATE: 74 BPM
EOSINOPHIL NFR BLD AUTO: 2.3 %
EOSINOPHILS ABSOLUTE: 0.1 THOU/MM3 (ref 0–0.4)
ERYTHROCYTE [DISTWIDTH] IN BLOOD BY AUTOMATED COUNT: 13 % (ref 11.5–14.5)
GFR SERPL CREATININE-BSD FRML MDRD: 59 ML/MIN/1.73M2
GLUCOSE SERPL-MCNC: 84 MG/DL (ref 70–108)
HCO3 BLDA-SCNC: 26 MMOL/L (ref 23–28)
HCT VFR BLD AUTO: 38.2 % (ref 42–52)
HGB BLD-MCNC: 13.1 GM/DL (ref 14–18)
IMM GRANULOCYTES # BLD AUTO: 0.02 THOU/MM3 (ref 0–0.07)
IMM GRANULOCYTES NFR BLD AUTO: 0.8 %
INR PPP: 1.12 (ref 0.85–1.13)
LYMPHOCYTES ABSOLUTE: 1.5 THOU/MM3 (ref 1–4.8)
LYMPHOCYTES NFR BLD AUTO: 56.9 %
MCH RBC QN AUTO: 34.3 PG (ref 26–33)
MCHC RBC AUTO-ENTMCNC: 34.3 GM/DL (ref 32.2–35.5)
MCV RBC AUTO: 100 FL (ref 80–94)
MONOCYTES ABSOLUTE: 0.4 THOU/MM3 (ref 0.4–1.3)
MONOCYTES NFR BLD AUTO: 14.5 %
NEUTROPHILS ABSOLUTE: 0.6 THOU/MM3 (ref 1.8–7.7)
NEUTROPHILS NFR BLD AUTO: 24.7 %
NRBC BLD AUTO-RTO: 0 /100 WBC
NT-PROBNP SERPL IA-MCNC: 85.4 PG/ML (ref 0–124)
OSMOLALITY SERPL CALC.SUM OF ELEC: 253.8 MOSMOL/KG (ref 275–300)
PCO2 TEMP ADJ BLDMV: 37 MMHG (ref 41–51)
PH BLDMV: 7.46 [PH] (ref 7.31–7.41)
PLATELET # BLD AUTO: 213 THOU/MM3 (ref 130–400)
PLATELET BLD QL SMEAR: ADEQUATE
PMV BLD AUTO: 9.8 FL (ref 9.4–12.4)
PO2 BLDMV: 38 MMHG (ref 25–40)
POTASSIUM SERPL-SCNC: 3.9 MEQ/L (ref 3.5–5.2)
RBC # BLD AUTO: 3.82 MILL/MM3 (ref 4.7–6.1)
REASON FOR REJECTION: NORMAL
REJECTED TEST: NORMAL
SAO2 % BLDMV: 75 %
SCAN OF BLOOD SMEAR: NORMAL
SITE: ABNORMAL
SODIUM SERPL-SCNC: 127 MEQ/L (ref 135–145)
TROPONIN, HIGH SENSITIVITY: 8 NG/L (ref 0–12)
VARIANT LYMPHS BLD QL SMEAR: ABNORMAL %
VENTILATION MODE VENT: ABNORMAL
WBC # BLD AUTO: 2.6 THOU/MM3 (ref 4.8–10.8)

## 2024-07-12 PROCEDURE — 93005 ELECTROCARDIOGRAM TRACING: CPT | Performed by: EMERGENCY MEDICINE

## 2024-07-12 PROCEDURE — 82803 BLOOD GASES ANY COMBINATION: CPT

## 2024-07-12 PROCEDURE — 80048 BASIC METABOLIC PNL TOTAL CA: CPT

## 2024-07-12 PROCEDURE — 6370000000 HC RX 637 (ALT 250 FOR IP): Performed by: EMERGENCY MEDICINE

## 2024-07-12 PROCEDURE — 85610 PROTHROMBIN TIME: CPT

## 2024-07-12 PROCEDURE — 93010 ELECTROCARDIOGRAM REPORT: CPT | Performed by: INTERNAL MEDICINE

## 2024-07-12 PROCEDURE — 99285 EMERGENCY DEPT VISIT HI MDM: CPT

## 2024-07-12 PROCEDURE — 2580000003 HC RX 258: Performed by: EMERGENCY MEDICINE

## 2024-07-12 PROCEDURE — 94640 AIRWAY INHALATION TREATMENT: CPT

## 2024-07-12 PROCEDURE — 85730 THROMBOPLASTIN TIME PARTIAL: CPT

## 2024-07-12 PROCEDURE — 84484 ASSAY OF TROPONIN QUANT: CPT

## 2024-07-12 PROCEDURE — 6360000002 HC RX W HCPCS: Performed by: EMERGENCY MEDICINE

## 2024-07-12 PROCEDURE — 6360000004 HC RX CONTRAST MEDICATION: Performed by: EMERGENCY MEDICINE

## 2024-07-12 PROCEDURE — 96374 THER/PROPH/DIAG INJ IV PUSH: CPT

## 2024-07-12 PROCEDURE — 36415 COLL VENOUS BLD VENIPUNCTURE: CPT

## 2024-07-12 PROCEDURE — 71275 CT ANGIOGRAPHY CHEST: CPT

## 2024-07-12 PROCEDURE — 83880 ASSAY OF NATRIURETIC PEPTIDE: CPT

## 2024-07-12 PROCEDURE — 85025 COMPLETE CBC W/AUTO DIFF WBC: CPT

## 2024-07-12 PROCEDURE — 71045 X-RAY EXAM CHEST 1 VIEW: CPT

## 2024-07-12 RX ORDER — AZITHROMYCIN 250 MG/1
TABLET, FILM COATED ORAL
Qty: 1 PACKET | Refills: 0 | Status: SHIPPED | OUTPATIENT
Start: 2024-07-12 | End: 2024-07-16

## 2024-07-12 RX ORDER — ALBUTEROL SULFATE 90 UG/1
2 AEROSOL, METERED RESPIRATORY (INHALATION) EVERY 4 HOURS PRN
Qty: 18 G | Refills: 3 | Status: SHIPPED | OUTPATIENT
Start: 2024-07-12

## 2024-07-12 RX ORDER — IPRATROPIUM BROMIDE AND ALBUTEROL SULFATE 2.5; .5 MG/3ML; MG/3ML
1 SOLUTION RESPIRATORY (INHALATION)
Status: COMPLETED | OUTPATIENT
Start: 2024-07-12 | End: 2024-07-12

## 2024-07-12 RX ORDER — PREDNISONE 50 MG/1
50 TABLET ORAL DAILY
Qty: 5 TABLET | Refills: 0 | Status: SHIPPED | OUTPATIENT
Start: 2024-07-12 | End: 2024-07-17

## 2024-07-12 RX ADMIN — IPRATROPIUM BROMIDE AND ALBUTEROL SULFATE 1 DOSE: .5; 3 SOLUTION RESPIRATORY (INHALATION) at 15:37

## 2024-07-12 RX ADMIN — IPRATROPIUM BROMIDE AND ALBUTEROL SULFATE 1 DOSE: .5; 3 SOLUTION RESPIRATORY (INHALATION) at 15:35

## 2024-07-12 RX ADMIN — IPRATROPIUM BROMIDE AND ALBUTEROL SULFATE 1 DOSE: .5; 3 SOLUTION RESPIRATORY (INHALATION) at 15:36

## 2024-07-12 RX ADMIN — IOPAMIDOL 80 ML: 755 INJECTION, SOLUTION INTRAVENOUS at 17:59

## 2024-07-12 RX ADMIN — WATER 125 MG: 1 INJECTION INTRAMUSCULAR; INTRAVENOUS; SUBCUTANEOUS at 15:35

## 2024-07-12 ASSESSMENT — ENCOUNTER SYMPTOMS
ABDOMINAL PAIN: 0
EYE PAIN: 0
RHINORRHEA: 0
SHORTNESS OF BREATH: 1
COUGH: 0
SORE THROAT: 0

## 2024-07-12 ASSESSMENT — PAIN DESCRIPTION - LOCATION: LOCATION: CHEST

## 2024-07-12 ASSESSMENT — PAIN SCALES - GENERAL: PAINLEVEL_OUTOF10: 10

## 2024-07-12 ASSESSMENT — PAIN - FUNCTIONAL ASSESSMENT: PAIN_FUNCTIONAL_ASSESSMENT: 0-10

## 2024-07-12 NOTE — ED PROVIDER NOTES
[facility] EMERGENCY DEPT  EMERGENCY DEPARTMENT ENCOUNTER      Pt Name: Ronn Ramires  MRN: [id number]  Birthdate 1955  Date of evaluation: 7/12/2024  Provider: Keyshawn Cannon DO  6:48 PM    CHIEF COMPLAINT       Chief Complaint   Patient presents with    Shortness of Breath    Chest Pain    Medication Refill         HISTORY OF PRESENT ILLNESS        Ronn Ramires is a 68 y.o. male who presents with a CC of SOB starting today.  He ran out of his albuterol, has a history of COPD and still smokes.  Cannot identify any other inciting factors, no aggravating or alleviating factors.  Timing is constant.            Nursing Notes were reviewed.    REVIEW OF SYSTEMS       Review of Systems   Constitutional:  Negative for chills and fever.   HENT:  Negative for rhinorrhea and sore throat.    Eyes:  Negative for pain and visual disturbance.   Respiratory:  Positive for shortness of breath. Negative for cough.    Cardiovascular:  Positive for chest pain.   Gastrointestinal:  Negative for abdominal pain.   Endocrine: Negative for polydipsia and polyuria.   Genitourinary:  Negative for dysuria and flank pain.   Musculoskeletal:  Negative for arthralgias and myalgias.   Skin:  Negative for rash and wound.   Neurological:  Negative for dizziness, weakness and light-headedness.   Psychiatric/Behavioral:  Negative for suicidal ideas.        Except as noted above the remainder of the review of systems was reviewed and negative.       PAST MEDICAL HISTORY     Past Medical History:   Diagnosis Date    Asthma     Cerebral artery occlusion with cerebral infarction (HCC)     Chronic kidney disease     Community acquired pneumonia 1/7/2024    Diarrhea     Hemodialysis patient (HCC)     previous    Hypertension     Liver disease     Psychiatric problem          SURGICAL HISTORY       Past Surgical History:   Procedure Laterality Date    COLONOSCOPY           CURRENT MEDICATIONS       Previous Medications    FLUTICASONE

## 2024-07-12 NOTE — ED NOTES
Medication administered per MAR. Patient resting in bed. Respirations easy and unlabored. No distress noted. Call light within reach. Respiratory at bedside for breathing tx.

## 2024-10-17 ENCOUNTER — HOSPITAL ENCOUNTER (EMERGENCY)
Age: 69
Discharge: HOME | End: 2024-10-17
Attending: EMERGENCY MEDICINE
Payer: MEDICARE

## 2024-10-17 ENCOUNTER — APPOINTMENT (OUTPATIENT)
Dept: GENERAL RADIOLOGY | Age: 69
End: 2024-10-17
Payer: MEDICARE

## 2024-10-17 VITALS
TEMPERATURE: 97.5 F | OXYGEN SATURATION: 98 % | RESPIRATION RATE: 19 BRPM | SYSTOLIC BLOOD PRESSURE: 141 MMHG | HEART RATE: 84 BPM | DIASTOLIC BLOOD PRESSURE: 90 MMHG

## 2024-10-17 DIAGNOSIS — J06.9 ACUTE UPPER RESPIRATORY INFECTION: Primary | ICD-10-CM

## 2024-10-17 DIAGNOSIS — J44.1 COPD EXACERBATION (HCC): ICD-10-CM

## 2024-10-17 DIAGNOSIS — Z59.00 HOMELESSNESS: ICD-10-CM

## 2024-10-17 DIAGNOSIS — D50.8 IRON DEFICIENCY ANEMIA SECONDARY TO INADEQUATE DIETARY IRON INTAKE: ICD-10-CM

## 2024-10-17 LAB
ALBUMIN SERPL BCG-MCNC: 4 G/DL (ref 3.5–5.1)
ALP SERPL-CCNC: 66 U/L (ref 38–126)
ALT SERPL W/O P-5'-P-CCNC: 47 U/L (ref 11–66)
ANION GAP SERPL CALC-SCNC: 13 MEQ/L (ref 8–16)
AST SERPL-CCNC: 101 U/L (ref 5–40)
BASOPHILS ABSOLUTE: 0 THOU/MM3 (ref 0–0.1)
BASOPHILS NFR BLD AUTO: 0.4 %
BILIRUB SERPL-MCNC: 0.6 MG/DL (ref 0.3–1.2)
BUN SERPL-MCNC: 9 MG/DL (ref 7–22)
CALCIUM SERPL-MCNC: 8.9 MG/DL (ref 8.5–10.5)
CHLORIDE SERPL-SCNC: 93 MEQ/L (ref 98–111)
CO2 SERPL-SCNC: 26 MEQ/L (ref 23–33)
CREAT SERPL-MCNC: 0.9 MG/DL (ref 0.4–1.2)
DEPRECATED RDW RBC AUTO: 47.4 FL (ref 35–45)
EKG ATRIAL RATE: 82 BPM
EKG P AXIS: 80 DEGREES
EKG P-R INTERVAL: 162 MS
EKG Q-T INTERVAL: 380 MS
EKG QRS DURATION: 84 MS
EKG QTC CALCULATION (BAZETT): 443 MS
EKG R AXIS: 77 DEGREES
EKG T AXIS: 69 DEGREES
EKG VENTRICULAR RATE: 82 BPM
EOSINOPHIL NFR BLD AUTO: 0.9 %
EOSINOPHILS ABSOLUTE: 0 THOU/MM3 (ref 0–0.4)
ERYTHROCYTE [DISTWIDTH] IN BLOOD BY AUTOMATED COUNT: 12.5 % (ref 11.5–14.5)
ETHANOL SERPL-MCNC: < 0.01 % (ref 0–0.08)
FLUAV RNA RESP QL NAA+PROBE: NOT DETECTED
FLUBV RNA RESP QL NAA+PROBE: NOT DETECTED
GFR SERPL CREATININE-BSD FRML MDRD: > 90 ML/MIN/1.73M2
GLUCOSE SERPL-MCNC: 81 MG/DL (ref 70–108)
HCT VFR BLD AUTO: 39.9 % (ref 42–52)
HGB BLD-MCNC: 13.5 GM/DL (ref 14–18)
IMM GRANULOCYTES # BLD AUTO: 0.01 THOU/MM3 (ref 0–0.07)
IMM GRANULOCYTES NFR BLD AUTO: 0.4 %
LYMPHOCYTES ABSOLUTE: 1.3 THOU/MM3 (ref 1–4.8)
LYMPHOCYTES NFR BLD AUTO: 55.8 %
MCH RBC QN AUTO: 34.3 PG (ref 26–33)
MCHC RBC AUTO-ENTMCNC: 33.8 GM/DL (ref 32.2–35.5)
MCV RBC AUTO: 101.3 FL (ref 80–94)
MONOCYTES ABSOLUTE: 0.3 THOU/MM3 (ref 0.4–1.3)
MONOCYTES NFR BLD AUTO: 15 %
NEUTROPHILS ABSOLUTE: 0.6 THOU/MM3 (ref 1.8–7.7)
NEUTROPHILS NFR BLD AUTO: 27.5 %
NRBC BLD AUTO-RTO: 0 /100 WBC
OSMOLALITY SERPL CALC.SUM OF ELEC: 262.2 MOSMOL/KG (ref 275–300)
PLATELET # BLD AUTO: 156 THOU/MM3 (ref 130–400)
PLATELET BLD QL SMEAR: ADEQUATE
PMV BLD AUTO: 9.9 FL (ref 9.4–12.4)
POTASSIUM SERPL-SCNC: 3.9 MEQ/L (ref 3.5–5.2)
PROT SERPL-MCNC: 7.4 G/DL (ref 6.1–8)
RBC # BLD AUTO: 3.94 MILL/MM3 (ref 4.7–6.1)
SARS-COV-2 RNA RESP QL NAA+PROBE: NOT DETECTED
SCAN OF BLOOD SMEAR: NORMAL
SODIUM SERPL-SCNC: 132 MEQ/L (ref 135–145)
TARGETS BLD QL SMEAR: ABNORMAL
TROPONIN, HIGH SENSITIVITY: 9 NG/L (ref 0–12)
VARIANT LYMPHS BLD QL SMEAR: ABNORMAL %
WBC # BLD AUTO: 2.3 THOU/MM3 (ref 4.8–10.8)

## 2024-10-17 PROCEDURE — 6370000000 HC RX 637 (ALT 250 FOR IP): Performed by: EMERGENCY MEDICINE

## 2024-10-17 PROCEDURE — 82077 ASSAY SPEC XCP UR&BREATH IA: CPT

## 2024-10-17 PROCEDURE — 71045 X-RAY EXAM CHEST 1 VIEW: CPT

## 2024-10-17 PROCEDURE — 36415 COLL VENOUS BLD VENIPUNCTURE: CPT

## 2024-10-17 PROCEDURE — 85025 COMPLETE CBC W/AUTO DIFF WBC: CPT

## 2024-10-17 PROCEDURE — 87636 SARSCOV2 & INF A&B AMP PRB: CPT

## 2024-10-17 PROCEDURE — 80053 COMPREHEN METABOLIC PANEL: CPT

## 2024-10-17 PROCEDURE — 93005 ELECTROCARDIOGRAM TRACING: CPT | Performed by: EMERGENCY MEDICINE

## 2024-10-17 PROCEDURE — 99285 EMERGENCY DEPT VISIT HI MDM: CPT

## 2024-10-17 PROCEDURE — 93010 ELECTROCARDIOGRAM REPORT: CPT | Performed by: INTERNAL MEDICINE

## 2024-10-17 PROCEDURE — 94640 AIRWAY INHALATION TREATMENT: CPT

## 2024-10-17 PROCEDURE — 6360000002 HC RX W HCPCS: Performed by: EMERGENCY MEDICINE

## 2024-10-17 PROCEDURE — 84484 ASSAY OF TROPONIN QUANT: CPT

## 2024-10-17 RX ORDER — MULTIVITAMIN WITH IRON
1 TABLET ORAL DAILY
Qty: 30 TABLET | Refills: 0 | Status: SHIPPED | OUTPATIENT
Start: 2024-10-17 | End: 2024-11-16

## 2024-10-17 RX ORDER — LANOLIN ALCOHOL/MO/W.PET/CERES
100 CREAM (GRAM) TOPICAL DAILY
Qty: 30 TABLET | Refills: 3 | Status: SHIPPED | OUTPATIENT
Start: 2024-10-17

## 2024-10-17 RX ORDER — ALBUTEROL SULFATE 90 UG/1
2 INHALANT RESPIRATORY (INHALATION) EVERY 4 HOURS PRN
Qty: 18 G | Refills: 3 | Status: SHIPPED | OUTPATIENT
Start: 2024-10-17

## 2024-10-17 RX ORDER — IPRATROPIUM BROMIDE AND ALBUTEROL SULFATE 2.5; .5 MG/3ML; MG/3ML
1 SOLUTION RESPIRATORY (INHALATION) ONCE
Status: COMPLETED | OUTPATIENT
Start: 2024-10-17 | End: 2024-10-17

## 2024-10-17 RX ORDER — ALBUTEROL SULFATE 5 MG/ML
7.5 SOLUTION RESPIRATORY (INHALATION) ONCE
Status: COMPLETED | OUTPATIENT
Start: 2024-10-17 | End: 2024-10-17

## 2024-10-17 RX ORDER — PREDNISONE 20 MG/1
40 TABLET ORAL ONCE
Status: COMPLETED | OUTPATIENT
Start: 2024-10-17 | End: 2024-10-17

## 2024-10-17 RX ORDER — FERROUS SULFATE 325(65) MG
325 TABLET ORAL
Qty: 90 TABLET | Refills: 1 | Status: SHIPPED | OUTPATIENT
Start: 2024-10-17

## 2024-10-17 RX ORDER — PREDNISONE 20 MG/1
40 TABLET ORAL DAILY
Qty: 8 TABLET | Refills: 0 | Status: SHIPPED | OUTPATIENT
Start: 2024-10-17 | End: 2024-10-21

## 2024-10-17 RX ADMIN — ALBUTEROL SULFATE 7.5 MG: 2.5 SOLUTION RESPIRATORY (INHALATION) at 13:05

## 2024-10-17 RX ADMIN — IPRATROPIUM BROMIDE AND ALBUTEROL SULFATE 1 DOSE: .5; 3 SOLUTION RESPIRATORY (INHALATION) at 13:17

## 2024-10-17 RX ADMIN — PREDNISONE 40 MG: 20 TABLET ORAL at 14:23

## 2024-10-17 SDOH — ECONOMIC STABILITY - HOUSING INSECURITY: HOMELESSNESS UNSPECIFIED: Z59.00

## 2024-10-17 ASSESSMENT — PAIN - FUNCTIONAL ASSESSMENT: PAIN_FUNCTIONAL_ASSESSMENT: NONE - DENIES PAIN

## 2024-10-17 NOTE — ED PROVIDER NOTES
interpreted by me in the clinical context of this patient.  All EKGs are also interpreted by our Cardiology department, final interpretation may not be available as of the writing of this note.      PREVIOUS RECORDS  AND EXTERNAL INFORMATION REVIEWED   History obtained from: chart review and the patient.    Pertinent previous and/or external records reviewed:  History alcohol dependence, history of stroke, CHF, cocaine use, alcohol use, hepatitis C .    Case discussed with specialties other than Emergency Medicine: Not Applicable      MEDICAL DECISION MAKING   Initial Assessment Summary:   68 years old homeless male with shortness of breath similar to previous COPD exacerbations.  Please see ED course and MDM sections below for continuation and resolution of this initial assessment if applicable.    Initial plan:   IV line, labs  Nebulized medication  Steroids  Imaging  Observation in the ED while awaiting results    Comorbid conditions pertinent to this ED encounter:  Alcohol dependence, stroke, CHF, polysubstance abuse, hepatitis C, COPD      Differential Diagnosis includes but is not limited to:  Acute COPD exacerbation  Rule out ACS      Decision Rules/Clinical Scores utilized:  Not Applicable       Code Status:  Not addressed during this ED visit    Social determinants of health impacting treatment or disposition:  Older age, Race/ethnicity, Income, employment, and poverty, Safe living environment, Substance abuse, Access to healthcare services, and Adequate community infrastructure, which can ensure public safety, allow access to media, and promote wellness      Medical Decision Making  Problems Addressed:  Acute upper respiratory infection: acute illness or injury  COPD exacerbation (HCC): acute illness or injury    Amount and/or Complexity of Data Reviewed  External Data Reviewed: labs, radiology, ECG and notes.  Labs: ordered.  Radiology: ordered and independent interpretation performed.  ECG/medicine

## 2024-10-17 NOTE — CARE COORDINATION
Patient provided with listing of local shelters and food pantries in area. Patient advised he is familiar with shelters and is not allowed in some related to drinking. Denied needs or concerns at this time.

## 2024-10-17 NOTE — ED TRIAGE NOTES
Pt to ED via EMS from [facility]. Pt reports feeling SOB and having sudden onset of CP. Pt reports they were sitting there after breakfast when the pain began. Pt has extensive medical hx including htn, stroke and COPD. Pt reports SOB is worse now. Pt also reports consuming 1  beer this AM. He reports he is homeless and has been \"camping out\" outside. Pt bp hypertensive. EKG completed.

## 2024-11-08 ENCOUNTER — HOSPITAL ENCOUNTER (EMERGENCY)
Age: 69
Discharge: HOME | End: 2024-11-08
Payer: MEDICARE

## 2024-11-08 VITALS
SYSTOLIC BLOOD PRESSURE: 143 MMHG | HEART RATE: 83 BPM | DIASTOLIC BLOOD PRESSURE: 103 MMHG | TEMPERATURE: 98.2 F | OXYGEN SATURATION: 97 % | RESPIRATION RATE: 18 BRPM

## 2024-11-08 DIAGNOSIS — Z76.0 ENCOUNTER FOR MEDICATION REFILL: Primary | ICD-10-CM

## 2024-11-08 PROCEDURE — 6370000000 HC RX 637 (ALT 250 FOR IP)

## 2024-11-08 PROCEDURE — 99283 EMERGENCY DEPT VISIT LOW MDM: CPT

## 2024-11-08 RX ORDER — ALBUTEROL SULFATE 90 UG/1
2 INHALANT RESPIRATORY (INHALATION) ONCE
Status: COMPLETED | OUTPATIENT
Start: 2024-11-08 | End: 2024-11-08

## 2024-11-08 RX ADMIN — ALBUTEROL SULFATE 2 PUFF: 90 AEROSOL, METERED RESPIRATORY (INHALATION) at 20:13

## 2024-11-08 ASSESSMENT — PAIN - FUNCTIONAL ASSESSMENT: PAIN_FUNCTIONAL_ASSESSMENT: NONE - DENIES PAIN
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
## 2024-11-09 NOTE — DISCHARGE INSTRUCTIONS
Please take your albuterol as prescribed.  Follow-up with primary care to monitor and prescribe further inhalers.    Return to the emergency department for any worsening shortness of breath, chest pain, or any other care concern.

## 2024-11-14 ENCOUNTER — APPOINTMENT (OUTPATIENT)
Dept: CT IMAGING | Age: 69
End: 2024-11-14
Payer: MEDICARE

## 2024-11-14 ENCOUNTER — APPOINTMENT (OUTPATIENT)
Dept: GENERAL RADIOLOGY | Age: 69
End: 2024-11-14
Payer: MEDICARE

## 2024-11-14 ENCOUNTER — HOSPITAL ENCOUNTER (OUTPATIENT)
Age: 69
Setting detail: OBSERVATION
Discharge: OTHER INSTITUTION | End: 2024-11-19
Attending: EMERGENCY MEDICINE | Admitting: PHYSICIAN ASSISTANT
Payer: MEDICARE

## 2024-11-14 DIAGNOSIS — J44.1 COPD EXACERBATION (HCC): ICD-10-CM

## 2024-11-14 DIAGNOSIS — R07.9 CHEST PAIN, UNSPECIFIED TYPE: Primary | ICD-10-CM

## 2024-11-14 LAB
ANION GAP SERPL CALC-SCNC: 15 MEQ/L (ref 8–16)
BASOPHILS ABSOLUTE: 0 THOU/MM3 (ref 0–0.1)
BASOPHILS NFR BLD AUTO: 0.7 %
BUN SERPL-MCNC: 15 MG/DL (ref 7–22)
CALCIUM SERPL-MCNC: 8.7 MG/DL (ref 8.5–10.5)
CHLORIDE SERPL-SCNC: 98 MEQ/L (ref 98–111)
CO2 SERPL-SCNC: 19 MEQ/L (ref 23–33)
CREAT SERPL-MCNC: 1.2 MG/DL (ref 0.4–1.2)
DEPRECATED RDW RBC AUTO: 48.1 FL (ref 35–45)
EOSINOPHIL NFR BLD AUTO: 1.4 %
EOSINOPHILS ABSOLUTE: 0 THOU/MM3 (ref 0–0.4)
ERYTHROCYTE [DISTWIDTH] IN BLOOD BY AUTOMATED COUNT: 12.5 % (ref 11.5–14.5)
FLUAV RNA RESP QL NAA+PROBE: NOT DETECTED
FLUBV RNA RESP QL NAA+PROBE: NOT DETECTED
GFR SERPL CREATININE-BSD FRML MDRD: 65 ML/MIN/1.73M2
GLUCOSE SERPL-MCNC: 90 MG/DL (ref 70–108)
HCT VFR BLD AUTO: 37.4 % (ref 42–52)
HGB BLD-MCNC: 12.3 GM/DL (ref 14–18)
IMM GRANULOCYTES # BLD AUTO: 0.01 THOU/MM3 (ref 0–0.07)
IMM GRANULOCYTES NFR BLD AUTO: 0.4 %
LYMPHOCYTES ABSOLUTE: 1.6 THOU/MM3 (ref 1–4.8)
LYMPHOCYTES NFR BLD AUTO: 56.9 %
MCH RBC QN AUTO: 34.4 PG (ref 26–33)
MCHC RBC AUTO-ENTMCNC: 32.9 GM/DL (ref 32.2–35.5)
MCV RBC AUTO: 104.5 FL (ref 80–94)
MONOCYTES ABSOLUTE: 0.4 THOU/MM3 (ref 0.4–1.3)
MONOCYTES NFR BLD AUTO: 13.8 %
NEUTROPHILS ABSOLUTE: 0.8 THOU/MM3 (ref 1.8–7.7)
NEUTROPHILS NFR BLD AUTO: 26.8 %
NRBC BLD AUTO-RTO: 0 /100 WBC
NT-PROBNP SERPL IA-MCNC: 155.1 PG/ML (ref 0–124)
OSMOLALITY SERPL CALC.SUM OF ELEC: 264.9 MOSMOL/KG (ref 275–300)
PLATELET # BLD AUTO: 160 THOU/MM3 (ref 130–400)
PLATELET BLD QL SMEAR: ADEQUATE
PMV BLD AUTO: 10 FL (ref 9.4–12.4)
POTASSIUM SERPL-SCNC: 3.7 MEQ/L (ref 3.5–5.2)
RBC # BLD AUTO: 3.58 MILL/MM3 (ref 4.7–6.1)
SARS-COV-2 RNA RESP QL NAA+PROBE: NOT DETECTED
SCAN OF BLOOD SMEAR: NORMAL
SODIUM SERPL-SCNC: 132 MEQ/L (ref 135–145)
TROPONIN, HIGH SENSITIVITY: 10 NG/L (ref 0–12)
TROPONIN, HIGH SENSITIVITY: 10 NG/L (ref 0–12)
VARIANT LYMPHS BLD QL SMEAR: ABNORMAL %
WBC # BLD AUTO: 2.8 THOU/MM3 (ref 4.8–10.8)

## 2024-11-14 PROCEDURE — 83880 ASSAY OF NATRIURETIC PEPTIDE: CPT

## 2024-11-14 PROCEDURE — 96374 THER/PROPH/DIAG INJ IV PUSH: CPT

## 2024-11-14 PROCEDURE — 96365 THER/PROPH/DIAG IV INF INIT: CPT

## 2024-11-14 PROCEDURE — 71046 X-RAY EXAM CHEST 2 VIEWS: CPT

## 2024-11-14 PROCEDURE — 1200000003 HC TELEMETRY R&B

## 2024-11-14 PROCEDURE — 93005 ELECTROCARDIOGRAM TRACING: CPT | Performed by: NURSE PRACTITIONER

## 2024-11-14 PROCEDURE — 6370000000 HC RX 637 (ALT 250 FOR IP): Performed by: NURSE PRACTITIONER

## 2024-11-14 PROCEDURE — 85025 COMPLETE CBC W/AUTO DIFF WBC: CPT

## 2024-11-14 PROCEDURE — 96375 TX/PRO/DX INJ NEW DRUG ADDON: CPT

## 2024-11-14 PROCEDURE — 2580000003 HC RX 258: Performed by: PHYSICIAN ASSISTANT

## 2024-11-14 PROCEDURE — 6360000002 HC RX W HCPCS: Performed by: NURSE PRACTITIONER

## 2024-11-14 PROCEDURE — 80048 BASIC METABOLIC PNL TOTAL CA: CPT

## 2024-11-14 PROCEDURE — 70450 CT HEAD/BRAIN W/O DYE: CPT

## 2024-11-14 PROCEDURE — G0378 HOSPITAL OBSERVATION PER HR: HCPCS

## 2024-11-14 PROCEDURE — 6360000002 HC RX W HCPCS: Performed by: PHYSICIAN ASSISTANT

## 2024-11-14 PROCEDURE — 87636 SARSCOV2 & INF A&B AMP PRB: CPT

## 2024-11-14 PROCEDURE — 6360000002 HC RX W HCPCS: Performed by: EMERGENCY MEDICINE

## 2024-11-14 PROCEDURE — 94640 AIRWAY INHALATION TREATMENT: CPT

## 2024-11-14 PROCEDURE — 99222 1ST HOSP IP/OBS MODERATE 55: CPT | Performed by: PHYSICIAN ASSISTANT

## 2024-11-14 PROCEDURE — 84484 ASSAY OF TROPONIN QUANT: CPT

## 2024-11-14 PROCEDURE — 36415 COLL VENOUS BLD VENIPUNCTURE: CPT

## 2024-11-14 PROCEDURE — 6370000000 HC RX 637 (ALT 250 FOR IP): Performed by: PHYSICIAN ASSISTANT

## 2024-11-14 PROCEDURE — 93005 ELECTROCARDIOGRAM TRACING: CPT | Performed by: EMERGENCY MEDICINE

## 2024-11-14 PROCEDURE — 99285 EMERGENCY DEPT VISIT HI MDM: CPT

## 2024-11-14 RX ORDER — ONDANSETRON 2 MG/ML
4 INJECTION INTRAMUSCULAR; INTRAVENOUS EVERY 6 HOURS PRN
Status: DISCONTINUED | OUTPATIENT
Start: 2024-11-14 | End: 2024-11-19 | Stop reason: HOSPADM

## 2024-11-14 RX ORDER — POLYETHYLENE GLYCOL 3350 17 G/17G
17 POWDER, FOR SOLUTION ORAL DAILY PRN
Status: DISCONTINUED | OUTPATIENT
Start: 2024-11-14 | End: 2024-11-19 | Stop reason: HOSPADM

## 2024-11-14 RX ORDER — SODIUM CHLORIDE 0.9 % (FLUSH) 0.9 %
10 SYRINGE (ML) INJECTION EVERY 12 HOURS SCHEDULED
Status: DISCONTINUED | OUTPATIENT
Start: 2024-11-14 | End: 2024-11-19 | Stop reason: HOSPADM

## 2024-11-14 RX ORDER — VITAMIN B COMPLEX
2000 TABLET ORAL DAILY
Status: DISCONTINUED | OUTPATIENT
Start: 2024-11-15 | End: 2024-11-19 | Stop reason: HOSPADM

## 2024-11-14 RX ORDER — IPRATROPIUM BROMIDE AND ALBUTEROL SULFATE 2.5; .5 MG/3ML; MG/3ML
1 SOLUTION RESPIRATORY (INHALATION) ONCE
Status: COMPLETED | OUTPATIENT
Start: 2024-11-14 | End: 2024-11-14

## 2024-11-14 RX ORDER — ENOXAPARIN SODIUM 100 MG/ML
40 INJECTION SUBCUTANEOUS DAILY
Status: DISCONTINUED | OUTPATIENT
Start: 2024-11-15 | End: 2024-11-19 | Stop reason: HOSPADM

## 2024-11-14 RX ORDER — NITROGLYCERIN 20 MG/100ML
5-200 INJECTION INTRAVENOUS CONTINUOUS
Status: DISCONTINUED | OUTPATIENT
Start: 2024-11-14 | End: 2024-11-14

## 2024-11-14 RX ORDER — MORPHINE SULFATE 4 MG/ML
4 INJECTION, SOLUTION INTRAMUSCULAR; INTRAVENOUS ONCE
Status: COMPLETED | OUTPATIENT
Start: 2024-11-14 | End: 2024-11-14

## 2024-11-14 RX ORDER — ACETAMINOPHEN 325 MG/1
650 TABLET ORAL EVERY 6 HOURS PRN
Status: DISCONTINUED | OUTPATIENT
Start: 2024-11-14 | End: 2024-11-19 | Stop reason: HOSPADM

## 2024-11-14 RX ORDER — POTASSIUM CHLORIDE 1500 MG/1
40 TABLET, EXTENDED RELEASE ORAL PRN
Status: DISCONTINUED | OUTPATIENT
Start: 2024-11-14 | End: 2024-11-19 | Stop reason: HOSPADM

## 2024-11-14 RX ORDER — SODIUM CHLORIDE 0.9 % (FLUSH) 0.9 %
10 SYRINGE (ML) INJECTION PRN
Status: DISCONTINUED | OUTPATIENT
Start: 2024-11-14 | End: 2024-11-19 | Stop reason: HOSPADM

## 2024-11-14 RX ORDER — HYDROXYZINE HYDROCHLORIDE 25 MG/1
25 TABLET, FILM COATED ORAL ONCE
Status: COMPLETED | OUTPATIENT
Start: 2024-11-15 | End: 2024-11-15

## 2024-11-14 RX ORDER — FLUTICASONE PROPIONATE 50 MCG
1 SPRAY, SUSPENSION (ML) NASAL DAILY
Status: DISCONTINUED | OUTPATIENT
Start: 2024-11-15 | End: 2024-11-19 | Stop reason: HOSPADM

## 2024-11-14 RX ORDER — ROPINIROLE 0.5 MG/1
0.5 TABLET, FILM COATED ORAL ONCE
Status: COMPLETED | OUTPATIENT
Start: 2024-11-15 | End: 2024-11-15

## 2024-11-14 RX ORDER — LANOLIN ALCOHOL/MO/W.PET/CERES
100 CREAM (GRAM) TOPICAL DAILY
Status: DISCONTINUED | OUTPATIENT
Start: 2024-11-15 | End: 2024-11-19 | Stop reason: HOSPADM

## 2024-11-14 RX ORDER — FERROUS SULFATE 325(65) MG
325 TABLET ORAL
Status: DISCONTINUED | OUTPATIENT
Start: 2024-11-15 | End: 2024-11-19 | Stop reason: HOSPADM

## 2024-11-14 RX ORDER — MULTIVITAMIN WITH IRON
1 TABLET ORAL DAILY
Status: DISCONTINUED | OUTPATIENT
Start: 2024-11-15 | End: 2024-11-19 | Stop reason: HOSPADM

## 2024-11-14 RX ORDER — ALBUTEROL SULFATE 90 UG/1
2 INHALANT RESPIRATORY (INHALATION) EVERY 6 HOURS PRN
Status: DISCONTINUED | OUTPATIENT
Start: 2024-11-14 | End: 2024-11-15

## 2024-11-14 RX ORDER — PREDNISONE 20 MG/1
40 TABLET ORAL DAILY
Status: COMPLETED | OUTPATIENT
Start: 2024-11-15 | End: 2024-11-18

## 2024-11-14 RX ORDER — SODIUM CHLORIDE 9 MG/ML
INJECTION, SOLUTION INTRAVENOUS PRN
Status: DISCONTINUED | OUTPATIENT
Start: 2024-11-14 | End: 2024-11-19 | Stop reason: HOSPADM

## 2024-11-14 RX ORDER — ACETAMINOPHEN 650 MG/1
650 SUPPOSITORY RECTAL EVERY 6 HOURS PRN
Status: DISCONTINUED | OUTPATIENT
Start: 2024-11-14 | End: 2024-11-19 | Stop reason: HOSPADM

## 2024-11-14 RX ORDER — MAGNESIUM SULFATE IN WATER 40 MG/ML
2000 INJECTION, SOLUTION INTRAVENOUS PRN
Status: DISCONTINUED | OUTPATIENT
Start: 2024-11-14 | End: 2024-11-19 | Stop reason: HOSPADM

## 2024-11-14 RX ORDER — POTASSIUM CHLORIDE 7.45 MG/ML
10 INJECTION INTRAVENOUS PRN
Status: DISCONTINUED | OUTPATIENT
Start: 2024-11-14 | End: 2024-11-14 | Stop reason: RX

## 2024-11-14 RX ORDER — ONDANSETRON 4 MG/1
4 TABLET, ORALLY DISINTEGRATING ORAL EVERY 8 HOURS PRN
Status: DISCONTINUED | OUTPATIENT
Start: 2024-11-14 | End: 2024-11-19 | Stop reason: HOSPADM

## 2024-11-14 RX ORDER — NITROGLYCERIN 0.4 MG/1
0.4 TABLET SUBLINGUAL EVERY 5 MIN PRN
Status: DISCONTINUED | OUTPATIENT
Start: 2024-11-14 | End: 2024-11-19 | Stop reason: HOSPADM

## 2024-11-14 RX ORDER — ASPIRIN 81 MG/1
324 TABLET, CHEWABLE ORAL ONCE
Status: COMPLETED | OUTPATIENT
Start: 2024-11-14 | End: 2024-11-14

## 2024-11-14 RX ADMIN — IPRATROPIUM BROMIDE AND ALBUTEROL SULFATE 1 DOSE: .5; 3 SOLUTION RESPIRATORY (INHALATION) at 17:31

## 2024-11-14 RX ADMIN — ASPIRIN 81 MG 324 MG: 81 TABLET ORAL at 18:45

## 2024-11-14 RX ADMIN — WATER 125 MG: 1 INJECTION INTRAMUSCULAR; INTRAVENOUS; SUBCUTANEOUS at 17:06

## 2024-11-14 RX ADMIN — NITROGLYCERIN 5 MCG/MIN: 20 INJECTION INTRAVENOUS at 18:47

## 2024-11-14 RX ADMIN — MORPHINE SULFATE 4 MG: 4 INJECTION, SOLUTION INTRAMUSCULAR; INTRAVENOUS at 19:57

## 2024-11-14 ASSESSMENT — PAIN SCALES - GENERAL
PAINLEVEL_OUTOF10: 8
PAINLEVEL_OUTOF10: 10

## 2024-11-14 ASSESSMENT — PAIN - FUNCTIONAL ASSESSMENT: PAIN_FUNCTIONAL_ASSESSMENT: 0-10

## 2024-11-14 ASSESSMENT — PAIN DESCRIPTION - DESCRIPTORS
DESCRIPTORS: PRESSURE
DESCRIPTORS: PRESSURE

## 2024-11-14 ASSESSMENT — PAIN DESCRIPTION - LOCATION
LOCATION: CHEST

## 2024-11-14 NOTE — ED TRIAGE NOTES
Patient presents to the ed with c/o sob that is causing bilateral side pain in the ribs. Pt states that he is out of his inhaler. Pt rating pain 10/10 at this time.

## 2024-11-15 PROBLEM — R07.9 CHEST PAIN: Status: ACTIVE | Noted: 2024-11-15

## 2024-11-15 PROBLEM — G25.81 RLS (RESTLESS LEGS SYNDROME): Status: ACTIVE | Noted: 2024-11-15

## 2024-11-15 PROBLEM — D50.9 IRON DEFICIENCY ANEMIA: Status: ACTIVE | Noted: 2024-11-15

## 2024-11-15 PROBLEM — D72.819 LEUKOPENIA: Status: ACTIVE | Noted: 2024-11-15

## 2024-11-15 PROCEDURE — 6370000000 HC RX 637 (ALT 250 FOR IP): Performed by: PHYSICIAN ASSISTANT

## 2024-11-15 PROCEDURE — 94640 AIRWAY INHALATION TREATMENT: CPT

## 2024-11-15 PROCEDURE — 1200000003 HC TELEMETRY R&B

## 2024-11-15 PROCEDURE — G0378 HOSPITAL OBSERVATION PER HR: HCPCS

## 2024-11-15 PROCEDURE — 2580000003 HC RX 258: Performed by: PHYSICIAN ASSISTANT

## 2024-11-15 PROCEDURE — 99232 SBSQ HOSP IP/OBS MODERATE 35: CPT | Performed by: PHYSICIAN ASSISTANT

## 2024-11-15 PROCEDURE — 94760 N-INVAS EAR/PLS OXIMETRY 1: CPT

## 2024-11-15 RX ORDER — ALBUTEROL SULFATE 90 UG/1
2 INHALANT RESPIRATORY (INHALATION) EVERY 4 HOURS PRN
Status: DISCONTINUED | OUTPATIENT
Start: 2024-11-15 | End: 2024-11-19 | Stop reason: HOSPADM

## 2024-11-15 RX ORDER — ALBUTEROL SULFATE 90 UG/1
2 INHALANT RESPIRATORY (INHALATION)
Status: DISCONTINUED | OUTPATIENT
Start: 2024-11-15 | End: 2024-11-19 | Stop reason: HOSPADM

## 2024-11-15 RX ORDER — GUAIFENESIN/DEXTROMETHORPHAN 100-10MG/5
5 SYRUP ORAL EVERY 4 HOURS PRN
Status: DISCONTINUED | OUTPATIENT
Start: 2024-11-15 | End: 2024-11-19 | Stop reason: HOSPADM

## 2024-11-15 RX ORDER — AZITHROMYCIN 250 MG/1
500 TABLET, FILM COATED ORAL DAILY
Status: DISCONTINUED | OUTPATIENT
Start: 2024-11-15 | End: 2024-11-15

## 2024-11-15 RX ORDER — AZITHROMYCIN 250 MG/1
500 TABLET, FILM COATED ORAL DAILY
Status: COMPLETED | OUTPATIENT
Start: 2024-11-16 | End: 2024-11-17

## 2024-11-15 RX ORDER — BUDESONIDE AND FORMOTEROL FUMARATE DIHYDRATE 160; 4.5 UG/1; UG/1
2 AEROSOL RESPIRATORY (INHALATION)
Status: DISCONTINUED | OUTPATIENT
Start: 2024-11-15 | End: 2024-11-19 | Stop reason: HOSPADM

## 2024-11-15 RX ORDER — BENZONATATE 100 MG/1
200 CAPSULE ORAL 3 TIMES DAILY PRN
Status: DISCONTINUED | OUTPATIENT
Start: 2024-11-15 | End: 2024-11-19 | Stop reason: HOSPADM

## 2024-11-15 RX ADMIN — ALBUTEROL SULFATE 2 PUFF: 90 AEROSOL, METERED RESPIRATORY (INHALATION) at 10:16

## 2024-11-15 RX ADMIN — FERROUS SULFATE TAB 325 MG (65 MG ELEMENTAL FE) 325 MG: 325 (65 FE) TAB at 07:42

## 2024-11-15 RX ADMIN — GUAIFENESIN AND DEXTROMETHORPHAN 5 ML: 20; 200 SYRUP ORAL at 07:43

## 2024-11-15 RX ADMIN — ACETAMINOPHEN 650 MG: 325 TABLET ORAL at 07:43

## 2024-11-15 RX ADMIN — Medication 100 MG: at 07:42

## 2024-11-15 RX ADMIN — SODIUM CHLORIDE, PRESERVATIVE FREE 10 ML: 5 INJECTION INTRAVENOUS at 19:57

## 2024-11-15 RX ADMIN — GUAIFENESIN AND DEXTROMETHORPHAN 5 ML: 20; 200 SYRUP ORAL at 01:49

## 2024-11-15 RX ADMIN — PREDNISONE 40 MG: 20 TABLET ORAL at 07:42

## 2024-11-15 RX ADMIN — ACETAMINOPHEN 650 MG: 325 TABLET ORAL at 00:09

## 2024-11-15 RX ADMIN — ROPINIROLE HYDROCHLORIDE 0.5 MG: 0.5 TABLET, FILM COATED ORAL at 00:14

## 2024-11-15 RX ADMIN — FLUTICASONE PROPIONATE 1 SPRAY: 50 SPRAY, METERED NASAL at 09:08

## 2024-11-15 RX ADMIN — Medication 2000 UNITS: at 07:42

## 2024-11-15 RX ADMIN — ALBUTEROL SULFATE 2 PUFF: 90 AEROSOL, METERED RESPIRATORY (INHALATION) at 20:54

## 2024-11-15 RX ADMIN — HYDROXYZINE HYDROCHLORIDE 25 MG: 25 TABLET, FILM COATED ORAL at 00:14

## 2024-11-15 RX ADMIN — BUDESONIDE AND FORMOTEROL FUMARATE DIHYDRATE 2 PUFF: 160; 4.5 AEROSOL RESPIRATORY (INHALATION) at 20:54

## 2024-11-15 RX ADMIN — SODIUM CHLORIDE, PRESERVATIVE FREE 10 ML: 5 INJECTION INTRAVENOUS at 07:44

## 2024-11-15 RX ADMIN — AZITHROMYCIN DIHYDRATE 500 MG: 250 TABLET ORAL at 07:42

## 2024-11-15 RX ADMIN — Medication 1 TABLET: at 07:42

## 2024-11-15 ASSESSMENT — PAIN SCALES - GENERAL
PAINLEVEL_OUTOF10: 0
PAINLEVEL_OUTOF10: 0

## 2024-11-15 NOTE — PROGRESS NOTES
Spiritual Health History and Assessment/Progress Note  [facility]    (P) Initial Encounter, Spiritual/Emotional Needs,  ,  ,      Name: Ronn Ramires MRN: [id number]    Age: 68 y.o.     Sex: male   Language: English   Adventism: Non-Mormon   COPD exacerbation (HCC)     Date: 11/15/2024            Total Time Calculated: (P) 5 min              [facility]        Referral/Consult From: (P) Rounding   Encounter Overview/Reason: (P) Initial Encounter, Spiritual/Emotional Needs  Service Provided For: (P) Patient    Sheree, Belief, Meaning:   Patient identifies as spiritual, is connected with a sheree tradition or spiritual practice, and has beliefs or practices that help with coping during difficult times  Family/Friends No family/friends present      Importance and Influence:  Patient Other: Unable to assess.   Family/Friends No family/friends present    Community:  Patient Other: Unable to assess  Family/Friends No family/friends present    Assessment and Plan of Care:     Patient Interventions include: Facilitated expression of thoughts and feelings  Family/Friends Interventions include: No family/friends present    Patient Plan of Care: Spiritual Care available upon further referral  Family/Friends Plan of Care: No family/friends present    Electronically signed by Chaplain Marlen on 11/15/2024 at 9:14 AM

## 2024-11-15 NOTE — ED NOTES
Bedside shift report given to this RN by KAREN Singh. Pt resting on cot at this time. Pt denies further needs at this time. Pt breathing easy and unlabored. Call light in reach.

## 2024-11-15 NOTE — H&P
Running Out of Food in the Last Year: Never true     Ran Out of Food in the Last Year: Never true   Transportation Needs: No Transportation Needs (5/2/2024)    PRAPARE - Transportation     Lack of Transportation (Medical): No     Lack of Transportation (Non-Medical): No   Physical Activity: Sufficiently Active (9/19/2023)    Exercise Vital Sign     Days of Exercise per Week: 7 days     Minutes of Exercise per Session: 120 min   Housing Stability: High Risk (5/2/2024)    Housing Stability Vital Sign     Unable to Pay for Housing in the Last Year: No     Number of Places Lived in the Last Year: 2     Unstable Housing in the Last Year: Yes        Code status: Full Code     Thank you No primary care provider on file. for the opportunity to be involved in this patient's care.    Electronically signed by SHARON Greene on 11/14/2024 at 9:37 PM.
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
## 2024-11-15 NOTE — PROGRESS NOTES
Hospitalist Progress Note      Patient:  Ronn Ramires 68 y.o. male       : 1955  Unit/Bed:6K-10/010-A    Date of Admission: 2024      ASSESSMENT AND PLAN    Active Problems  COPD exacerbation  No formal COPD diagnosis but has smoking history, wheezing noted, findings of chronic lung disease and hyperinflated lungs on CXR.   Start Symbicort, Spiriva.   Cont prednisone, azithromycin.   Encourage IS.   Atypical chest pain   HsTNT negative. EKG NSR, no ischemic changes.   Likely related to COPD exacerbation.   Homelessness   CM and SW following. Planning [facility] at [place] who will have a bed on Monday.     Resolved Problems    Chronic Conditions (reviewed, stable, and home medications resumed, unless otherwise stated)  Chronic hepatitis C   Chronic ROGELIO  GERD  H/o alcohol abuse  Tobacco abuse       LDA: []CVC / []PICC / []Midline / []Raines / []Drains / []Mediport / [x]None  Antibiotics: yes  Steroids: yes  Labs (still needed?): [x]Yes / []No  IVF (still needed?): []Yes / [x]No    Level of care: []Step Down / [x]Med-Surg  Bed Status: []Inpatient / [x]Observation  Telemetry: [x]Yes / []No  PT/OT: []Yes / [x]No    DVT Prophylaxis: [x] Lovenox / [] Heparin / [] SCDs / [] Already on Systemic Anticoagulation / [] None     Expected discharge date:    Disposition: [facility].      Code status: Full Code     ===================================================================    Chief Complaint: SOB, chest pain   Subjective (past 24 hours):   Pt with increased work on breathing on exam. Reports SOB and lightheadedness. Complaining of \"pain everywhere.\" Does have some sharp chest pain, associated with taking a deep breath and cough. No fever/chills, abd pain, nvd.   Pt reports filling out paperwork for [facility] housing.       Medications:    Infusion Medications    sodium chloride      Scheduled Medications    azithromycin  500 mg Oral Daily    albuterol sulfate HFA  2 puff Inhalation BID RT    sodium

## 2024-11-15 NOTE — PLAN OF CARE
Problem: Respiratory - Adult  Goal: Clear lung sounds  Outcome: Progressing   Pt started on MDI to clear lung sounds/improve aeration and pt does MDI PRN at home. Patient mutually agreed on goals.

## 2024-11-15 NOTE — ED NOTES
Pt provided boxed lunch, ginger ale, and orange juice per pt request and ARELI Hernandez. Pt denies further needs at this time. Call light in reach.

## 2024-11-15 NOTE — ED NOTES
ED to inpatient nurses report      Chief Complaint:  Chief Complaint   Patient presents with    Shortness of Breath    Side Pain     Present to ED from: homeless    MOA:     LOC: alert and orientated to name, place, date  Mobility: Requires assistance * 1  Oxygen Baseline: RA    Current needs required: RA     Code Status:   Full Code    What abnormal results were found and what did you give/do to treat them? Chest pain (nitro drip)  Any procedures or intervention occur? Chest XR, CT head    Mental Status:  Level of Consciousness: Alert (0)    Psych Assessment:        Vitals:  Patient Vitals for the past 24 hrs:   BP Temp Pulse Resp SpO2 Height Weight   11/14/24 2154 (!) 129/92 -- 85 21 97 % -- --   11/14/24 2041 (!) 157/102 -- 77 19 96 % -- --   11/14/24 2039 (!) 157/102 -- 80 -- -- -- --   11/14/24 2000 (!) 156/109 -- 94 -- -- -- --   11/14/24 1958 (!) 156/109 -- 82 24 97 % -- --   11/14/24 1931 (!) 134/102 -- 83 19 97 % -- --   11/14/24 1916 (!) 145/102 -- 81 -- -- -- --   11/14/24 1846 (!) 160/108 -- 86 18 97 % -- --   11/14/24 1731 -- -- 81 20 99 % -- --   11/14/24 1602 (!) 106/52 -- -- -- -- -- --   11/14/24 1600 -- 98 °F (36.7 °C) 91 18 98 % 1.778 m (5' 10\") 64 kg (141 lb)        LDAs:   Peripheral IV 11/14/24 Right Forearm (Active)   Site Assessment Clean, dry & intact 11/14/24 1704   Line Status Blood return noted;Flushed;Normal saline locked 11/14/24 1704   Line Care Connections checked and tightened 11/14/24 1704   Phlebitis Assessment No symptoms 11/14/24 1704   Infiltration Assessment 0 11/14/24 1704   Dressing Intervention New 11/14/24 1704       Ambulatory Status:  No data recorded    Diagnosis:  DISPOSITION Admitted 11/14/2024 09:37:17 PM   Final diagnoses:   Chest pain, unspecified type   COPD exacerbation (HCC)        Consults:  None     Pain Score:  Pain Assessment  Pain Assessment: 0-10  Pain Level: 10  Pain Location: Chest  Pain Descriptors: Pressure    C-SSRS:   Risk of Suicide: No

## 2024-11-15 NOTE — ED NOTES
Pt resting on cot watching tv at this time. Pt denies any chest pain at this time. Pt educated on importance of letting somebody know if chest pain returns. Pt provided urinal. Vitals collected. Pt breathing easy and unlabored. Call light in reach.

## 2024-11-15 NOTE — ED NOTES
Pt resting on cot w/ eyes closed. Pt updated on POC. Pt denies chest pain at this time. Pt denies further needs at this time. Vitals collected. Pt breathing easy and unlabored. Call light in reach.

## 2024-11-15 NOTE — ED NOTES
Called [facility] and spoke with Rosina who approved patient transport to [facility]. Patient is in stable condition.

## 2024-11-15 NOTE — CARE COORDINATION
Case Management Assessment Initial Evaluation    Date/Time of Evaluation: 11/15/2024 8:04 AM  Assessment Completed by: Marquis Norman RN    If patient is discharged prior to next notation, then this note serves as note for discharge by case management.    Patient Name: Ronn Ramires                   YOB: 1955  Diagnosis: COPD exacerbation (HCC) [J44.1]  Chest pain, unspecified type [R07.9]                   Date / Time: 11/14/2024  3:49 PM  Location: [address] [place] [address]     Patient Admission Status: Inpatient   Readmission Risk Low 0-14, Mod 15-19), High > 20: Readmission Risk Score: 20.1    Current PCP: No primary care provider on file.  Health Care Decision Makers:   Primary Decision Maker: None,None - Other Relative - [phone]    Additional Case Management Notes: 95% RA. Hypertensive, last 127/90. PO zithromax, prednisone.     Procedures: none    Imaging: neg    Patient Goals/Plan/Treatment Preferences: Met with Ronn, he is homeless and currently living on the street. He is aware of local shelters, but states they have been full d/t colder temps. He is interested in placement at [facility] but has not wanted to walk there if a bed isn't available. Placed call to [facility] Admissions, they are emailing screening and housing forms. Admissions coordinator states if he qualifies, they would not be able to place him in a bed until Monday.        11/15/24 1032   Service Assessment   Patient Orientation Alert and Oriented   Cognition Alert   History Provided By Patient   Primary Caregiver Self   Support Systems Family Members   Patient's Healthcare Decision Maker is: Legal Next of Kin   PCP Verified by CM Yes  (Edgar does not have a PCP. He would like to follow with residency clinic.)   Prior Functional Level Independent in ADLs/IADLs   Current Functional Level Independent in ADLs/IADLs   Can patient return to prior living arrangement Yes   Ability to make needs known: Good   Family able to assist

## 2024-11-15 NOTE — ED NOTES
Pt resting on cot watching tv at this time. Pt updated on POC. Pt denies further needs at this time. Vitals collected. Call light in reach.

## 2024-11-15 NOTE — ED PROVIDER NOTES
[facility]  EMERGENCY DEPARTMENT ENCOUNTER   PHYSICIAN ATTESTATION    Pt Name: Ronn Ramires  MRN: [id number]  Birthdate 1955  Date of evaluation: 11/14/24        Physician Note:    I have personally performed and/or participated in the history, exam and medical decision making and agree with all pertinent clinical information.  I have also reviewed and agree with the past medical, family and social history unless otherwise noted.    I have personally performed a face to face diagnostic evaluation on this patient. I have reviewed the mid-level’s findings and agree.      History:     Patient was seen with Mary Lynne NP.    This is a 68-year-old male who initially presented with chest pain.  He has some nonspecific changes of the EKG which are a bit concerning.  No definite STEMI criteria.  But does have chest pain that does seem anginal in nature, worse with exertion.  Was treated essentially as unstable angina.  We gave him aspirin and we started him on a nitroglycerin drip.    While here he seemed to be developing some rhythmic motions of his head that looked something like a dystonic reaction.  He does not appear to be any new psych medications.  We did end up getting a CT of the head which came back negative.  Vitals have remained stable and we have arranged for inpatient admission for further evaluation    Diagnosis: Chest pain, involuntary head tremor of uncertain etiology  Admitted                DO Dee Ambrose Lawrence, DO  11/14/24 9015    
[facility]  EMERGENCY DEPARTMENT ENCOUNTER     Pt Name: Ronn Ramires  MRN: [id number]  Birthdate 1955  Date of evaluation: 11/14/24        Mid-level provider Note:    I have personally performed and/or participated in the history, exam and medical decision making and agree with all pertinent clinical information as noted by the previous provider.  I have also reviewed and agree with the past medical, family and social history unless otherwise noted.    I have personally performed a face to face diagnostic evaluation on this patient. I have reviewed the previous provider's findings and agree.      Evaluation:  I assumed care of this patient from SHARON guerrero pending results and re=evaluation.  The patient is ill appearing and continues to have abd pain.  The patient developed chest pain.  He required continued evaluation and intervention.  Patient is admitted to the hospitalist service for further evaluation.             XR CHEST (2 VW)    Result Date: 11/14/2024  PROCEDURE: XR CHEST (2 VW) CLINICAL INFORMATION: Shortness of breath TECHNIQUE: PA and lateral chest radiographs. COMPARISON: [facility] AP chest radiographs 10/17/2024 FINDINGS: Cardiac silhouette is within normal limits. Atherosclerotic calcifications are present in the thoracic aorta. Lungs are hyperinflated with flattening of the diaphragms. There are no lung consolidations. Degenerative changes are present in the thoracic spine.     1. No acute intrathoracic process. 2. Chronic lung disease. **This report has been created using voice recognition software. It may contain minor errors which are inherent in voice recognition technology.** Electronically signed by Dr. Ronn King    XR CHEST PORTABLE    Result Date: 10/17/2024  PROCEDURE: XR CHEST PORTABLE CLINICAL INFORMATION: SOB. COMPARISON: Chest x-ray dated 12 July 2024.. TECHNIQUE: AP upright view of the chest. FINDINGS: The heart size is normal.The mediastinum is not 
However, be advised this is a medical document. It is intended as peer to peer communication. It is written in medical language and may contain abbreviations or verbiage that are unfamiliar. It may appear blunt or direct. Medical documents are intended to carry relevant information, facts as evident, and the clinical opinion of the practitioner.        Ruben Van PA-C  11/15/24 0818       Ruben Van PA-C  11/15/24 0821

## 2024-11-15 NOTE — ED NOTES
Pt resting on cot watching tv at this time. Repeat EKG completed per order. Pt medicated per MAR. Pt updated on POC. Pt denies further needs at this time. Vitals collected. Pt breathing easy and unlabored. Call light in reach.

## 2024-11-16 LAB
ANION GAP SERPL CALC-SCNC: 10 MEQ/L (ref 8–16)
BUN SERPL-MCNC: 17 MG/DL (ref 7–22)
CALCIUM SERPL-MCNC: 8.8 MG/DL (ref 8.5–10.5)
CHLORIDE SERPL-SCNC: 96 MEQ/L (ref 98–111)
CO2 SERPL-SCNC: 23 MEQ/L (ref 23–33)
CREAT SERPL-MCNC: 0.8 MG/DL (ref 0.4–1.2)
DEPRECATED RDW RBC AUTO: 47.8 FL (ref 35–45)
EKG ATRIAL RATE: 77 BPM
EKG ATRIAL RATE: 79 BPM
EKG ATRIAL RATE: 90 BPM
EKG P AXIS: 72 DEGREES
EKG P AXIS: 75 DEGREES
EKG P AXIS: 76 DEGREES
EKG P-R INTERVAL: 148 MS
EKG P-R INTERVAL: 160 MS
EKG P-R INTERVAL: 160 MS
EKG Q-T INTERVAL: 374 MS
EKG Q-T INTERVAL: 398 MS
EKG Q-T INTERVAL: 412 MS
EKG QRS DURATION: 84 MS
EKG QRS DURATION: 84 MS
EKG QRS DURATION: 86 MS
EKG QTC CALCULATION (BAZETT): 450 MS
EKG QTC CALCULATION (BAZETT): 457 MS
EKG QTC CALCULATION (BAZETT): 472 MS
EKG R AXIS: 65 DEGREES
EKG R AXIS: 67 DEGREES
EKG R AXIS: 69 DEGREES
EKG T AXIS: 41 DEGREES
EKG T AXIS: 57 DEGREES
EKG T AXIS: 63 DEGREES
EKG VENTRICULAR RATE: 77 BPM
EKG VENTRICULAR RATE: 79 BPM
EKG VENTRICULAR RATE: 90 BPM
ERYTHROCYTE [DISTWIDTH] IN BLOOD BY AUTOMATED COUNT: 12.5 % (ref 11.5–14.5)
GFR SERPL CREATININE-BSD FRML MDRD: > 90 ML/MIN/1.73M2
GLUCOSE SERPL-MCNC: 112 MG/DL (ref 70–108)
HCT VFR BLD AUTO: 37.2 % (ref 42–52)
HGB BLD-MCNC: 12.7 GM/DL (ref 14–18)
MCH RBC QN AUTO: 35 PG (ref 26–33)
MCHC RBC AUTO-ENTMCNC: 34.1 GM/DL (ref 32.2–35.5)
MCV RBC AUTO: 102.5 FL (ref 80–94)
PLATELET # BLD AUTO: 170 THOU/MM3 (ref 130–400)
PMV BLD AUTO: 10.5 FL (ref 9.4–12.4)
POTASSIUM SERPL-SCNC: 3.7 MEQ/L (ref 3.5–5.2)
RBC # BLD AUTO: 3.63 MILL/MM3 (ref 4.7–6.1)
SODIUM SERPL-SCNC: 129 MEQ/L (ref 135–145)
WBC # BLD AUTO: 7.1 THOU/MM3 (ref 4.8–10.8)

## 2024-11-16 PROCEDURE — 2580000003 HC RX 258: Performed by: PHYSICIAN ASSISTANT

## 2024-11-16 PROCEDURE — 6370000000 HC RX 637 (ALT 250 FOR IP): Performed by: PHYSICIAN ASSISTANT

## 2024-11-16 PROCEDURE — 94761 N-INVAS EAR/PLS OXIMETRY MLT: CPT

## 2024-11-16 PROCEDURE — 99232 SBSQ HOSP IP/OBS MODERATE 35: CPT | Performed by: PHYSICIAN ASSISTANT

## 2024-11-16 PROCEDURE — 80048 BASIC METABOLIC PNL TOTAL CA: CPT

## 2024-11-16 PROCEDURE — 6360000002 HC RX W HCPCS: Performed by: PHYSICIAN ASSISTANT

## 2024-11-16 PROCEDURE — 94640 AIRWAY INHALATION TREATMENT: CPT

## 2024-11-16 PROCEDURE — 93010 ELECTROCARDIOGRAM REPORT: CPT | Performed by: INTERNAL MEDICINE

## 2024-11-16 PROCEDURE — 85027 COMPLETE CBC AUTOMATED: CPT

## 2024-11-16 PROCEDURE — 96375 TX/PRO/DX INJ NEW DRUG ADDON: CPT

## 2024-11-16 PROCEDURE — G0378 HOSPITAL OBSERVATION PER HR: HCPCS

## 2024-11-16 PROCEDURE — 36415 COLL VENOUS BLD VENIPUNCTURE: CPT

## 2024-11-16 RX ORDER — HYDRALAZINE HYDROCHLORIDE 20 MG/ML
10 INJECTION INTRAMUSCULAR; INTRAVENOUS ONCE
Status: COMPLETED | OUTPATIENT
Start: 2024-11-16 | End: 2024-11-16

## 2024-11-16 RX ADMIN — ACETAMINOPHEN 650 MG: 325 TABLET ORAL at 20:08

## 2024-11-16 RX ADMIN — TIOTROPIUM BROMIDE INHALATION SPRAY 2 PUFF: 3.12 SPRAY, METERED RESPIRATORY (INHALATION) at 09:37

## 2024-11-16 RX ADMIN — PREDNISONE 40 MG: 20 TABLET ORAL at 07:51

## 2024-11-16 RX ADMIN — FERROUS SULFATE TAB 325 MG (65 MG ELEMENTAL FE) 325 MG: 325 (65 FE) TAB at 07:51

## 2024-11-16 RX ADMIN — Medication 2000 UNITS: at 07:50

## 2024-11-16 RX ADMIN — BUDESONIDE AND FORMOTEROL FUMARATE DIHYDRATE 2 PUFF: 160; 4.5 AEROSOL RESPIRATORY (INHALATION) at 09:37

## 2024-11-16 RX ADMIN — SODIUM CHLORIDE, PRESERVATIVE FREE 10 ML: 5 INJECTION INTRAVENOUS at 07:50

## 2024-11-16 RX ADMIN — AZITHROMYCIN DIHYDRATE 500 MG: 250 TABLET ORAL at 07:51

## 2024-11-16 RX ADMIN — BUDESONIDE AND FORMOTEROL FUMARATE DIHYDRATE 2 PUFF: 160; 4.5 AEROSOL RESPIRATORY (INHALATION) at 17:48

## 2024-11-16 RX ADMIN — Medication 100 MG: at 07:51

## 2024-11-16 RX ADMIN — ALBUTEROL SULFATE 2 PUFF: 90 AEROSOL, METERED RESPIRATORY (INHALATION) at 09:37

## 2024-11-16 RX ADMIN — HYDRALAZINE HYDROCHLORIDE 10 MG: 20 INJECTION, SOLUTION INTRAMUSCULAR; INTRAVENOUS at 03:44

## 2024-11-16 RX ADMIN — SODIUM CHLORIDE, PRESERVATIVE FREE 10 ML: 5 INJECTION INTRAVENOUS at 20:08

## 2024-11-16 RX ADMIN — Medication 1 TABLET: at 07:51

## 2024-11-16 RX ADMIN — ALBUTEROL SULFATE 2 PUFF: 90 AEROSOL, METERED RESPIRATORY (INHALATION) at 17:49

## 2024-11-16 ASSESSMENT — PAIN SCALES - GENERAL
PAINLEVEL_OUTOF10: 9
PAINLEVEL_OUTOF10: 10

## 2024-11-16 ASSESSMENT — PAIN DESCRIPTION - DESCRIPTORS
DESCRIPTORS: ACHING
DESCRIPTORS: ACHING

## 2024-11-16 ASSESSMENT — PAIN DESCRIPTION - LOCATION
LOCATION: HEAD
LOCATION: HEAD

## 2024-11-16 ASSESSMENT — PAIN DESCRIPTION - ORIENTATION
ORIENTATION: MID
ORIENTATION: RIGHT;LEFT

## 2024-11-16 NOTE — PLAN OF CARE
Problem: Respiratory - Adult  Goal: Clear lung sounds  Outcome: Progressing   Continue therapy as ordered to achieve and maintain clear breath sounds and improve aeration.

## 2024-11-16 NOTE — PROGRESS NOTES
Spiritual Health History and Assessment/Progress Note  [facility]    (P) Spiritual/Emotional Needs,  ,  ,      Name: Ronn Ramires MRN: [id number]    Age: 68 y.o.     Sex: male   Language: English   Mandaeism: Non-Islam   COPD exacerbation (HCC)     Date: 11/16/2024            Total Time Calculated: (P) 15 min              Spiritual Assessment began in [facility]        Referral/Consult From: (P) Nurse   Encounter Overview/Reason: (P) Spiritual/Emotional Needs  Service Provided For: (P) Patient    Sheree, Belief, Meaning:   Patient unable to assess at this time  Family/Friends No family/friends present      Importance and Influence:  Patient unable to assess at this time  Family/Friends No family/friends present    Community:  Patient Other: No family and no support system, no home of his own.  Family/Friends No family/friends present    Assessment and Plan of Care:     Patient Interventions include: Facilitated expression of thoughts and feelings  Family/Friends Interventions include: Facilitated expression of thoughts and feelings    Patient Plan of Care: No spiritual needs identified for follow-up  Family/Friends Plan of Care: No spiritual needs identified for follow-up    Electronically signed by Chaplain Ninfa on 11/16/2024 at 5:11 PM

## 2024-11-16 NOTE — PLAN OF CARE
Problem: Chronic Conditions and Co-morbidities  Goal: Patient's chronic conditions and co-morbidity symptoms are monitored and maintained or improved  Outcome: Progressing  Flowsheets (Taken 11/16/2024 0139)  Care Plan - Patient's Chronic Conditions and Co-Morbidity Symptoms are Monitored and Maintained or Improved:   Monitor and assess patient's chronic conditions and comorbid symptoms for stability, deterioration, or improvement   Collaborate with multidisciplinary team to address chronic and comorbid conditions and prevent exacerbation or deterioration   Update acute care plan with appropriate goals if chronic or comorbid symptoms are exacerbated and prevent overall improvement and discharge     Problem: Discharge Planning  Goal: Discharge to home or other facility with appropriate resources  Outcome: Progressing  Flowsheets (Taken 11/16/2024 0139)  Discharge to home or other facility with appropriate resources:   Identify discharge learning needs (meds, wound care, etc)   Arrange for needed discharge resources and transportation as appropriate   Identify barriers to discharge with patient and caregiver   Arrange for interpreters to assist at discharge as needed   Refer to discharge planning if patient needs post-hospital services based on physician order or complex needs related to functional status, cognitive ability or social support system  Note: Pt is homeless, to be discharged to homeless shelter.     Problem: Pain  Goal: Verbalizes/displays adequate comfort level or baseline comfort level  Outcome: Progressing  Flowsheets (Taken 11/16/2024 0139)  Verbalizes/displays adequate comfort level or baseline comfort level:   Encourage patient to monitor pain and request assistance   Assess pain using appropriate pain scale   Administer analgesics based on type and severity of pain and evaluate response   Implement non-pharmacological measures as appropriate and evaluate response   Consider cultural and social

## 2024-11-16 NOTE — PROGRESS NOTES
Hospitalist Progress Note      Patient:  Ronn Ramires 68 y.o. male       : 1955  Unit/Bed:6K-10/010-A    Date of Admission: 2024      ASSESSMENT AND PLAN    Active Problems  COPD exacerbation  No formal COPD diagnosis but has smoking history, wheezing noted, findings of chronic lung disease and hyperinflated lungs on CXR.   Symbicort, Spiriva started 11/15 - pt feels these are helping.   Cont prednisone, azithromycin.   Encourage IS.   Atypical chest pain   HsTNT negative. EKG NSR, no ischemic changes.   Likely related to COPD exacerbation.   Homelessness   CM and SW following. Planning [facility] at [place] who will have a bed on Monday.   Mild hyponatremia, chronic  Na 129, slightly lower than his baseline. Will add electrolyte drink supplements BID. Encourage PO hydration. BMP in AM.     Resolved Problems    Chronic Conditions (reviewed, stable, and home medications resumed, unless otherwise stated)  Chronic hepatitis C   Chronic ROGELIO  GERD  H/o alcohol abuse  Tobacco abuse       LDA: []CVC / []PICC / []Midline / []Raines / []Drains / []Mediport / [x]None  Antibiotics: yes  Steroids: yes  Labs (still needed?): [x]Yes / []No  IVF (still needed?): []Yes / [x]No    Level of care: []Step Down / [x]Med-Surg  Bed Status: []Inpatient / [x]Observation  Telemetry: [x]Yes / []No  PT/OT: []Yes / [x]No    DVT Prophylaxis: [x] Lovenox / [] Heparin / [] SCDs / [] Already on Systemic Anticoagulation / [] None     Expected discharge date:    Disposition: [facility].      Code status: Full Code     ===================================================================    Chief Complaint: SOB, chest pain   Subjective (past 24 hours):   Pt reports improvement in his SOB since starting the new inhalers. He denies chest pain at this time. No abd pain, nvd, fever/chills.      Medications:    Infusion Medications    sodium chloride      Scheduled Medications    albuterol sulfate HFA  2 puff Inhalation BID RT

## 2024-11-17 LAB
ANION GAP SERPL CALC-SCNC: 10 MEQ/L (ref 8–16)
BUN SERPL-MCNC: 16 MG/DL (ref 7–22)
CALCIUM SERPL-MCNC: 9 MG/DL (ref 8.5–10.5)
CHLORIDE SERPL-SCNC: 97 MEQ/L (ref 98–111)
CO2 SERPL-SCNC: 25 MEQ/L (ref 23–33)
CREAT SERPL-MCNC: 0.9 MG/DL (ref 0.4–1.2)
GFR SERPL CREATININE-BSD FRML MDRD: > 90 ML/MIN/1.73M2
GLUCOSE SERPL-MCNC: 84 MG/DL (ref 70–108)
POTASSIUM SERPL-SCNC: 3.8 MEQ/L (ref 3.5–5.2)
SODIUM SERPL-SCNC: 132 MEQ/L (ref 135–145)

## 2024-11-17 PROCEDURE — 99232 SBSQ HOSP IP/OBS MODERATE 35: CPT | Performed by: PHYSICIAN ASSISTANT

## 2024-11-17 PROCEDURE — 6370000000 HC RX 637 (ALT 250 FOR IP): Performed by: PHYSICIAN ASSISTANT

## 2024-11-17 PROCEDURE — 2580000003 HC RX 258: Performed by: PHYSICIAN ASSISTANT

## 2024-11-17 PROCEDURE — 36415 COLL VENOUS BLD VENIPUNCTURE: CPT

## 2024-11-17 PROCEDURE — 94761 N-INVAS EAR/PLS OXIMETRY MLT: CPT

## 2024-11-17 PROCEDURE — G0378 HOSPITAL OBSERVATION PER HR: HCPCS

## 2024-11-17 PROCEDURE — 94640 AIRWAY INHALATION TREATMENT: CPT

## 2024-11-17 PROCEDURE — 80048 BASIC METABOLIC PNL TOTAL CA: CPT

## 2024-11-17 RX ADMIN — SODIUM CHLORIDE, PRESERVATIVE FREE 10 ML: 5 INJECTION INTRAVENOUS at 21:26

## 2024-11-17 RX ADMIN — TIOTROPIUM BROMIDE INHALATION SPRAY 2 PUFF: 3.12 SPRAY, METERED RESPIRATORY (INHALATION) at 08:56

## 2024-11-17 RX ADMIN — FERROUS SULFATE TAB 325 MG (65 MG ELEMENTAL FE) 325 MG: 325 (65 FE) TAB at 07:46

## 2024-11-17 RX ADMIN — BUDESONIDE AND FORMOTEROL FUMARATE DIHYDRATE 2 PUFF: 160; 4.5 AEROSOL RESPIRATORY (INHALATION) at 19:58

## 2024-11-17 RX ADMIN — ACETAMINOPHEN 650 MG: 325 TABLET ORAL at 19:32

## 2024-11-17 RX ADMIN — AZITHROMYCIN DIHYDRATE 500 MG: 250 TABLET ORAL at 07:46

## 2024-11-17 RX ADMIN — PREDNISONE 40 MG: 20 TABLET ORAL at 07:46

## 2024-11-17 RX ADMIN — BUDESONIDE AND FORMOTEROL FUMARATE DIHYDRATE 2 PUFF: 160; 4.5 AEROSOL RESPIRATORY (INHALATION) at 08:56

## 2024-11-17 RX ADMIN — Medication 2000 UNITS: at 07:46

## 2024-11-17 RX ADMIN — ALBUTEROL SULFATE 2 PUFF: 90 AEROSOL, METERED RESPIRATORY (INHALATION) at 08:57

## 2024-11-17 RX ADMIN — ALBUTEROL SULFATE 2 PUFF: 90 AEROSOL, METERED RESPIRATORY (INHALATION) at 19:55

## 2024-11-17 RX ADMIN — Medication 1 TABLET: at 07:46

## 2024-11-17 RX ADMIN — SODIUM CHLORIDE, PRESERVATIVE FREE 10 ML: 5 INJECTION INTRAVENOUS at 07:46

## 2024-11-17 RX ADMIN — Medication 100 MG: at 07:46

## 2024-11-17 ASSESSMENT — PAIN DESCRIPTION - LOCATION: LOCATION: HEAD

## 2024-11-17 ASSESSMENT — PAIN DESCRIPTION - ORIENTATION: ORIENTATION: MID;POSTERIOR

## 2024-11-17 ASSESSMENT — PAIN SCALES - GENERAL: PAINLEVEL_OUTOF10: 9

## 2024-11-17 ASSESSMENT — PAIN DESCRIPTION - DESCRIPTORS: DESCRIPTORS: ACHING

## 2024-11-17 NOTE — PROGRESS NOTES
Hospitalist Progress Note      Patient:  Ronn Ramires 68 y.o. male       : 1955  Unit/Bed:6K-10/010-A    Date of Admission: 2024      ASSESSMENT AND PLAN    Active Problems  COPD exacerbation  No formal COPD diagnosis but has smoking history, wheezing noted, findings of chronic lung disease and hyperinflated lungs on CXR.   Symbicort, Spiriva started 11/15 - pt feels these are helping.   Cont prednisone, azithromycin.   Encourage IS.   Atypical chest pain   HsTNT negative. EKG NSR, no ischemic changes.   Likely related to COPD exacerbation.   Homelessness   CM and SW following. Planning [facility] at [place] who will have a bed on Monday.   Mild hyponatremia, chronic  Na slightly lower than his baseline. Added electrolyte drink supplements BID. Encourage PO hydration. BMP in AM. - Improving.     Resolved Problems    Chronic Conditions (reviewed, stable, and home medications resumed, unless otherwise stated)  Chronic hepatitis C   Chronic ROGELIO  GERD  H/o alcohol abuse  Tobacco abuse       LDA: []CVC / []PICC / []Midline / []Raines / []Drains / []Mediport / [x]None  Antibiotics: yes  Steroids: yes  Labs (still needed?): [x]Yes / []No  IVF (still needed?): []Yes / [x]No    Level of care: []Step Down / [x]Med-Surg  Bed Status: []Inpatient / [x]Observation  Telemetry: [x]Yes / []No  PT/OT: []Yes / [x]No    DVT Prophylaxis: [x] Lovenox / [] Heparin / [] SCDs / [] Already on Systemic Anticoagulation / [] None     Expected discharge date:    Disposition: [facility].      Code status: Full Code     ===================================================================    Chief Complaint: SOB, chest pain   Subjective (past 24 hours):   Pt reports 3-4 soft BMs daily, explained this normal. He denies abd pain, NVD. Reports continued SOB with some improvement from arrival. No chest pain.       Medications:    Infusion Medications    sodium chloride      Scheduled Medications    albuterol sulfate HFA  2 puff

## 2024-11-17 NOTE — PLAN OF CARE
Problem: Respiratory - Adult  Goal: Clear lung sounds  Outcome: Progressing   Patient lung sounds are considered normal for their current lung condition. No signs of distress noted. Current treatment regimen appropriate

## 2024-11-17 NOTE — PLAN OF CARE
Problem: Chronic Conditions and Co-morbidities  Goal: Patient's chronic conditions and co-morbidity symptoms are monitored and maintained or improved  11/16/2024 2206 by Elmo Velásquez RN  Outcome: Progressing  Flowsheets (Taken 11/16/2024 2206)  Care Plan - Patient's Chronic Conditions and Co-Morbidity Symptoms are Monitored and Maintained or Improved:   Monitor and assess patient's chronic conditions and comorbid symptoms for stability, deterioration, or improvement   Collaborate with multidisciplinary team to address chronic and comorbid conditions and prevent exacerbation or deterioration   Update acute care plan with appropriate goals if chronic or comorbid symptoms are exacerbated and prevent overall improvement and discharge     Problem: Discharge Planning  Goal: Discharge to home or other facility with appropriate resources  11/16/2024 2206 by Elmo Velásquez RN  Outcome: Progressing  Flowsheets (Taken 11/16/2024 2206)  Discharge to home or other facility with appropriate resources:   Arrange for needed discharge resources and transportation as appropriate   Identify barriers to discharge with patient and caregiver   Identify discharge learning needs (meds, wound care, etc)   Arrange for interpreters to assist at discharge as needed   Refer to discharge planning if patient needs post-hospital services based on physician order or complex needs related to functional status, cognitive ability or social support system     Problem: Pain  Goal: Verbalizes/displays adequate comfort level or baseline comfort level  11/16/2024 2206 by Elmo Velásquez RN  Outcome: Progressing  Flowsheets (Taken 11/16/2024 2206)  Verbalizes/displays adequate comfort level or baseline comfort level:   Implement non-pharmacological measures as appropriate and evaluate response   Assess pain using appropriate pain scale   Administer analgesics based on type and severity of pain and evaluate response   Encourage patient to monitor pain

## 2024-11-18 LAB
ANION GAP SERPL CALC-SCNC: 8 MEQ/L (ref 8–16)
BUN SERPL-MCNC: 16 MG/DL (ref 7–22)
CALCIUM SERPL-MCNC: 8.6 MG/DL (ref 8.5–10.5)
CHLORIDE SERPL-SCNC: 98 MEQ/L (ref 98–111)
CO2 SERPL-SCNC: 26 MEQ/L (ref 23–33)
CREAT SERPL-MCNC: 0.9 MG/DL (ref 0.4–1.2)
GFR SERPL CREATININE-BSD FRML MDRD: > 90 ML/MIN/1.73M2
GLUCOSE SERPL-MCNC: 79 MG/DL (ref 70–108)
POTASSIUM SERPL-SCNC: 3.9 MEQ/L (ref 3.5–5.2)
SODIUM SERPL-SCNC: 132 MEQ/L (ref 135–145)

## 2024-11-18 PROCEDURE — 94761 N-INVAS EAR/PLS OXIMETRY MLT: CPT

## 2024-11-18 PROCEDURE — 6370000000 HC RX 637 (ALT 250 FOR IP): Performed by: PHYSICIAN ASSISTANT

## 2024-11-18 PROCEDURE — 80048 BASIC METABOLIC PNL TOTAL CA: CPT

## 2024-11-18 PROCEDURE — 36415 COLL VENOUS BLD VENIPUNCTURE: CPT

## 2024-11-18 PROCEDURE — G0378 HOSPITAL OBSERVATION PER HR: HCPCS

## 2024-11-18 PROCEDURE — 94640 AIRWAY INHALATION TREATMENT: CPT

## 2024-11-18 PROCEDURE — 99239 HOSP IP/OBS DSCHRG MGMT >30: CPT | Performed by: PHYSICIAN ASSISTANT

## 2024-11-18 PROCEDURE — 2580000003 HC RX 258: Performed by: PHYSICIAN ASSISTANT

## 2024-11-18 RX ORDER — BUDESONIDE AND FORMOTEROL FUMARATE DIHYDRATE 160; 4.5 UG/1; UG/1
2 AEROSOL RESPIRATORY (INHALATION)
Qty: 10.2 G | Refills: 0 | Status: SHIPPED | OUTPATIENT
Start: 2024-11-18 | End: 2024-11-18 | Stop reason: HOSPADM

## 2024-11-18 RX ORDER — FLUTICASONE FUROATE AND VILANTEROL 100; 25 UG/1; UG/1
1 POWDER RESPIRATORY (INHALATION) DAILY
Qty: 1 EACH | Refills: 0 | Status: SHIPPED | OUTPATIENT
Start: 2024-11-18

## 2024-11-18 RX ORDER — ALBUTEROL SULFATE 90 UG/1
2 INHALANT RESPIRATORY (INHALATION) EVERY 4 HOURS PRN
Qty: 18 G | Refills: 0 | Status: SHIPPED | OUTPATIENT
Start: 2024-11-18

## 2024-11-18 RX ADMIN — Medication 100 MG: at 07:57

## 2024-11-18 RX ADMIN — ALBUTEROL SULFATE 2 PUFF: 90 AEROSOL, METERED RESPIRATORY (INHALATION) at 21:18

## 2024-11-18 RX ADMIN — Medication 1 TABLET: at 07:57

## 2024-11-18 RX ADMIN — BUDESONIDE AND FORMOTEROL FUMARATE DIHYDRATE 2 PUFF: 160; 4.5 AEROSOL RESPIRATORY (INHALATION) at 21:18

## 2024-11-18 RX ADMIN — SODIUM CHLORIDE, PRESERVATIVE FREE 10 ML: 5 INJECTION INTRAVENOUS at 07:57

## 2024-11-18 RX ADMIN — BUDESONIDE AND FORMOTEROL FUMARATE DIHYDRATE 2 PUFF: 160; 4.5 AEROSOL RESPIRATORY (INHALATION) at 07:34

## 2024-11-18 RX ADMIN — SODIUM CHLORIDE, PRESERVATIVE FREE 10 ML: 5 INJECTION INTRAVENOUS at 20:42

## 2024-11-18 RX ADMIN — FERROUS SULFATE TAB 325 MG (65 MG ELEMENTAL FE) 325 MG: 325 (65 FE) TAB at 07:57

## 2024-11-18 RX ADMIN — ALBUTEROL SULFATE 2 PUFF: 90 AEROSOL, METERED RESPIRATORY (INHALATION) at 07:34

## 2024-11-18 RX ADMIN — Medication 2000 UNITS: at 07:57

## 2024-11-18 RX ADMIN — ACETAMINOPHEN 650 MG: 325 TABLET ORAL at 07:57

## 2024-11-18 RX ADMIN — PREDNISONE 40 MG: 20 TABLET ORAL at 07:57

## 2024-11-18 RX ADMIN — TIOTROPIUM BROMIDE INHALATION SPRAY 2 PUFF: 3.12 SPRAY, METERED RESPIRATORY (INHALATION) at 07:34

## 2024-11-18 ASSESSMENT — PAIN DESCRIPTION - ORIENTATION: ORIENTATION: MID

## 2024-11-18 ASSESSMENT — PAIN DESCRIPTION - DESCRIPTORS: DESCRIPTORS: TIGHTNESS

## 2024-11-18 ASSESSMENT — PAIN DESCRIPTION - LOCATION: LOCATION: CHEST

## 2024-11-18 ASSESSMENT — PAIN SCALES - GENERAL: PAINLEVEL_OUTOF10: 9

## 2024-11-18 NOTE — PLAN OF CARE
Problem: Respiratory - Adult  Goal: Clear lung sounds  Outcome: Progressing  Note: Patients breath sounds were clear and diminished throughout all lung fields. Continue taking inhalers as ordered to improve aeration.

## 2024-11-18 NOTE — DISCHARGE SUMMARY
Hospitalist Discharge Summary    Patient: Ronn Ramires  YOB: 1955  MRN: [id number]   Acct: [id number]    Primary Care Physician: Vasu Ford DO    Admit date  11/14/2024    Discharge date:  11/19/2024    Chief Complaint on presentation: SOB, chest pain     Discharge Diagnoses with Assessment & Plan / Hospital course:    Patent admitted to [facility] service for COPD exacerbation. Treated with steroids, azithromycin. Started on scheduled inhalers. Patient has remained stable on RA. Patient homeless, will be discharged to [facility].      COPD exacerbation  No formal COPD diagnosis but has smoking history, wheezing noted, findings of chronic lung disease and hyperinflated lungs on CXR.   Symbicort, Spiriva started 11/15 - pt feels these are helping.   D/t insurance coverage, continue Breo and Atrovent at discharge, sent to our pharmacy.   Received steroids x 5 days, azithromycin x 3 days.   Continue to encourage IS.   Pulmonary referral placed.   Atypical chest pain   HsTNT negative. EKG NSR, no ischemic changes.   Likely related to COPD exacerbation.   Homelessness   CM and SW following. Planning [facility] at [place] who will have a bed on Tuesday at 9am.   Mild hyponatremia, chronic  Added electrolyte drink supplements BID, Na stable at 132. Follow up with PCP.      Chronic Conditions (reviewed, stable, and home medications resumed, unless otherwise stated)  Chronic hepatitis C   Chronic ROGELIO  GERD  H/o alcohol abuse  Tobacco abuse     Subjective (day of discharge): Pt reports continued shortness of breathi but remains stable on RA. Explained continued tobacco cessation and compliance with inhalers but that with having COPD, will have some chronic SOB. Patient needs to establish care with PCP.   Patient responded well to medical management. Consultants had signed off or were contacted and agree with discharge plan. The patient was discharged in stable condition with appropriate

## 2024-11-18 NOTE — CARE COORDINATION
11/18/24, 11:38 AM EST    DISCHARGE PLANNING EVALUATION       Patient used  phone to contact [facility] and they do not have any open spots today but arranged for a 9am intake tomorrow 11/19/24 at [facility] [id number] in [place], [place].  Cab form faxed and placed on chart.    11/18/24, 1:10 PM EST    Patient goals/plan/ treatment preferences discussed by  and .  Patient goals/plan/ treatment preferences reviewed with patient/ family.  Patient/ family verbalize understanding of discharge plan and are in agreement with goal/plan/treatment preferences.  Understanding was demonstrated using the teach back method.  AVS provided by RN at time of discharge, which includes all necessary medical information pertaining to the patients current course of illness, treatment, post-discharge goals of care, and treatment preferences.     Services At/After Discharge: Community Resources    Patient is connected with [facility] and they did not have any open beds today and is scheduled for intake tomorrow at 9am.  Patient did not follow up with [facility] this am for a bed until  asked and provided Patient with phone to call.

## 2024-11-18 NOTE — PLAN OF CARE
Problem: Chronic Conditions and Co-morbidities  Goal: Patient's chronic conditions and co-morbidity symptoms are monitored and maintained or improved  Outcome: Progressing  Flowsheets  Taken 11/17/2024 2028 by Elmo Velásquez RN  Care Plan - Patient's Chronic Conditions and Co-Morbidity Symptoms are Monitored and Maintained or Improved:   Monitor and assess patient's chronic conditions and comorbid symptoms for stability, deterioration, or improvement   Collaborate with multidisciplinary team to address chronic and comorbid conditions and prevent exacerbation or deterioration   Update acute care plan with appropriate goals if chronic or comorbid symptoms are exacerbated and prevent overall improvement and discharge    Problem: Discharge Planning  Goal: Discharge to home or other facility with appropriate resources  Outcome: Progressing  Flowsheets  Taken 11/17/2024 2028 by Elmo Velásquez RN  Discharge to home or other facility with appropriate resources:   Identify barriers to discharge with patient and caregiver   Arrange for needed discharge resources and transportation as appropriate   Identify discharge learning needs (meds, wound care, etc)   Refer to discharge planning if patient needs post-hospital services based on physician order or complex needs related to functional status, cognitive ability or social support system  Note: Awaiting homeless shelter feedback by Monday.     Problem: Pain  Goal: Verbalizes/displays adequate comfort level or baseline comfort level  Outcome: Progressing  Flowsheets (Taken 11/17/2024 2029)  Verbalizes/displays adequate comfort level or baseline comfort level:   Encourage patient to monitor pain and request assistance   Assess pain using appropriate pain scale   Administer analgesics based on type and severity of pain and evaluate response   Implement non-pharmacological measures as appropriate and evaluate response   Consider cultural and social influences on pain and pain

## 2024-11-18 NOTE — PLAN OF CARE
Problem: Respiratory - Adult  Goal: Clear lung sounds  11/17/2024 1959 by Toyin Sharpe RCP  Outcome: Progressing

## 2024-11-19 VITALS
TEMPERATURE: 97.8 F | BODY MASS INDEX: 20.19 KG/M2 | SYSTOLIC BLOOD PRESSURE: 141 MMHG | OXYGEN SATURATION: 97 % | HEIGHT: 70 IN | WEIGHT: 141 LBS | DIASTOLIC BLOOD PRESSURE: 103 MMHG | RESPIRATION RATE: 20 BRPM | HEART RATE: 79 BPM

## 2024-11-19 PROCEDURE — G0378 HOSPITAL OBSERVATION PER HR: HCPCS

## 2024-11-19 PROCEDURE — 6370000000 HC RX 637 (ALT 250 FOR IP): Performed by: PHYSICIAN ASSISTANT

## 2024-11-19 PROCEDURE — 2580000003 HC RX 258: Performed by: PHYSICIAN ASSISTANT

## 2024-11-19 PROCEDURE — 94640 AIRWAY INHALATION TREATMENT: CPT

## 2024-11-19 PROCEDURE — 94761 N-INVAS EAR/PLS OXIMETRY MLT: CPT

## 2024-11-19 RX ADMIN — Medication 2000 UNITS: at 08:10

## 2024-11-19 RX ADMIN — ALBUTEROL SULFATE 2 PUFF: 90 AEROSOL, METERED RESPIRATORY (INHALATION) at 08:28

## 2024-11-19 RX ADMIN — TIOTROPIUM BROMIDE INHALATION SPRAY 2 PUFF: 3.12 SPRAY, METERED RESPIRATORY (INHALATION) at 08:28

## 2024-11-19 RX ADMIN — SODIUM CHLORIDE, PRESERVATIVE FREE 10 ML: 5 INJECTION INTRAVENOUS at 08:10

## 2024-11-19 RX ADMIN — Medication 1 TABLET: at 08:10

## 2024-11-19 RX ADMIN — BUDESONIDE AND FORMOTEROL FUMARATE DIHYDRATE 2 PUFF: 160; 4.5 AEROSOL RESPIRATORY (INHALATION) at 08:28

## 2024-11-19 RX ADMIN — Medication 100 MG: at 08:10

## 2024-11-19 RX ADMIN — FERROUS SULFATE TAB 325 MG (65 MG ELEMENTAL FE) 325 MG: 325 (65 FE) TAB at 08:10

## 2024-11-19 NOTE — RT PROTOCOL NOTE
RT Inhaler-Nebulizer Bronchodilator Protocol Note    There is a bronchodilator order in the chart from a provider indicating to follow the RT Bronchodilator Protocol and there is an “Initiate RT Inhaler-Nebulizer Bronchodilator Protocol” order as well (see protocol at bottom of note).    CXR Findings:  No results found.    The findings from the last RT Protocol Assessment were as follows:   History Pulmonary Disease: Chronic pulmonary disease  Respiratory Pattern: Dyspnea on exertion or RR 21-25 bpm  Breath Sounds: Slightly diminished and/or crackles  Cough: Strong, spontaneous, non-productive  Indication for Bronchodilator Therapy: Decreased or absent breath sounds  Bronchodilator Assessment Score: 6    Aerosolized bronchodilator medication orders have been revised according to the RT Inhaler-Nebulizer Bronchodilator Protocol below.    Respiratory Therapist to perform RT Therapy Protocol Assessment initially then follow the protocol.  Repeat RT Therapy Protocol Assessment PRN for score 0-3 or on second treatment, BID, and PRN for scores above 3.    No Indications - adjust the frequency to every 6 hours PRN wheezing or bronchospasm, if no treatments needed after 48 hours then discontinue using Per Protocol order mode.     If indication present, adjust the RT bronchodilator orders based on the Bronchodilator Assessment Score as indicated below.  Use Inhaler orders unless patient has one or more of the following: on home nebulizer, not able to hold breath for 10 seconds, is not alert and oriented, cannot activate and use MDI correctly, or respiratory rate 25 breaths per minute or more, then use the equivalent nebulizer order(s) with same Frequency and PRN reasons based on the score.  If a patient is on this medication at home then do not decrease Frequency below that used at home.    0-3 - enter or revise RT bronchodilator order(s) to equivalent RT Bronchodilator order with Frequency of every 4 hours PRN for wheezing 
RT Inhaler-Nebulizer Bronchodilator Protocol Note    There is a bronchodilator order in the chart from a provider indicating to follow the RT Bronchodilator Protocol and there is an “Initiate RT Inhaler-Nebulizer Bronchodilator Protocol” order as well (see protocol at bottom of note).    CXR Findings:  No results found.    The findings from the last RT Protocol Assessment were as follows:   History Pulmonary Disease: Chronic pulmonary disease  Respiratory Pattern: Dyspnea on exertion or RR 21-25 bpm  Breath Sounds: Slightly diminished and/or crackles  Cough: Strong, spontaneous, non-productive  Indication for Bronchodilator Therapy: Decreased or absent breath sounds, On home bronchodilators  Bronchodilator Assessment Score: 6    Aerosolized bronchodilator medication orders have been revised according to the RT Inhaler-Nebulizer Bronchodilator Protocol below.    Respiratory Therapist to perform RT Therapy Protocol Assessment initially then follow the protocol.  Repeat RT Therapy Protocol Assessment PRN for score 0-3 or on second treatment, BID, and PRN for scores above 3.    No Indications - adjust the frequency to every 6 hours PRN wheezing or bronchospasm, if no treatments needed after 48 hours then discontinue using Per Protocol order mode.     If indication present, adjust the RT bronchodilator orders based on the Bronchodilator Assessment Score as indicated below.  Use Inhaler orders unless patient has one or more of the following: on home nebulizer, not able to hold breath for 10 seconds, is not alert and oriented, cannot activate and use MDI correctly, or respiratory rate 25 breaths per minute or more, then use the equivalent nebulizer order(s) with same Frequency and PRN reasons based on the score.  If a patient is on this medication at home then do not decrease Frequency below that used at home.    0-3 - enter or revise RT bronchodilator order(s) to equivalent RT Bronchodilator order with Frequency of every 
RT Inhaler-Nebulizer Bronchodilator Protocol Note    There is a bronchodilator order in the chart from a provider indicating to follow the RT Bronchodilator Protocol and there is an “Initiate RT Inhaler-Nebulizer Bronchodilator Protocol” order as well (see protocol at bottom of note).    CXR Findings:  No results found.    The findings from the last RT Protocol Assessment were as follows:   History Pulmonary Disease: Chronic pulmonary disease  Respiratory Pattern: Regular pattern and RR 12-20 bpm  Breath Sounds: Slightly diminished and/or crackles  Cough: Strong, spontaneous, non-productive  Indication for Bronchodilator Therapy: Decreased or absent breath sounds  Bronchodilator Assessment Score: 4    Aerosolized bronchodilator medication orders have been revised according to the RT Inhaler-Nebulizer Bronchodilator Protocol below.    Respiratory Therapist to perform RT Therapy Protocol Assessment initially then follow the protocol.  Repeat RT Therapy Protocol Assessment PRN for score 0-3 or on second treatment, BID, and PRN for scores above 3.    No Indications - adjust the frequency to every 6 hours PRN wheezing or bronchospasm, if no treatments needed after 48 hours then discontinue using Per Protocol order mode.     If indication present, adjust the RT bronchodilator orders based on the Bronchodilator Assessment Score as indicated below.  Use Inhaler orders unless patient has one or more of the following: on home nebulizer, not able to hold breath for 10 seconds, is not alert and oriented, cannot activate and use MDI correctly, or respiratory rate 25 breaths per minute or more, then use the equivalent nebulizer order(s) with same Frequency and PRN reasons based on the score.  If a patient is on this medication at home then do not decrease Frequency below that used at home.    0-3 - enter or revise RT bronchodilator order(s) to equivalent RT Bronchodilator order with Frequency of every 4 hours PRN for wheezing or 
RT Inhaler-Nebulizer Bronchodilator Protocol Note    There is a bronchodilator order in the chart from a provider indicating to follow the RT Bronchodilator Protocol and there is an “Initiate RT Inhaler-Nebulizer Bronchodilator Protocol” order as well (see protocol at bottom of note).    CXR Findings:  No results found.    The findings from the last RT Protocol Assessment were as follows:   History Pulmonary Disease: Chronic pulmonary disease  Respiratory Pattern: Regular pattern and RR 12-20 bpm  Breath Sounds: Slightly diminished and/or crackles  Cough: Strong, spontaneous, non-productive  Indication for Bronchodilator Therapy: Decreased or absent breath sounds, On home bronchodilators  Bronchodilator Assessment Score: 4    Aerosolized bronchodilator medication orders have been revised according to the RT Inhaler-Nebulizer Bronchodilator Protocol below.    Respiratory Therapist to perform RT Therapy Protocol Assessment initially then follow the protocol.  Repeat RT Therapy Protocol Assessment PRN for score 0-3 or on second treatment, BID, and PRN for scores above 3.    No Indications - adjust the frequency to every 6 hours PRN wheezing or bronchospasm, if no treatments needed after 48 hours then discontinue using Per Protocol order mode.     If indication present, adjust the RT bronchodilator orders based on the Bronchodilator Assessment Score as indicated below.  Use Inhaler orders unless patient has one or more of the following: on home nebulizer, not able to hold breath for 10 seconds, is not alert and oriented, cannot activate and use MDI correctly, or respiratory rate 25 breaths per minute or more, then use the equivalent nebulizer order(s) with same Frequency and PRN reasons based on the score.  If a patient is on this medication at home then do not decrease Frequency below that used at home.    0-3 - enter or revise RT bronchodilator order(s) to equivalent RT Bronchodilator order with Frequency of every 4 
RT Inhaler-Nebulizer Bronchodilator Protocol Note    There is a bronchodilator order in the chart from a provider indicating to follow the RT Bronchodilator Protocol and there is an “Initiate RT Inhaler-Nebulizer Bronchodilator Protocol” order as well (see protocol at bottom of note).    CXR Findings:  No results found.    The findings from the last RT Protocol Assessment were as follows:   History Pulmonary Disease: Chronic pulmonary disease  Respiratory Pattern: Regular pattern and RR 12-20 bpm  Breath Sounds: Slightly diminished and/or crackles  Cough: Strong, spontaneous, non-productive  Indication for Bronchodilator Therapy: Decreased or absent breath sounds, On home bronchodilators  Bronchodilator Assessment Score: 4    Aerosolized bronchodilator medication orders have been revised according to the RT Inhaler-Nebulizer Bronchodilator Protocol below.    Respiratory Therapist to perform RT Therapy Protocol Assessment initially then follow the protocol.  Repeat RT Therapy Protocol Assessment PRN for score 0-3 or on second treatment, BID, and PRN for scores above 3.    No Indications - adjust the frequency to every 6 hours PRN wheezing or bronchospasm, if no treatments needed after 48 hours then discontinue using Per Protocol order mode.     If indication present, adjust the RT bronchodilator orders based on the Bronchodilator Assessment Score as indicated below.  Use Inhaler orders unless patient has one or more of the following: on home nebulizer, not able to hold breath for 10 seconds, is not alert and oriented, cannot activate and use MDI correctly, or respiratory rate 25 breaths per minute or more, then use the equivalent nebulizer order(s) with same Frequency and PRN reasons based on the score.  If a patient is on this medication at home then do not decrease Frequency below that used at home.    0-3 - enter or revise RT bronchodilator order(s) to equivalent RT Bronchodilator order with Frequency of every 4 
increased work of breathing using Per Protocol order mode.        4-6 - enter or revise RT Bronchodilator order(s) to two equivalent RT bronchodilator orders with one order with BID Frequency and one order with Frequency of every 4 hours PRN wheezing or increased work of breathing using Per Protocol order mode.        7-10 - enter or revise RT Bronchodilator order(s) to two equivalent RT bronchodilator orders with one order with TID Frequency and one order with Frequency of every 4 hours PRN wheezing or increased work of breathing using Per Protocol order mode.       11-13 - enter or revise RT Bronchodilator order(s) to one equivalent RT bronchodilator order with QID Frequency and an Albuterol order with Frequency of every 4 hours PRN wheezing or increased work of breathing using Per Protocol order mode.      Greater than 13 - enter or revise RT Bronchodilator order(s) to one equivalent RT bronchodilator order with every 4 hours Frequency and an Albuterol order with Frequency of every 2 hours PRN wheezing or increased work of breathing using Per Protocol order mode.     RT to enter RT Home Evaluation for COPD & MDI Assessment order using Per Protocol order mode.    Electronically signed by Toyin Sharpe RCP on 11/17/2024 at 7:58 PM

## 2024-11-19 NOTE — PLAN OF CARE
Problem: Chronic Conditions and Co-morbidities  Goal: Patient's chronic conditions and co-morbidity symptoms are monitored and maintained or improved  Outcome: Progressing  Flowsheets (Taken 11/19/2024 0216)  Care Plan - Patient's Chronic Conditions and Co-Morbidity Symptoms are Monitored and Maintained or Improved:   Monitor and assess patient's chronic conditions and comorbid symptoms for stability, deterioration, or improvement   Collaborate with multidisciplinary team to address chronic and comorbid conditions and prevent exacerbation or deterioration     Problem: Discharge Planning  Goal: Discharge to home or other facility with appropriate resources  Outcome: Progressing  Flowsheets (Taken 11/19/2024 0216)  Discharge to home or other facility with appropriate resources:   Identify barriers to discharge with patient and caregiver   Arrange for needed discharge resources and transportation as appropriate     Problem: Pain  Goal: Verbalizes/displays adequate comfort level or baseline comfort level  Outcome: Progressing  Flowsheets (Taken 11/19/2024 0216)  Verbalizes/displays adequate comfort level or baseline comfort level:   Encourage patient to monitor pain and request assistance   Assess pain using appropriate pain scale     Problem: Safety - Adult  Goal: Free from fall injury  Outcome: Progressing  Flowsheets (Taken 11/19/2024 0216)  Free From Fall Injury:   Instruct family/caregiver on patient safety   Based on caregiver fall risk screen, instruct family/caregiver to ask for assistance with transferring infant if caregiver noted to have fall risk factors

## 2024-11-19 NOTE — PROGRESS NOTES
AVS reviewed with patient. Patient given medications from outpatient pharmacy. Patient discharged to [facility]. Patient transported via [facility].

## 2024-11-19 NOTE — PLAN OF CARE
Problem: Respiratory - Adult  Goal: Clear lung sounds  11/18/2024 2121 by Ab Vance RCP  Outcome: Progressing  Note:  Patient lung sounds are considered normal for their current lung condition. No signs of distress noted. Current treatment regimen appropriate

## 2024-11-25 ENCOUNTER — HOSPITAL ENCOUNTER (EMERGENCY)
Age: 69
Discharge: HOME | End: 2024-11-25
Attending: EMERGENCY MEDICINE
Payer: MEDICARE

## 2024-11-25 ENCOUNTER — APPOINTMENT (OUTPATIENT)
Dept: GENERAL RADIOLOGY | Age: 69
End: 2024-11-25
Payer: MEDICARE

## 2024-11-25 VITALS
TEMPERATURE: 97.9 F | RESPIRATION RATE: 18 BRPM | HEART RATE: 72 BPM | SYSTOLIC BLOOD PRESSURE: 142 MMHG | OXYGEN SATURATION: 95 % | BODY MASS INDEX: 20.23 KG/M2 | DIASTOLIC BLOOD PRESSURE: 91 MMHG | WEIGHT: 141 LBS

## 2024-11-25 DIAGNOSIS — R06.02 SHORTNESS OF BREATH: Primary | ICD-10-CM

## 2024-11-25 LAB
AMPHETAMINES UR QL SCN: NEGATIVE
ANION GAP SERPL CALC-SCNC: 9 MEQ/L (ref 8–16)
BARBITURATES UR QL SCN: NEGATIVE
BASE EXCESS BLDA CALC-SCNC: 1.4 MMOL/L (ref -2–3)
BASOPHILS ABSOLUTE: 0 THOU/MM3 (ref 0–0.1)
BASOPHILS NFR BLD AUTO: 0.8 %
BENZODIAZ UR QL SCN: NEGATIVE
BILIRUB UR QL STRIP.AUTO: NEGATIVE
BUN SERPL-MCNC: 16 MG/DL (ref 7–22)
BZE UR QL SCN: NEGATIVE
CALCIUM SERPL-MCNC: 9 MG/DL (ref 8.5–10.5)
CANNABINOIDS UR QL SCN: NEGATIVE
CHARACTER UR: CLEAR
CHLORIDE SERPL-SCNC: 102 MEQ/L (ref 98–111)
CO2 SERPL-SCNC: 25 MEQ/L (ref 23–33)
COLLECTED BY:: NORMAL
COLOR, UA: YELLOW
CREAT SERPL-MCNC: 0.9 MG/DL (ref 0.4–1.2)
D DIMER PPP IA.FEU-MCNC: 222 NG/ML FEU (ref 0–500)
DEPRECATED RDW RBC AUTO: 49.9 FL (ref 35–45)
DEVICE: NORMAL
EKG ATRIAL RATE: 77 BPM
EKG P AXIS: 79 DEGREES
EKG P-R INTERVAL: 158 MS
EKG Q-T INTERVAL: 382 MS
EKG QRS DURATION: 88 MS
EKG QTC CALCULATION (BAZETT): 432 MS
EKG R AXIS: 76 DEGREES
EKG T AXIS: 48 DEGREES
EKG VENTRICULAR RATE: 77 BPM
EOSINOPHIL NFR BLD AUTO: 2.5 %
EOSINOPHILS ABSOLUTE: 0.1 THOU/MM3 (ref 0–0.4)
ERYTHROCYTE [DISTWIDTH] IN BLOOD BY AUTOMATED COUNT: 12.8 % (ref 11.5–14.5)
ETHANOL SERPL-MCNC: < 0.01 % (ref 0–0.08)
FENTANYL: NEGATIVE
GFR SERPL CREATININE-BSD FRML MDRD: > 90 ML/MIN/1.73M2
GLUCOSE SERPL-MCNC: 82 MG/DL (ref 70–108)
GLUCOSE UR QL STRIP.AUTO: NEGATIVE MG/DL
HCO3 BLDA-SCNC: 26 MMOL/L (ref 23–28)
HCT VFR BLD AUTO: 39.6 % (ref 42–52)
HGB BLD-MCNC: 13 GM/DL (ref 14–18)
HGB UR QL STRIP.AUTO: NEGATIVE
IMM GRANULOCYTES # BLD AUTO: 0.02 THOU/MM3 (ref 0–0.07)
IMM GRANULOCYTES NFR BLD AUTO: 0.6 %
KETONES UR QL STRIP.AUTO: NEGATIVE
LIPASE SERPL-CCNC: 21.3 U/L (ref 5.6–51.3)
LYMPHOCYTES ABSOLUTE: 1.3 THOU/MM3 (ref 1–4.8)
LYMPHOCYTES NFR BLD AUTO: 36.3 %
MAGNESIUM SERPL-MCNC: 1.9 MG/DL (ref 1.6–2.4)
MCH RBC QN AUTO: 34.5 PG (ref 26–33)
MCHC RBC AUTO-ENTMCNC: 32.8 GM/DL (ref 32.2–35.5)
MCV RBC AUTO: 105 FL (ref 80–94)
MONOCYTES ABSOLUTE: 0.6 THOU/MM3 (ref 0.4–1.3)
MONOCYTES NFR BLD AUTO: 15.5 %
NEUTROPHILS ABSOLUTE: 1.6 THOU/MM3 (ref 1.8–7.7)
NEUTROPHILS NFR BLD AUTO: 44.3 %
NITRITE UR QL STRIP: NEGATIVE
NRBC BLD AUTO-RTO: 0 /100 WBC
NT-PROBNP SERPL IA-MCNC: 239.5 PG/ML (ref 0–124)
OPIATES UR QL SCN: NEGATIVE
OSMOLALITY SERPL CALC.SUM OF ELEC: 272.2 MOSMOL/KG (ref 275–300)
OXYCODONE: NEGATIVE
PCO2 TEMP ADJ BLDMV: 42 MMHG (ref 41–51)
PCP UR QL SCN: NEGATIVE
PH BLDMV: 7.41 [PH] (ref 7.31–7.41)
PH UR STRIP.AUTO: 6.5 [PH] (ref 5–9)
PLATELET # BLD AUTO: 185 THOU/MM3 (ref 130–400)
PMV BLD AUTO: 9.6 FL (ref 9.4–12.4)
PO2 BLDMV: 36 MMHG (ref 25–40)
POTASSIUM SERPL-SCNC: 3.5 MEQ/L (ref 3.5–5.2)
PROT UR STRIP.AUTO-MCNC: NEGATIVE MG/DL
RBC # BLD AUTO: 3.77 MILL/MM3 (ref 4.7–6.1)
SAO2 % BLDMV: 70 %
SITE: NORMAL
SODIUM SERPL-SCNC: 136 MEQ/L (ref 135–145)
SP GR UR REFRACT.AUTO: 1.02 (ref 1–1.03)
TROPONIN, HIGH SENSITIVITY: 12 NG/L (ref 0–12)
UROBILINOGEN, URINE: 1 EU/DL (ref 0–1)
VENTILATION MODE VENT: NORMAL
WBC # BLD AUTO: 3.6 THOU/MM3 (ref 4.8–10.8)
WBC #/AREA URNS HPF: NEGATIVE /[HPF]

## 2024-11-25 PROCEDURE — 94640 AIRWAY INHALATION TREATMENT: CPT

## 2024-11-25 PROCEDURE — 82803 BLOOD GASES ANY COMBINATION: CPT

## 2024-11-25 PROCEDURE — 2580000003 HC RX 258

## 2024-11-25 PROCEDURE — 83735 ASSAY OF MAGNESIUM: CPT

## 2024-11-25 PROCEDURE — 83690 ASSAY OF LIPASE: CPT

## 2024-11-25 PROCEDURE — 81003 URINALYSIS AUTO W/O SCOPE: CPT

## 2024-11-25 PROCEDURE — 84484 ASSAY OF TROPONIN QUANT: CPT

## 2024-11-25 PROCEDURE — 96374 THER/PROPH/DIAG INJ IV PUSH: CPT

## 2024-11-25 PROCEDURE — 36415 COLL VENOUS BLD VENIPUNCTURE: CPT

## 2024-11-25 PROCEDURE — 80048 BASIC METABOLIC PNL TOTAL CA: CPT

## 2024-11-25 PROCEDURE — 71046 X-RAY EXAM CHEST 2 VIEWS: CPT

## 2024-11-25 PROCEDURE — 93010 ELECTROCARDIOGRAM REPORT: CPT | Performed by: INTERNAL MEDICINE

## 2024-11-25 PROCEDURE — 83880 ASSAY OF NATRIURETIC PEPTIDE: CPT

## 2024-11-25 PROCEDURE — 85025 COMPLETE CBC W/AUTO DIFF WBC: CPT

## 2024-11-25 PROCEDURE — 6370000000 HC RX 637 (ALT 250 FOR IP)

## 2024-11-25 PROCEDURE — 6360000002 HC RX W HCPCS

## 2024-11-25 PROCEDURE — 99285 EMERGENCY DEPT VISIT HI MDM: CPT

## 2024-11-25 PROCEDURE — 82077 ASSAY SPEC XCP UR&BREATH IA: CPT

## 2024-11-25 PROCEDURE — 93005 ELECTROCARDIOGRAM TRACING: CPT | Performed by: EMERGENCY MEDICINE

## 2024-11-25 PROCEDURE — 80307 DRUG TEST PRSMV CHEM ANLYZR: CPT

## 2024-11-25 PROCEDURE — 85379 FIBRIN DEGRADATION QUANT: CPT

## 2024-11-25 RX ORDER — IPRATROPIUM BROMIDE AND ALBUTEROL SULFATE 2.5; .5 MG/3ML; MG/3ML
2 SOLUTION RESPIRATORY (INHALATION) ONCE
Status: COMPLETED | OUTPATIENT
Start: 2024-11-25 | End: 2024-11-25

## 2024-11-25 RX ADMIN — METHYLPREDNISOLONE SODIUM SUCCINATE 125 MG: 125 INJECTION INTRAMUSCULAR; INTRAVENOUS at 11:01

## 2024-11-25 RX ADMIN — IPRATROPIUM BROMIDE AND ALBUTEROL SULFATE 2 DOSE: .5; 3 SOLUTION RESPIRATORY (INHALATION) at 09:51

## 2024-11-25 ASSESSMENT — PAIN SCALES - GENERAL: PAINLEVEL_OUTOF10: 9

## 2024-11-25 ASSESSMENT — PAIN DESCRIPTION - LOCATION: LOCATION: CHEST

## 2024-11-25 ASSESSMENT — PAIN DESCRIPTION - PAIN TYPE: TYPE: ACUTE PAIN

## 2024-11-25 NOTE — ED PROVIDER NOTES
[facility] EMERGENCY DEPT  EMERGENCY DEPARTMENT ENCOUNTER          Pt Name: Ronn Ramires  MRN: [id number]  Birthdate 1955  Date of evaluation: 11/25/2024  Physician: Ilsa Kovacs MD  Supervising Attending Physician: Terell Casper DO       CHIEF COMPLAINT       Chief Complaint   Patient presents with    Shortness of Breath    Chest Pain    Lightheadedness         HISTORY OF PRESENT ILLNESS    HPI  Ronn Ramires is a 68 y.o. male who presents to the emergency department  from rehab , brought in by EMS for evaluation of SOB    Patient presents from rehab complaining of shortness of breath and lightheadedness that began after he ate a sweet room.  But reports that shortness of breath has been present for 2 days associated with chest pain at the top of his chest.  His symptoms are not exertional.  Does not report any orthopnea.  He denies any fever chills or cough or sputum production.  The patient has no other acute complaints at this time.      REVIEW OF SYSTEMS   Review of Systems      PAST MEDICAL AND SURGICAL HISTORY     Past Medical History:   Diagnosis Date    Asthma     Cerebral artery occlusion with cerebral infarction (HCC)     Chronic kidney disease     Community acquired pneumonia 1/7/2024    Diarrhea     Hemodialysis patient (HCC)     previous    Hypertension     Liver disease     Psychiatric problem      Past Surgical History:   Procedure Laterality Date    COLONOSCOPY           MEDICATIONS   No current facility-administered medications for this encounter.    Current Outpatient Medications:     ipratropium (ATROVENT HFA) 17 MCG/ACT inhaler, Inhale 1 puff into the lungs 2 times daily, Disp: 12.9 each, Rfl: 0    albuterol sulfate HFA (PROVENTIL;VENTOLIN;PROAIR) 108 (90 Base) MCG/ACT inhaler, Inhale 2 puffs into the lungs every 4 hours as needed for Shortness of Breath or Wheezing, Disp: 18 g, Rfl: 0    fluticasone furoate-vilanterol (BREO ELLIPTA) 100-25 MCG/ACT inhaler,

## 2024-11-25 NOTE — CARE COORDINATION
Spoke with Pema on the house team at [facility] for patient. Pema advised patient became short of breath, could not ambulate or shower this am, so she called squad. Per Pema if patient is able to complete own ADLs patient able to return to [facility].  This information was presented to Dr. Casper and Dr. Kovacs.

## 2024-11-25 NOTE — ED NOTES
Pt presents to the ED via [facility] from [facility] for concerns of SOB, chest pain and lightheadedness after taking a bit from a sweet roll. Patient reports his chest pain is intermittent at a 7-9/10. EKG completed. EMS report blood sugar is 90. VSS. Respirations easy and unlabored.

## 2024-11-25 NOTE — DISCHARGE INSTRUCTIONS
You are seen today for shortness of breath and lightheadedness.  All of your labs chest x-ray and investigations did not reveal any acute findings.    You were treated with a breathing treatment and some steroids.    Continue taking your home inhalers as prescribed    Follow-up with your family doctor for further assessment and management of your symptoms.    Return to the emergency department immediately if there is any new or concerning symptom.

## 2025-05-07 NOTE — ED TRIAGE NOTES
Pt to ED via [facility] c/o SOB and chest pain. Pt states he started getting SOB today. Pt states he ran out of his medications: albuterol and amlodipine. Pt does not have PCP established for prescription refill. IV access established PTA. Albuterol tx given x1 by EMS. Rates pain 10/10.   IVF, FS, K+/done